# Patient Record
Sex: FEMALE | Race: WHITE | NOT HISPANIC OR LATINO | Employment: FULL TIME | URBAN - METROPOLITAN AREA
[De-identification: names, ages, dates, MRNs, and addresses within clinical notes are randomized per-mention and may not be internally consistent; named-entity substitution may affect disease eponyms.]

---

## 2017-06-21 ENCOUNTER — TELEPHONE (OUTPATIENT)
Dept: RHEUMATOLOGY | Facility: CLINIC | Age: 52
End: 2017-06-21

## 2017-06-21 NOTE — TELEPHONE ENCOUNTER
Called and informed pt that December is soonest appointment available. Informed that was placed on waiting list and should received notification via [facility] if someone cancels their appointment. Pt verbalized understanding

## 2017-06-21 NOTE — TELEPHONE ENCOUNTER
----- Message from Manjula Peres sent at 6/21/2017  2:10 PM CDT -----  Contact: Patient  Patient called and scheduled for Dec but she stated she really needs to get in sooner; she is having a lot of joint pain and swelling in her hands. Please call her at [phone].    Thanks,  Manjula

## 2017-08-02 ENCOUNTER — OFFICE VISIT (OUTPATIENT)
Dept: RHEUMATOLOGY | Facility: CLINIC | Age: 52
End: 2017-08-02
Payer: COMMERCIAL

## 2017-08-02 VITALS — WEIGHT: 171.06 LBS | BODY MASS INDEX: 29.37 KG/M2

## 2017-08-02 DIAGNOSIS — D83.9 CVID (COMMON VARIABLE IMMUNODEFICIENCY): ICD-10-CM

## 2017-08-02 DIAGNOSIS — M47.25 OSTEOARTHRITIS OF SPINE WITH RADICULOPATHY, THORACOLUMBAR REGION: ICD-10-CM

## 2017-08-02 DIAGNOSIS — D83.9 IMMUNODEFICIENCY, COMMON VARIABLE: ICD-10-CM

## 2017-08-02 DIAGNOSIS — M02.30 REACTIVE ARTHRITIS: ICD-10-CM

## 2017-08-02 DIAGNOSIS — M35.00 SJOGREN'S SYNDROME, WITH UNSPECIFIED ORGAN INVOLVEMENT: ICD-10-CM

## 2017-08-02 DIAGNOSIS — G47.26 SHIFT WORK SLEEP DISORDER: ICD-10-CM

## 2017-08-02 DIAGNOSIS — R63.5 WEIGHT GAIN: ICD-10-CM

## 2017-08-02 DIAGNOSIS — K21.9 GASTROESOPHAGEAL REFLUX DISEASE, ESOPHAGITIS PRESENCE NOT SPECIFIED: Primary | ICD-10-CM

## 2017-08-02 PROCEDURE — 96372 THER/PROPH/DIAG INJ SC/IM: CPT | Mod: S$GLB,,, | Performed by: INTERNAL MEDICINE

## 2017-08-02 PROCEDURE — 99214 OFFICE O/P EST MOD 30 MIN: CPT | Mod: 25,S$GLB,, | Performed by: INTERNAL MEDICINE

## 2017-08-02 PROCEDURE — 99999 PR PBB SHADOW E&M-EST. PATIENT-LVL II: CPT | Mod: PBBFAC,,, | Performed by: INTERNAL MEDICINE

## 2017-08-02 PROCEDURE — 3008F BODY MASS INDEX DOCD: CPT | Mod: S$GLB,,, | Performed by: INTERNAL MEDICINE

## 2017-08-02 RX ORDER — MECLIZINE HYDROCHLORIDE 25 MG/1
25 TABLET ORAL 3 TIMES DAILY PRN
Qty: 45 TABLET | Refills: 2 | Status: SHIPPED | OUTPATIENT
Start: 2017-08-02 | End: 2021-02-04

## 2017-08-02 RX ORDER — ESTRADIOL 2 MG/1
4 TABLET ORAL DAILY
Qty: 60 TABLET | Refills: 3 | Status: SHIPPED | OUTPATIENT
Start: 2017-08-02 | End: 2018-09-05 | Stop reason: SDUPTHER

## 2017-08-02 RX ORDER — ONDANSETRON 4 MG/1
4 TABLET, ORALLY DISINTEGRATING ORAL EVERY 6 HOURS PRN
Qty: 20 TABLET | Refills: 3 | Status: SHIPPED | OUTPATIENT
Start: 2017-08-02

## 2017-08-02 RX ORDER — PROMETHAZINE HYDROCHLORIDE 25 MG/1
25 TABLET ORAL EVERY 4 HOURS
Qty: 45 TABLET | Refills: 2 | Status: SHIPPED | OUTPATIENT
Start: 2017-08-02

## 2017-08-02 RX ORDER — SUCRALFATE 1 G/1
1 TABLET ORAL 4 TIMES DAILY
Qty: 120 TABLET | Refills: 5 | Status: SHIPPED | OUTPATIENT
Start: 2017-08-02 | End: 2017-12-15 | Stop reason: SDUPTHER

## 2017-08-02 RX ORDER — HYDROXYCHLOROQUINE SULFATE 200 MG/1
200 TABLET, FILM COATED ORAL 2 TIMES DAILY
Qty: 60 TABLET | Refills: 11 | Status: SHIPPED | OUTPATIENT
Start: 2017-08-02 | End: 2018-09-05 | Stop reason: SDUPTHER

## 2017-08-02 RX ORDER — KETOROLAC TROMETHAMINE 30 MG/ML
60 INJECTION, SOLUTION INTRAMUSCULAR; INTRAVENOUS
Status: COMPLETED | OUTPATIENT
Start: 2017-08-02 | End: 2017-08-02

## 2017-08-02 RX ORDER — ALMOTRIPTAN 12.5 MG/1
TABLET, FILM COATED ORAL
Qty: 12 TABLET | Refills: 3 | Status: SHIPPED | OUTPATIENT
Start: 2017-08-02 | End: 2017-12-15 | Stop reason: SDUPTHER

## 2017-08-02 RX ORDER — MODAFINIL 200 MG/1
TABLET ORAL
Qty: 30 TABLET | Refills: 4 | Status: SHIPPED | OUTPATIENT
Start: 2017-08-02 | End: 2017-12-15 | Stop reason: SDUPTHER

## 2017-08-02 RX ORDER — METHYLPREDNISOLONE 4 MG/1
8 TABLET ORAL DAILY
Qty: 60 TABLET | Refills: 3 | Status: SHIPPED | OUTPATIENT
Start: 2017-08-02 | End: 2017-09-01

## 2017-08-02 RX ORDER — LEVOCETIRIZINE DIHYDROCHLORIDE 5 MG/1
5 TABLET, FILM COATED ORAL NIGHTLY
Qty: 30 TABLET | Refills: 5 | Status: SHIPPED | OUTPATIENT
Start: 2017-08-02

## 2017-08-02 RX ORDER — SUMATRIPTAN SUCCINATE 100 MG/1
100 TABLET ORAL ONCE
Qty: 9 TABLET | Refills: 3 | Status: SHIPPED | OUTPATIENT
Start: 2017-08-02 | End: 2017-12-15

## 2017-08-02 RX ORDER — METHYLPREDNISOLONE ACETATE 80 MG/ML
160 INJECTION, SUSPENSION INTRA-ARTICULAR; INTRALESIONAL; INTRAMUSCULAR; SOFT TISSUE
Status: COMPLETED | OUTPATIENT
Start: 2017-08-02 | End: 2017-08-02

## 2017-08-02 RX ORDER — PANTOPRAZOLE SODIUM 40 MG/1
40 TABLET, DELAYED RELEASE ORAL DAILY
Qty: 30 TABLET | Refills: 11 | Status: SHIPPED | OUTPATIENT
Start: 2017-08-02 | End: 2017-12-15 | Stop reason: SDUPTHER

## 2017-08-02 RX ORDER — DICLOFENAC SODIUM 10 MG/G
2 GEL TOPICAL 3 TIMES DAILY
Qty: 100 G | Refills: 5 | Status: SHIPPED | OUTPATIENT
Start: 2017-08-02 | End: 2017-12-15 | Stop reason: SDUPTHER

## 2017-08-02 RX ADMIN — KETOROLAC TROMETHAMINE 60 MG: 30 INJECTION, SOLUTION INTRAMUSCULAR; INTRAVENOUS at 03:08

## 2017-08-02 RX ADMIN — METHYLPREDNISOLONE ACETATE 160 MG: 80 INJECTION, SUSPENSION INTRA-ARTICULAR; INTRALESIONAL; INTRAMUSCULAR; SOFT TISSUE at 03:08

## 2017-08-02 ASSESSMENT — ROUTINE ASSESSMENT OF PATIENT INDEX DATA (RAPID3)
PAIN SCORE: 7
AM STIFFNESS SCORE: 1, YES
PATIENT GLOBAL ASSESSMENT SCORE: 3
WHEN YOU AWAKENED IN THE MORNING OVER THE LAST WEEK, PLEASE INDICATE THE AMOUNT OF TIME IT TAKES UNTIL YOU ARE AS LIMBER AS YOU WILL BE FOR THE DAY: ONE HOUR AFTER WAKING
FATIGUE SCORE: 3
TOTAL RAPID3 SCORE: 5
MDHAQ FUNCTION SCORE: 1.5
PSYCHOLOGICAL DISTRESS SCORE: 3.3

## 2017-08-02 NOTE — PROGRESS NOTES
Administered 2 cc ( 80 mg/ml ) of depomedrol to the right upper outer gluteal. Informed of s/s to report verbalized understanding. No adverse reactions noted.    Lot # [id number]  Expiration 06/2018    Administered 2 cc ( 30 mg/ml ) of toradol to the left upper outer gluteal. Informed of s/s to report verbalized understanding. No adverse reactions noted.    Lot # [id number]  Expiration 1 may 2019

## 2017-08-02 NOTE — PROGRESS NOTES
Subjective:       Patient ID: Crystal Hein is a 51 y.o. female.    Chief Complaint: Follow-up    F/u: she has  ra and sjogren's  And off meds. It sinus infection multiple, dizziness vertigo,Patient complains of arthralgias and myalgias for which has been present for a few years. Pain is located in multiple joints, both shoulder(s), both elbow(s), both wrist(s), both MCP(s): 1st, 2nd, 3rd, 4th and 5th, both PIP(s): 1st, 2nd, 3rd, 4th and 5th, both DIP(s): 1st and 2nd, both hip(s), both knee(s) and both MTP(s): 1st, 2nd, 3rd, 4th and 5th, is described as aching, pulsating, shooting and throbbing, and is constant, moderate .  Associated symptoms include: crepitation, decreased range of motion, edema, effusion, tenderness and warmth.        Review of Systems   Constitutional: Positive for chills. Negative for activity change, appetite change, diaphoresis and unexpected weight change.   HENT: Negative for congestion, dental problem, ear discharge, ear pain, facial swelling, mouth sores, nosebleeds, postnasal drip, rhinorrhea, sinus pressure, sneezing, sore throat, tinnitus and voice change.    Eyes: Negative for photophobia, pain, discharge, redness and itching.   Respiratory: Negative for apnea, cough, chest tightness, shortness of breath and wheezing.    Cardiovascular: Positive for leg swelling. Negative for chest pain and palpitations.   Gastrointestinal: Positive for abdominal pain. Negative for abdominal distention, constipation, diarrhea, nausea and vomiting.   Endocrine: Negative for cold intolerance, heat intolerance, polydipsia and polyuria.   Genitourinary: Negative for decreased urine volume, difficulty urinating, flank pain, frequency, hematuria and urgency.   Musculoskeletal: Positive for arthralgias, back pain, gait problem, neck pain and neck stiffness.   Skin: Negative for pallor, rash and wound.   Allergic/Immunologic: Negative for immunocompromised state.   Neurological: Positive for weakness.  Negative for dizziness, tremors and numbness.   Hematological: Negative for adenopathy. Does not bruise/bleed easily.   Psychiatric/Behavioral: Negative for sleep disturbance. The patient is not nervous/anxious.          Objective:     Wt 77.6 kg (171 lb 1.2 oz)   BMI 29.37 kg/m²      Physical Exam   Vitals reviewed.  Constitutional: She is oriented to person, place, and time. She appears distressed.   HENT:   Head: Normocephalic and atraumatic.   Eyes: EOM are normal. Pupils are equal, round, and reactive to light. Right eye exhibits no discharge. Left eye exhibits no discharge.   Neck: Neck supple. No thyromegaly present.   Cardiovascular: Normal rate, regular rhythm and normal heart sounds.  Exam reveals no gallop and no friction rub.    No murmur heard.  Pulmonary/Chest: Breath sounds normal. She has no wheezes. She has no rales. She exhibits no tenderness.   Abdominal: There is no tenderness. There is no rebound and no guarding.       Right Side Rheumatological Exam     Examination finds the elbow normal.    The patient is tender to palpation of the shoulder, wrist, knee, 1st PIP, 1st MCP, 2nd PIP, 2nd MCP, 3rd PIP, 3rd MCP, 4th PIP, 4th MCP, 5th PIP and 5th MCP    She has swelling of the 1st PIP, 1st MCP, 2nd PIP, 2nd MCP, 3rd PIP, 3rd MCP, 4th PIP, 4th MCP, 5th PIP and 5th MCP    Shoulder Exam   Tenderness Location: no tenderness    Range of Motion   Active Abduction: abnormal   Adduction: abnormal  Sensation: normal    Knee Exam   Patellofemoral Crepitus: positive  Effusion: positive  Sensation: normal    Hip Exam   Tenderness Location: posterior  Sensation: normal    Elbow/Wrist Exam   Tenderness Location: no tenderness  Sensation: normal    Left Side Rheumatological Exam     The patient is tender to palpation of the shoulder, elbow, wrist, knee, 1st PIP, 1st MCP, 2nd PIP, 2nd MCP, 3rd PIP, 3rd MCP, 4th PIP, 4th MCP, 5th PIP and 5th MCP.    She has swelling of the 1st PIP, 1st MCP, 2nd PIP, 2nd MCP, 3rd  PIP, 3rd MCP, 4th PIP, 4th MCP, 5th PIP and 5th MCP    Shoulder Exam   Tenderness Location: no tenderness    Range of Motion   Active Abduction: abnormal   Sensation: normal    Knee Exam     Patellofemoral Crepitus: positive  Effusion: positive  Sensation: normal    Hip Exam   Tenderness Location: posterior  Sensation: normal    Elbow/Wrist Exam   Sensation: normal      Back/Neck Exam   General Inspection   Gait: normal         Lymphadenopathy:     She has no cervical adenopathy.   Neurological: She is alert and oriented to person, place, and time. Gait normal.   Skin: Skin is dry. No rash noted. No erythema. There is pallor.     Psychiatric: Mood and affect normal.   Musculoskeletal: She exhibits tenderness and deformity. She exhibits no edema.                 Assessment:          Encounter Diagnoses   Name Primary?    Osteoarthritis of spine with radiculopathy, thoracolumbar region     Reactive arthritis     Sjogren's syndrome, with unspecified organ involvement     Chest pain radiating to arm     Immunodeficiency, common variable     Cervical (neck) region somatic dysfunction     Shift work sleep disorder     CVID (common variable immunodeficiency)     Vertigo     Weight gain     Other sinusitis, unspecified chronicity          Plan:     [place] was seen today for follow-up.    Diagnoses and all orders for this visit:    Gastroesophageal reflux disease, esophagitis presence not specified  -     CBC auto differential; Future  -     Comprehensive metabolic panel; Future  -     C-reactive protein; Future  -     Sjogrens syndrome-A extractable nuclear antibody; Future  -     Sjogrens syndrome-B extractable nuclear antibody; Future  -     Sedimentation rate, manual; Future  -     Anti-Histone Antibody; Future  -     Anti-scleroderma antibody; Future  -     TSH; Future  -     T4, free; Future  -     T3, free; Future  -     sucralfate (CARAFATE) 1 gram tablet; Take 1 tablet (1 g total) by mouth 4 (four) times  daily.  -     pantoprazole (PROTONIX) 40 MG tablet; Take 1 tablet (40 mg total) by mouth once daily.  -     CBC auto differential  -     Comprehensive metabolic panel  -     C-reactive protein  -     Sjogrens syndrome-A extractable nuclear antibody  -     Sjogrens syndrome-B extractable nuclear antibody  -     Sedimentation rate, manual  -     Anti-Histone Antibody  -     Anti-scleroderma antibody  -     TSH  -     T3, free    Osteoarthritis of spine with radiculopathy, thoracolumbar region  -     diclofenac sodium (VOLTAREN) 1 % Gel; Apply 2 g topically 3 (three) times daily.  -     estradiol (ESTRACE) 2 MG tablet; Take 2 tablets (4 mg total) by mouth once daily.  -     modafinil (PROVIGIL) 200 MG Tab; 1 tab po qam  -     ketorolac injection 60 mg; Inject 2 mLs (60 mg total) into the muscle one time.  -     methylPREDNISolone acetate injection 160 mg; Inject 2 mLs (160 mg total) into the muscle one time.    Reactive arthritis  -     almotriptan (AXERT) 12.5 MG tablet; TAKE ONE TABLET ONSET OF MIGRAINE AND REPEAT IN TWO HOURS IF NEEDED MAX TWO TABS/DAY  -     diclofenac sodium (VOLTAREN) 1 % Gel; Apply 2 g topically 3 (three) times daily.  -     estradiol (ESTRACE) 2 MG tablet; Take 2 tablets (4 mg total) by mouth once daily.  -     hydroxychloroquine (PLAQUENIL) 200 mg tablet; Take 1 tablet (200 mg total) by mouth 2 (two) times daily.  -     levocetirizine (XYZAL) 5 MG tablet; Take 1 tablet (5 mg total) by mouth every evening.  -     meclizine (ANTIVERT) 25 mg tablet; Take 1 tablet (25 mg total) by mouth 3 (three) times daily as needed.  -     modafinil (PROVIGIL) 200 MG Tab; 1 tab po qam  -     ondansetron (ZOFRAN-ODT) 4 MG TbDL; Take 1 tablet (4 mg total) by mouth every 6 (six) hours as needed.  -     promethazine (PHENERGAN) 25 MG tablet; Take 1 tablet (25 mg total) by mouth every 4 (four) hours.  -     sumatriptan (IMITREX) 100 MG tablet; Take 1 tablet (100 mg total) by mouth once.  -     methylPREDNISolone  (MEDROL) 4 MG Tab; Take 2 tablets (8 mg total) by mouth once daily.  -     ketorolac injection 60 mg; Inject 2 mLs (60 mg total) into the muscle one time.  -     methylPREDNISolone acetate injection 160 mg; Inject 2 mLs (160 mg total) into the muscle one time.    Sjogren's syndrome, with unspecified organ involvement  -     almotriptan (AXERT) 12.5 MG tablet; TAKE ONE TABLET ONSET OF MIGRAINE AND REPEAT IN TWO HOURS IF NEEDED MAX TWO TABS/DAY  -     diclofenac sodium (VOLTAREN) 1 % Gel; Apply 2 g topically 3 (three) times daily.  -     estradiol (ESTRACE) 2 MG tablet; Take 2 tablets (4 mg total) by mouth once daily.  -     hydroxychloroquine (PLAQUENIL) 200 mg tablet; Take 1 tablet (200 mg total) by mouth 2 (two) times daily.  -     levocetirizine (XYZAL) 5 MG tablet; Take 1 tablet (5 mg total) by mouth every evening.  -     meclizine (ANTIVERT) 25 mg tablet; Take 1 tablet (25 mg total) by mouth 3 (three) times daily as needed.  -     modafinil (PROVIGIL) 200 MG Tab; 1 tab po qam  -     ondansetron (ZOFRAN-ODT) 4 MG TbDL; Take 1 tablet (4 mg total) by mouth every 6 (six) hours as needed.  -     promethazine (PHENERGAN) 25 MG tablet; Take 1 tablet (25 mg total) by mouth every 4 (four) hours.  -     sumatriptan (IMITREX) 100 MG tablet; Take 1 tablet (100 mg total) by mouth once.  -     methylPREDNISolone (MEDROL) 4 MG Tab; Take 2 tablets (8 mg total) by mouth once daily.  -     ketorolac injection 60 mg; Inject 2 mLs (60 mg total) into the muscle one time.  -     methylPREDNISolone acetate injection 160 mg; Inject 2 mLs (160 mg total) into the muscle one time.    Immunodeficiency, common variable  -     diclofenac sodium (VOLTAREN) 1 % Gel; Apply 2 g topically 3 (three) times daily.    Shift work sleep disorder  -     estradiol (ESTRACE) 2 MG tablet; Take 2 tablets (4 mg total) by mouth once daily.  -     modafinil (PROVIGIL) 200 MG Tab; 1 tab po qam    CVID (common variable immunodeficiency)  -     almotriptan  (AXERT) 12.5 MG tablet; TAKE ONE TABLET ONSET OF MIGRAINE AND REPEAT IN TWO HOURS IF NEEDED MAX TWO TABS/DAY  -     diclofenac sodium (VOLTAREN) 1 % Gel; Apply 2 g topically 3 (three) times daily.  -     estradiol (ESTRACE) 2 MG tablet; Take 2 tablets (4 mg total) by mouth once daily.  -     hydroxychloroquine (PLAQUENIL) 200 mg tablet; Take 1 tablet (200 mg total) by mouth 2 (two) times daily.  -     levocetirizine (XYZAL) 5 MG tablet; Take 1 tablet (5 mg total) by mouth every evening.  -     meclizine (ANTIVERT) 25 mg tablet; Take 1 tablet (25 mg total) by mouth 3 (three) times daily as needed.  -     modafinil (PROVIGIL) 200 MG Tab; 1 tab po qam  -     ondansetron (ZOFRAN-ODT) 4 MG TbDL; Take 1 tablet (4 mg total) by mouth every 6 (six) hours as needed.  -     promethazine (PHENERGAN) 25 MG tablet; Take 1 tablet (25 mg total) by mouth every 4 (four) hours.  -     sumatriptan (IMITREX) 100 MG tablet; Take 1 tablet (100 mg total) by mouth once.  -     methylPREDNISolone (MEDROL) 4 MG Tab; Take 2 tablets (8 mg total) by mouth once daily.  -     ketorolac injection 60 mg; Inject 2 mLs (60 mg total) into the muscle one time.  -     methylPREDNISolone acetate injection 160 mg; Inject 2 mLs (160 mg total) into the muscle one time.    Weight gain  -     promethazine (PHENERGAN) 25 MG tablet; Take 1 tablet (25 mg total) by mouth every 4 (four) hours.  -     sumatriptan (IMITREX) 100 MG tablet; Take 1 tablet (100 mg total) by mouth once.

## 2017-12-14 ENCOUNTER — PATIENT MESSAGE (OUTPATIENT)
Dept: RHEUMATOLOGY | Facility: CLINIC | Age: 52
End: 2017-12-14

## 2017-12-15 ENCOUNTER — OFFICE VISIT (OUTPATIENT)
Dept: RHEUMATOLOGY | Facility: CLINIC | Age: 52
End: 2017-12-15
Payer: COMMERCIAL

## 2017-12-15 VITALS
BODY MASS INDEX: 29.33 KG/M2 | WEIGHT: 170.88 LBS | DIASTOLIC BLOOD PRESSURE: 73 MMHG | SYSTOLIC BLOOD PRESSURE: 131 MMHG | HEART RATE: 80 BPM

## 2017-12-15 DIAGNOSIS — M35.00 SJOGREN'S SYNDROME, WITH UNSPECIFIED ORGAN INVOLVEMENT: ICD-10-CM

## 2017-12-15 DIAGNOSIS — M99.01 CERVICAL (NECK) REGION SOMATIC DYSFUNCTION: ICD-10-CM

## 2017-12-15 DIAGNOSIS — M02.30 REACTIVE ARTHRITIS: ICD-10-CM

## 2017-12-15 DIAGNOSIS — K21.9 GASTROESOPHAGEAL REFLUX DISEASE, ESOPHAGITIS PRESENCE NOT SPECIFIED: ICD-10-CM

## 2017-12-15 DIAGNOSIS — D83.9 IMMUNODEFICIENCY, COMMON VARIABLE: ICD-10-CM

## 2017-12-15 DIAGNOSIS — M47.25 OSTEOARTHRITIS OF SPINE WITH RADICULOPATHY, THORACOLUMBAR REGION: ICD-10-CM

## 2017-12-15 DIAGNOSIS — G47.26 SHIFT WORK SLEEP DISORDER: ICD-10-CM

## 2017-12-15 DIAGNOSIS — D83.9 CVID (COMMON VARIABLE IMMUNODEFICIENCY): ICD-10-CM

## 2017-12-15 PROCEDURE — 96372 THER/PROPH/DIAG INJ SC/IM: CPT | Mod: S$GLB,,, | Performed by: INTERNAL MEDICINE

## 2017-12-15 PROCEDURE — 99214 OFFICE O/P EST MOD 30 MIN: CPT | Mod: 25,S$GLB,, | Performed by: INTERNAL MEDICINE

## 2017-12-15 PROCEDURE — 99999 PR PBB SHADOW E&M-EST. PATIENT-LVL III: CPT | Mod: PBBFAC,,, | Performed by: INTERNAL MEDICINE

## 2017-12-15 RX ORDER — CYANOCOBALAMIN 1000 UG/ML
1000 INJECTION, SOLUTION INTRAMUSCULAR; SUBCUTANEOUS
Status: COMPLETED | OUTPATIENT
Start: 2017-12-15 | End: 2017-12-15

## 2017-12-15 RX ORDER — METHYLPREDNISOLONE ACETATE 80 MG/ML
160 INJECTION, SUSPENSION INTRA-ARTICULAR; INTRALESIONAL; INTRAMUSCULAR; SOFT TISSUE
Status: COMPLETED | OUTPATIENT
Start: 2017-12-15 | End: 2017-12-15

## 2017-12-15 RX ORDER — KETOROLAC TROMETHAMINE 30 MG/ML
60 INJECTION, SOLUTION INTRAMUSCULAR; INTRAVENOUS
Status: COMPLETED | OUTPATIENT
Start: 2017-12-15 | End: 2017-12-15

## 2017-12-15 RX ORDER — MODAFINIL 200 MG/1
TABLET ORAL
Qty: 30 TABLET | Refills: 4 | Status: SHIPPED | OUTPATIENT
Start: 2017-12-15 | End: 2018-11-13

## 2017-12-15 RX ORDER — PANTOPRAZOLE SODIUM 40 MG/1
40 TABLET, DELAYED RELEASE ORAL DAILY
Qty: 30 TABLET | Refills: 11 | Status: SHIPPED | OUTPATIENT
Start: 2017-12-15 | End: 2018-11-13 | Stop reason: SDUPTHER

## 2017-12-15 RX ORDER — IBUPROFEN AND FAMOTIDINE 26.6; 8 MG/1; MG/1
1 TABLET ORAL 3 TIMES DAILY
Qty: 90 TABLET | Refills: 6 | Status: SHIPPED | OUTPATIENT
Start: 2017-12-15 | End: 2018-11-13 | Stop reason: SDUPTHER

## 2017-12-15 RX ORDER — DICLOFENAC SODIUM 10 MG/G
2 GEL TOPICAL 3 TIMES DAILY
Qty: 100 G | Refills: 5 | Status: SHIPPED | OUTPATIENT
Start: 2017-12-15 | End: 2019-09-11

## 2017-12-15 RX ORDER — ALMOTRIPTAN 12.5 MG/1
TABLET, FILM COATED ORAL
Qty: 12 TABLET | Refills: 3 | Status: SHIPPED | OUTPATIENT
Start: 2017-12-15 | End: 2018-11-13

## 2017-12-15 RX ORDER — HYDROCODONE BITARTRATE AND ACETAMINOPHEN 10; 325 MG/1; MG/1
1 TABLET ORAL 3 TIMES DAILY PRN
Qty: 90 TABLET | Refills: 0 | Status: SHIPPED | OUTPATIENT
Start: 2017-12-15 | End: 2018-06-11

## 2017-12-15 RX ADMIN — METHYLPREDNISOLONE ACETATE 160 MG: 80 INJECTION, SUSPENSION INTRA-ARTICULAR; INTRALESIONAL; INTRAMUSCULAR; SOFT TISSUE at 12:12

## 2017-12-15 RX ADMIN — CYANOCOBALAMIN 1000 MCG: 1000 INJECTION, SOLUTION INTRAMUSCULAR; SUBCUTANEOUS at 12:12

## 2017-12-15 RX ADMIN — KETOROLAC TROMETHAMINE 60 MG: 30 INJECTION, SOLUTION INTRAMUSCULAR; INTRAVENOUS at 12:12

## 2017-12-15 ASSESSMENT — ROUTINE ASSESSMENT OF PATIENT INDEX DATA (RAPID3)
PSYCHOLOGICAL DISTRESS SCORE: 3.3
PATIENT GLOBAL ASSESSMENT SCORE: 3
MDHAQ FUNCTION SCORE: 1.5
TOTAL RAPID3 SCORE: 5.33
WHEN YOU AWAKENED IN THE MORNING OVER THE LAST WEEK, PLEASE INDICATE THE AMOUNT OF TIME IT TAKES UNTIL YOU ARE AS LIMBER AS YOU WILL BE FOR THE DAY: ONE HOUR AFTER WAKING
PAIN SCORE: 8
FATIGUE SCORE: 4
AM STIFFNESS SCORE: 1, YES

## 2017-12-15 NOTE — PROGRESS NOTES
Subjective:       Patient ID: Crystal Hein is a 52 y.o. female.    Chief Complaint: No chief complaint on file.    F/u: she has  ra and sjogren's  And off meds. It sinus infection multiple, dizziness vertigo,Patient complains of arthralgias and myalgias for which has been present for a few years. Pain is located in multiple joints, both shoulder(s), both elbow(s), both wrist(s), both MCP(s): 1st, 2nd, 3rd, 4th and 5th, both PIP(s): 1st, 2nd, 3rd, 4th and 5th, both DIP(s): 1st and 2nd, both hip(s), both knee(s) and both MTP(s): 1st, 2nd, 3rd, 4th and 5th, is described as aching, pulsating, shooting and throbbing, and is constant, moderate .  Associated symptoms include: crepitation, decreased range of motion, edema, effusion, tenderness and warmth.        Review of Systems   Constitutional: Positive for chills. Negative for activity change, appetite change, diaphoresis and unexpected weight change.   HENT: Negative for congestion, dental problem, ear discharge, ear pain, facial swelling, mouth sores, nosebleeds, postnasal drip, rhinorrhea, sinus pressure, sneezing, sore throat, tinnitus and voice change.    Eyes: Negative for photophobia, pain, discharge, redness and itching.   Respiratory: Negative for apnea, cough, chest tightness, shortness of breath and wheezing.    Cardiovascular: Positive for leg swelling. Negative for chest pain and palpitations.   Gastrointestinal: Positive for abdominal pain. Negative for abdominal distention, constipation, diarrhea, nausea and vomiting.   Endocrine: Negative for cold intolerance, heat intolerance, polydipsia and polyuria.   Genitourinary: Negative for decreased urine volume, difficulty urinating, flank pain, frequency, hematuria and urgency.   Musculoskeletal: Positive for arthralgias, back pain, gait problem, neck pain and neck stiffness.   Skin: Negative for pallor, rash and wound.   Allergic/Immunologic: Negative for immunocompromised state.   Neurological:  Positive for weakness. Negative for dizziness, tremors and numbness.   Hematological: Negative for adenopathy. Does not bruise/bleed easily.   Psychiatric/Behavioral: Negative for sleep disturbance. The patient is not nervous/anxious.          Objective:     There were no vitals taken for this visit.     Physical Exam   Vitals reviewed.  Constitutional: She is oriented to person, place, and time. She appears distressed.   HENT:   Head: Normocephalic and atraumatic.   Eyes: EOM are normal. Pupils are equal, round, and reactive to light. Right eye exhibits no discharge. Left eye exhibits no discharge.   Neck: Neck supple. No thyromegaly present.   Cardiovascular: Normal rate, regular rhythm and normal heart sounds.  Exam reveals no gallop and no friction rub.    No murmur heard.  Pulmonary/Chest: Breath sounds normal. She has no wheezes. She has no rales. She exhibits no tenderness.   Abdominal: There is no tenderness. There is no rebound and no guarding.       Right Side Rheumatological Exam     Examination finds the elbow normal.    The patient is tender to palpation of the shoulder, wrist, knee, 1st PIP, 1st MCP, 2nd PIP, 2nd MCP, 3rd PIP, 3rd MCP, 4th PIP, 4th MCP, 5th PIP and 5th MCP    She has swelling of the 1st PIP, 1st MCP, 2nd PIP, 2nd MCP, 3rd PIP, 3rd MCP, 4th PIP, 4th MCP, 5th PIP and 5th MCP    Shoulder Exam   Tenderness Location: no tenderness    Range of Motion   Active Abduction: abnormal   Adduction: abnormal  Sensation: normal    Knee Exam   Patellofemoral Crepitus: positive  Effusion: positive  Sensation: normal    Hip Exam   Tenderness Location: posterior  Sensation: normal    Elbow/Wrist Exam   Tenderness Location: no tenderness  Sensation: normal    Left Side Rheumatological Exam     The patient is tender to palpation of the shoulder, elbow, wrist, knee, 1st PIP, 1st MCP, 2nd PIP, 2nd MCP, 3rd PIP, 3rd MCP, 4th PIP, 4th MCP, 5th PIP and 5th MCP.    She has swelling of the 1st PIP, 1st MCP, 2nd  PIP, 2nd MCP, 3rd PIP, 3rd MCP, 4th PIP, 4th MCP, 5th PIP and 5th MCP    Shoulder Exam   Tenderness Location: no tenderness    Range of Motion   Active Abduction: abnormal   Sensation: normal    Knee Exam     Patellofemoral Crepitus: positive  Effusion: positive  Sensation: normal    Hip Exam   Tenderness Location: posterior  Sensation: normal    Elbow/Wrist Exam   Sensation: normal      Back/Neck Exam   General Inspection   Gait: normal         Lymphadenopathy:     She has no cervical adenopathy.   Neurological: She is alert and oriented to person, place, and time. Gait normal.   Skin: Skin is dry. No rash noted. No erythema. There is pallor.     Psychiatric: Mood and affect normal.   Musculoskeletal: She exhibits tenderness and deformity. She exhibits no edema.             Results for orders placed or performed in visit on 07/13/15   2D echo with color flow doppler   Result Value Ref Range    Right Vent (Diastole) 2.0 0.8 - 2.6    Septum (Diastole) 0.8 0.6 - 1.2    Left Ventricle (Diastole) 4.5 3.5 - 5.5    Posterior Wall (Diastole) 0.8 0.6 - 1.2    Aortic Valve (Diastole) 1.4 (A) 1.5 - 2.6    Aortic Root (Diastole) 3.0 1.3 - 3.7    Left Atrium (Diastole) 3.4 2.5 - 4    Septum (Systole)  0.5 - 1.1    Left Ventricle (Systole) 3.0 2.2 - 4    Posteriol Wall (Systole)  0.5 - 1.2    EF 62 %    Aortic Valve Area  2.5 - 3.5 cm2    Aortic Peak Gradient 5 0 - 9.9 mm Hg    Aortic Mean Gradient 2 0 - 9.9 mm Hg    Aortic Peak Velocity 1.1 0 - 1.9 m/s    Aortic Pres. Half Time  599 - 999 m/sec    Mitral Valve Area 5 4 - 6 cm2    Mitral Pres. Half Time  30 - 60 m/sec    Mitral Valve E-A Ratio 1.2 0.75 - 999    Mitral Valve E-E prime 6 0 - 7    Pulmonary Artery Pressure 18 0 - 29 mm Hg     No recent labs usually done at [facility] and is in media    Assessment:          Encounter Diagnoses   Name Primary?    Reactive arthritis     Sjogren's syndrome, with unspecified organ involvement     CVID (common variable immunodeficiency)      Osteoarthritis of spine with radiculopathy, thoracolumbar region     Immunodeficiency, common variable     Shift work sleep disorder     Gastroesophageal reflux disease, esophagitis presence not specified     Cervical (neck) region somatic dysfunction          Plan:     [place] was seen today for follow-up.    Diagnoses and all orders for this visit:    Reactive arthritis  -     almotriptan (AXERT) 12.5 MG tablet; TAKE ONE TABLET ONSET OF MIGRAINE AND REPEAT IN TWO HOURS IF NEEDED MAX TWO TABS/DAY  -     diclofenac sodium (VOLTAREN) 1 % Gel; Apply 2 g topically 3 (three) times daily.  -     modafinil (PROVIGIL) 200 MG Tab; 1 tab po qam  -     pantoprazole (PROTONIX) 40 MG tablet; Take 1 tablet (40 mg total) by mouth once daily.  -     hydrocodone-acetaminophen 10-325mg (NORCO)  mg Tab; Take 1 tablet by mouth 3 (three) times daily as needed.  -     ketorolac injection 60 mg; Inject 2 mLs (60 mg total) into the muscle one time.  -     methylPREDNISolone acetate injection 160 mg; Inject 2 mLs (160 mg total) into the muscle one time.  -     SALEEM; Future  -     Anti-DNA antibody, double-stranded; Future  -     Anti-Histone Antibody; Future  -     Anti Sm/RNP Antibody; Future  -     Anti-scleroderma antibody; Future  -     Anti-Smith antibody; Future  -     Sjogrens syndrome-A extractable nuclear antibody; Future  -     Sjogrens syndrome-B extractable nuclear antibody; Future  -     Sedimentation rate, manual; Future  -     Rheumatoid factor; Future  -     CBC auto differential; Future  -     Comprehensive metabolic panel; Future  -     C-reactive protein; Future  -     TSH; Future  -     T4, free; Future  -     T3, free; Future  -     IgG; Future  -     IgG 1, 2, 3, and 4; Future  -     IgM; Future  -     SALEEM  -     Anti-DNA antibody, double-stranded  -     Anti-Histone Antibody  -     Anti Sm/RNP Antibody  -     Anti-scleroderma antibody  -     Anti-Smith antibody  -     Sjogrens syndrome-A extractable  nuclear antibody  -     Sjogrens syndrome-B extractable nuclear antibody  -     Sedimentation rate, manual  -     Rheumatoid factor  -     CBC auto differential  -     Comprehensive metabolic panel  -     C-reactive protein  -     TSH  -     T4, free  -     T3, free  -     IgG  -     IgG 1, 2, 3, and 4  -     IgM    Sjogren's syndrome, with unspecified organ involvement  -     almotriptan (AXERT) 12.5 MG tablet; TAKE ONE TABLET ONSET OF MIGRAINE AND REPEAT IN TWO HOURS IF NEEDED MAX TWO TABS/DAY  -     diclofenac sodium (VOLTAREN) 1 % Gel; Apply 2 g topically 3 (three) times daily.  -     modafinil (PROVIGIL) 200 MG Tab; 1 tab po qam  -     pantoprazole (PROTONIX) 40 MG tablet; Take 1 tablet (40 mg total) by mouth once daily.  -     hydrocodone-acetaminophen 10-325mg (NORCO)  mg Tab; Take 1 tablet by mouth 3 (three) times daily as needed.  -     ketorolac injection 60 mg; Inject 2 mLs (60 mg total) into the muscle one time.  -     methylPREDNISolone acetate injection 160 mg; Inject 2 mLs (160 mg total) into the muscle one time.  -     SALEEM; Future  -     Anti-DNA antibody, double-stranded; Future  -     Anti-Histone Antibody; Future  -     Anti Sm/RNP Antibody; Future  -     Anti-scleroderma antibody; Future  -     Anti-Smith antibody; Future  -     Sjogrens syndrome-A extractable nuclear antibody; Future  -     Sjogrens syndrome-B extractable nuclear antibody; Future  -     Sedimentation rate, manual; Future  -     Rheumatoid factor; Future  -     CBC auto differential; Future  -     Comprehensive metabolic panel; Future  -     C-reactive protein; Future  -     TSH; Future  -     T4, free; Future  -     T3, free; Future  -     IgG; Future  -     IgG 1, 2, 3, and 4; Future  -     IgM; Future  -     SALEEM  -     Anti-DNA antibody, double-stranded  -     Anti-Histone Antibody  -     Anti Sm/RNP Antibody  -     Anti-scleroderma antibody  -     Anti-Smith antibody  -     Sjogrens syndrome-A extractable nuclear  antibody  -     Sjogrens syndrome-B extractable nuclear antibody  -     Sedimentation rate, manual  -     Rheumatoid factor  -     CBC auto differential  -     Comprehensive metabolic panel  -     C-reactive protein  -     TSH  -     T4, free  -     T3, free  -     IgG  -     IgG 1, 2, 3, and 4  -     IgM    CVID (common variable immunodeficiency)  -     almotriptan (AXERT) 12.5 MG tablet; TAKE ONE TABLET ONSET OF MIGRAINE AND REPEAT IN TWO HOURS IF NEEDED MAX TWO TABS/DAY  -     diclofenac sodium (VOLTAREN) 1 % Gel; Apply 2 g topically 3 (three) times daily.  -     modafinil (PROVIGIL) 200 MG Tab; 1 tab po qam  -     pantoprazole (PROTONIX) 40 MG tablet; Take 1 tablet (40 mg total) by mouth once daily.  -     hydrocodone-acetaminophen 10-325mg (NORCO)  mg Tab; Take 1 tablet by mouth 3 (three) times daily as needed.  -     ketorolac injection 60 mg; Inject 2 mLs (60 mg total) into the muscle one time.  -     methylPREDNISolone acetate injection 160 mg; Inject 2 mLs (160 mg total) into the muscle one time.  -     SALEEM; Future  -     Anti-DNA antibody, double-stranded; Future  -     Anti-Histone Antibody; Future  -     Anti Sm/RNP Antibody; Future  -     Anti-scleroderma antibody; Future  -     Anti-Smith antibody; Future  -     Sjogrens syndrome-A extractable nuclear antibody; Future  -     Sjogrens syndrome-B extractable nuclear antibody; Future  -     Sedimentation rate, manual; Future  -     Rheumatoid factor; Future  -     CBC auto differential; Future  -     Comprehensive metabolic panel; Future  -     C-reactive protein; Future  -     TSH; Future  -     T4, free; Future  -     T3, free; Future  -     IgG; Future  -     IgG 1, 2, 3, and 4; Future  -     IgM; Future  -     SALEEM  -     Anti-DNA antibody, double-stranded  -     Anti-Histone Antibody  -     Anti Sm/RNP Antibody  -     Anti-scleroderma antibody  -     Anti-Smith antibody  -     Sjogrens syndrome-A extractable nuclear antibody  -     Sjogrens  syndrome-B extractable nuclear antibody  -     Sedimentation rate, manual  -     Rheumatoid factor  -     CBC auto differential  -     Comprehensive metabolic panel  -     C-reactive protein  -     TSH  -     T4, free  -     T3, free  -     IgG  -     IgG 1, 2, 3, and 4  -     IgM    Osteoarthritis of spine with radiculopathy, thoracolumbar region  -     almotriptan (AXERT) 12.5 MG tablet; TAKE ONE TABLET ONSET OF MIGRAINE AND REPEAT IN TWO HOURS IF NEEDED MAX TWO TABS/DAY  -     diclofenac sodium (VOLTAREN) 1 % Gel; Apply 2 g topically 3 (three) times daily.  -     modafinil (PROVIGIL) 200 MG Tab; 1 tab po qam  -     pantoprazole (PROTONIX) 40 MG tablet; Take 1 tablet (40 mg total) by mouth once daily.  -     hydrocodone-acetaminophen 10-325mg (NORCO)  mg Tab; Take 1 tablet by mouth 3 (three) times daily as needed.  -     ibuprofen-famotidine (DUEXIS) 800-26.6 mg Tab; Take 1 tablet by mouth 3 (three) times daily.  -     ketorolac injection 60 mg; Inject 2 mLs (60 mg total) into the muscle one time.  -     methylPREDNISolone acetate injection 160 mg; Inject 2 mLs (160 mg total) into the muscle one time.  -     SALEEM; Future  -     Anti-DNA antibody, double-stranded; Future  -     Anti-Histone Antibody; Future  -     Anti Sm/RNP Antibody; Future  -     Anti-scleroderma antibody; Future  -     Anti-Smith antibody; Future  -     Sjogrens syndrome-A extractable nuclear antibody; Future  -     Sjogrens syndrome-B extractable nuclear antibody; Future  -     Sedimentation rate, manual; Future  -     Rheumatoid factor; Future  -     CBC auto differential; Future  -     Comprehensive metabolic panel; Future  -     C-reactive protein; Future  -     TSH; Future  -     T4, free; Future  -     T3, free; Future  -     IgG; Future  -     IgG 1, 2, 3, and 4; Future  -     IgM; Future  -     SALEEM  -     Anti-DNA antibody, double-stranded  -     Anti-Histone Antibody  -     Anti Sm/RNP Antibody  -     Anti-scleroderma antibody  -      Anti-Smith antibody  -     Sjogrens syndrome-A extractable nuclear antibody  -     Sjogrens syndrome-B extractable nuclear antibody  -     Sedimentation rate, manual  -     Rheumatoid factor  -     CBC auto differential  -     Comprehensive metabolic panel  -     C-reactive protein  -     TSH  -     T4, free  -     T3, free  -     IgG  -     IgG 1, 2, 3, and 4  -     IgM    Immunodeficiency, common variable  -     almotriptan (AXERT) 12.5 MG tablet; TAKE ONE TABLET ONSET OF MIGRAINE AND REPEAT IN TWO HOURS IF NEEDED MAX TWO TABS/DAY  -     diclofenac sodium (VOLTAREN) 1 % Gel; Apply 2 g topically 3 (three) times daily.  -     modafinil (PROVIGIL) 200 MG Tab; 1 tab po qam  -     pantoprazole (PROTONIX) 40 MG tablet; Take 1 tablet (40 mg total) by mouth once daily.  -     hydrocodone-acetaminophen 10-325mg (NORCO)  mg Tab; Take 1 tablet by mouth 3 (three) times daily as needed.  -     ketorolac injection 60 mg; Inject 2 mLs (60 mg total) into the muscle one time.  -     methylPREDNISolone acetate injection 160 mg; Inject 2 mLs (160 mg total) into the muscle one time.    Shift work sleep disorder  -     almotriptan (AXERT) 12.5 MG tablet; TAKE ONE TABLET ONSET OF MIGRAINE AND REPEAT IN TWO HOURS IF NEEDED MAX TWO TABS/DAY  -     diclofenac sodium (VOLTAREN) 1 % Gel; Apply 2 g topically 3 (three) times daily.  -     modafinil (PROVIGIL) 200 MG Tab; 1 tab po qam  -     pantoprazole (PROTONIX) 40 MG tablet; Take 1 tablet (40 mg total) by mouth once daily.  -     hydrocodone-acetaminophen 10-325mg (NORCO)  mg Tab; Take 1 tablet by mouth 3 (three) times daily as needed.  -     ketorolac injection 60 mg; Inject 2 mLs (60 mg total) into the muscle one time.  -     methylPREDNISolone acetate injection 160 mg; Inject 2 mLs (160 mg total) into the muscle one time.  -     SALEEM; Future  -     Anti-DNA antibody, double-stranded; Future  -     Anti-Histone Antibody; Future  -     Anti Sm/RNP Antibody; Future  -      Anti-scleroderma antibody; Future  -     Anti-Smith antibody; Future  -     Sjogrens syndrome-A extractable nuclear antibody; Future  -     Sjogrens syndrome-B extractable nuclear antibody; Future  -     Sedimentation rate, manual; Future  -     Rheumatoid factor; Future  -     CBC auto differential; Future  -     Comprehensive metabolic panel; Future  -     C-reactive protein; Future  -     TSH; Future  -     T4, free; Future  -     T3, free; Future  -     IgG; Future  -     IgG 1, 2, 3, and 4; Future  -     IgM; Future  -     SALEEM  -     Anti-DNA antibody, double-stranded  -     Anti-Histone Antibody  -     Anti Sm/RNP Antibody  -     Anti-scleroderma antibody  -     Anti-Smith antibody  -     Sjogrens syndrome-A extractable nuclear antibody  -     Sjogrens syndrome-B extractable nuclear antibody  -     Sedimentation rate, manual  -     Rheumatoid factor  -     CBC auto differential  -     Comprehensive metabolic panel  -     C-reactive protein  -     TSH  -     T4, free  -     T3, free  -     IgG  -     IgG 1, 2, 3, and 4  -     IgM    Gastroesophageal reflux disease, esophagitis presence not specified  -     almotriptan (AXERT) 12.5 MG tablet; TAKE ONE TABLET ONSET OF MIGRAINE AND REPEAT IN TWO HOURS IF NEEDED MAX TWO TABS/DAY  -     diclofenac sodium (VOLTAREN) 1 % Gel; Apply 2 g topically 3 (three) times daily.  -     modafinil (PROVIGIL) 200 MG Tab; 1 tab po qam  -     pantoprazole (PROTONIX) 40 MG tablet; Take 1 tablet (40 mg total) by mouth once daily.  -     hydrocodone-acetaminophen 10-325mg (NORCO)  mg Tab; Take 1 tablet by mouth 3 (three) times daily as needed.  -     ketorolac injection 60 mg; Inject 2 mLs (60 mg total) into the muscle one time.  -     methylPREDNISolone acetate injection 160 mg; Inject 2 mLs (160 mg total) into the muscle one time.  -     SALEEM; Future  -     Anti-DNA antibody, double-stranded; Future  -     Anti-Histone Antibody; Future  -     Anti Sm/RNP Antibody; Future  -      Anti-scleroderma antibody; Future  -     Anti-Smith antibody; Future  -     Sjogrens syndrome-A extractable nuclear antibody; Future  -     Sjogrens syndrome-B extractable nuclear antibody; Future  -     Sedimentation rate, manual; Future  -     Rheumatoid factor; Future  -     CBC auto differential; Future  -     Comprehensive metabolic panel; Future  -     C-reactive protein; Future  -     TSH; Future  -     T4, free; Future  -     T3, free; Future  -     IgG; Future  -     IgG 1, 2, 3, and 4; Future  -     IgM; Future  -     SALEEM  -     Anti-DNA antibody, double-stranded  -     Anti-Histone Antibody  -     Anti Sm/RNP Antibody  -     Anti-scleroderma antibody  -     Anti-Smith antibody  -     Sjogrens syndrome-A extractable nuclear antibody  -     Sjogrens syndrome-B extractable nuclear antibody  -     Sedimentation rate, manual  -     Rheumatoid factor  -     CBC auto differential  -     Comprehensive metabolic panel  -     C-reactive protein  -     TSH  -     T4, free  -     T3, free  -     IgG  -     IgG 1, 2, 3, and 4  -     IgM    Cervical (neck) region somatic dysfunction  -     ibuprofen-famotidine (DUEXIS) 800-26.6 mg Tab; Take 1 tablet by mouth 3 (three) times daily.  -     ketorolac injection 60 mg; Inject 2 mLs (60 mg total) into the muscle one time.  -     methylPREDNISolone acetate injection 160 mg; Inject 2 mLs (160 mg total) into the muscle one time.    Other orders  -     cyanocobalamin injection 1,000 mcg; Inject 1 mL (1,000 mcg total) into the muscle one time.

## 2017-12-15 NOTE — PROGRESS NOTES
Administered 1 cc Vitamin B12 1000mcg/cc to right ventrogluteal. Pt tolerated well. No acute reaction noted to site. Pt instructed on S/S to report. Advised patient to wait in lobby 15 minutes after receiving injection to monitor for any reactions. Pt verbalized understanding.     Lot: [id number]  Exp: Apr19  Administered 2 cc DepoMedrol 80mg/cc  to right ventrogluteal. Pt tolerated well. No acute reaction noted to site. Pt instructed on S/S to report. Advised patient to wait in lobby 15 minutes after receiving injection to monitor for any reactions..  Pt verbalized understanding.     Lot: [id number]  Exp: 11/2018  Administered 2 cc  Toradol 30mg/cc  to righ dorsogluteal. Pt tolerated well. No acute reaction noted to site. Pt instructed on S/S to report. Advised patient to wait in lobby 15 minutes after receiving injection to monitor for any reactions. Pt verbalized understanding.     Lot: [id number]  Exp: 05/19

## 2018-06-11 ENCOUNTER — OFFICE VISIT (OUTPATIENT)
Dept: RHEUMATOLOGY | Facility: CLINIC | Age: 53
End: 2018-06-11
Payer: COMMERCIAL

## 2018-06-11 VITALS
SYSTOLIC BLOOD PRESSURE: 120 MMHG | HEART RATE: 81 BPM | HEIGHT: 65 IN | BODY MASS INDEX: 29.65 KG/M2 | WEIGHT: 177.94 LBS | DIASTOLIC BLOOD PRESSURE: 74 MMHG

## 2018-06-11 DIAGNOSIS — M99.01 CERVICAL (NECK) REGION SOMATIC DYSFUNCTION: ICD-10-CM

## 2018-06-11 DIAGNOSIS — D83.9 CVID (COMMON VARIABLE IMMUNODEFICIENCY): ICD-10-CM

## 2018-06-11 DIAGNOSIS — M35.00 SJOGREN'S SYNDROME, WITH UNSPECIFIED ORGAN INVOLVEMENT: Primary | ICD-10-CM

## 2018-06-11 PROCEDURE — 3008F BODY MASS INDEX DOCD: CPT | Mod: CPTII,S$GLB,, | Performed by: INTERNAL MEDICINE

## 2018-06-11 PROCEDURE — 99999 PR PBB SHADOW E&M-EST. PATIENT-LVL III: CPT | Mod: PBBFAC,,, | Performed by: INTERNAL MEDICINE

## 2018-06-11 PROCEDURE — 99214 OFFICE O/P EST MOD 30 MIN: CPT | Mod: S$GLB,,, | Performed by: INTERNAL MEDICINE

## 2018-06-11 RX ORDER — FUROSEMIDE 40 MG/1
40 TABLET ORAL DAILY
Qty: 30 TABLET | Refills: 6 | Status: SHIPPED | OUTPATIENT
Start: 2018-06-11 | End: 2019-09-11 | Stop reason: SDUPTHER

## 2018-06-11 RX ORDER — POTASSIUM CHLORIDE 20 MEQ/1
20 TABLET, EXTENDED RELEASE ORAL DAILY
Qty: 30 TABLET | Refills: 5 | Status: SHIPPED | OUTPATIENT
Start: 2018-06-11 | End: 2018-11-13 | Stop reason: SDUPTHER

## 2018-06-11 ASSESSMENT — ROUTINE ASSESSMENT OF PATIENT INDEX DATA (RAPID3)
MDHAQ FUNCTION SCORE: .6
PSYCHOLOGICAL DISTRESS SCORE: 1.1
TOTAL RAPID3 SCORE: 3.67
PATIENT GLOBAL ASSESSMENT SCORE: 5
PAIN SCORE: 4

## 2018-06-11 NOTE — PROGRESS NOTES
Subjective:       Patient ID: Crystal Hein is a 52 y.o. female.    Chief Complaint: Follow-up    F/u: she has  ra and sjogren's  And off meds. It sinus infection multiple, dizziness vertigo,Patient complains of arthralgias and myalgias for which has been present for a few years. Pain is located in multiple joints, both shoulder(s), both elbow(s), both wrist(s), both MCP(s): 1st, 2nd, 3rd, 4th and 5th, both PIP(s): 1st, 2nd, 3rd, 4th and 5th, both DIP(s): 1st and 2nd, both hip(s), both knee(s) and both MTP(s): 1st, 2nd, 3rd, 4th and 5th, is described as aching, pulsating, shooting and throbbing, and is constant, moderate .  Associated symptoms include: crepitation, decreased range of motion, edema, effusion, tenderness and warmth.        Review of Systems   Constitutional: Positive for chills. Negative for activity change, appetite change, diaphoresis and unexpected weight change.   HENT: Negative for congestion, dental problem, ear discharge, ear pain, facial swelling, mouth sores, nosebleeds, postnasal drip, rhinorrhea, sinus pressure, sneezing, sore throat, tinnitus and voice change.    Eyes: Negative for photophobia, pain, discharge, redness and itching.   Respiratory: Negative for apnea, cough, chest tightness, shortness of breath and wheezing.    Cardiovascular: Positive for leg swelling. Negative for chest pain and palpitations.   Gastrointestinal: Positive for abdominal pain. Negative for abdominal distention, constipation, diarrhea, nausea and vomiting.   Endocrine: Negative for cold intolerance, heat intolerance, polydipsia and polyuria.   Genitourinary: Negative for decreased urine volume, difficulty urinating, flank pain, frequency, hematuria and urgency.   Musculoskeletal: Positive for arthralgias, back pain, gait problem, neck pain and neck stiffness.   Skin: Negative for pallor, rash and wound.   Allergic/Immunologic: Negative for immunocompromised state.   Neurological: Positive for weakness.  "Negative for dizziness, tremors and numbness.   Hematological: Negative for adenopathy. Does not bruise/bleed easily.   Psychiatric/Behavioral: Negative for sleep disturbance. The patient is not nervous/anxious.          Objective:     /74 (BP Location: Left arm, Patient Position: Sitting, BP Method: Medium (Automatic))   Pulse 81   Ht 5' 5" (1.651 m)   Wt 80.7 kg (177 lb 14.6 oz)   BMI 29.61 kg/m²      Physical Exam   Vitals reviewed.  Constitutional: She is oriented to person, place, and time. She appears distressed.   HENT:   Head: Normocephalic and atraumatic.   Eyes: EOM are normal. Pupils are equal, round, and reactive to light. Right eye exhibits no discharge. Left eye exhibits no discharge.   Neck: Neck supple. No thyromegaly present.   Cardiovascular: Normal rate, regular rhythm and normal heart sounds.  Exam reveals no gallop and no friction rub.    No murmur heard.  Pulmonary/Chest: Breath sounds normal. She has no wheezes. She has no rales. She exhibits no tenderness.   Abdominal: There is no tenderness. There is no rebound and no guarding.       Right Side Rheumatological Exam     Examination finds the elbow normal.    The patient is tender to palpation of the shoulder, wrist, knee, 1st PIP, 1st MCP, 2nd PIP, 2nd MCP, 3rd PIP, 3rd MCP, 4th PIP, 4th MCP, 5th PIP and 5th MCP    She has swelling of the 1st PIP, 1st MCP, 2nd PIP, 2nd MCP, 3rd PIP, 3rd MCP, 4th PIP, 4th MCP, 5th PIP and 5th MCP    Shoulder Exam   Tenderness Location: no tenderness    Range of Motion   Active Abduction: abnormal   Adduction: abnormal  Sensation: normal    Knee Exam   Patellofemoral Crepitus: positive  Effusion: positive  Sensation: normal    Hip Exam   Tenderness Location: posterior  Sensation: normal    Elbow/Wrist Exam   Tenderness Location: no tenderness  Sensation: normal    Left Side Rheumatological Exam     The patient is tender to palpation of the shoulder, elbow, wrist, knee, 1st PIP, 1st MCP, 2nd PIP, 2nd " MCP, 3rd PIP, 3rd MCP, 4th PIP, 4th MCP, 5th PIP and 5th MCP.    She has swelling of the 1st PIP, 1st MCP, 2nd PIP, 2nd MCP, 3rd PIP, 3rd MCP, 4th PIP, 4th MCP, 5th PIP and 5th MCP    Shoulder Exam   Tenderness Location: no tenderness    Range of Motion   Active Abduction: abnormal   Sensation: normal    Knee Exam     Patellofemoral Crepitus: positive  Effusion: positive  Sensation: normal    Hip Exam   Tenderness Location: posterior  Sensation: normal    Elbow/Wrist Exam   Sensation: normal      Back/Neck Exam   General Inspection   Gait: normal         Lymphadenopathy:     She has no cervical adenopathy.   Neurological: She is alert and oriented to person, place, and time. Gait normal.   Skin: Skin is dry. No rash noted. No erythema. There is pallor.     Psychiatric: Mood and affect normal.   Musculoskeletal: She exhibits tenderness and deformity. She exhibits no edema.             pt had external labs done, we reviewed  the results at this visit and the lab results will be scanned into the  Media, igg subclass 1, ssb 2.5, edil 1:    Assessment:          Encounter Diagnoses   Name Primary?    Sjogren's syndrome, with unspecified organ involvement Yes    CVID (common variable immunodeficiency)     Cervical (neck) region somatic dysfunction          Plan:     [place] was seen today for follow-up.    Diagnoses and all orders for this visit:    Sjogren's syndrome, with unspecified organ involvement  -     IgA; Future  -     IgG; Future  -     IgG 1, 2, 3, and 4; Future  -     IgM; Future  -     Comprehensive metabolic panel; Future  -     CBC auto differential; Future  -     EDIL; Future  -     Sjogrens syndrome-A extractable nuclear antibody; Future  -     Sjogrens syndrome-B extractable nuclear antibody; Future  -     C-reactive protein; Future  -     Sedimentation rate; Future  -     Lymphocyte Profile II; Future  -     ACTH; Future  -     IgA  -     IgG  -     IgG 1, 2, 3, and 4  -     IgM  -     Comprehensive  metabolic panel  -     CBC auto differential  -     SALEEM  -     Sjogrens syndrome-A extractable nuclear antibody  -     Sjogrens syndrome-B extractable nuclear antibody  -     C-reactive protein  -     Sedimentation rate  -     Lymphocyte Profile II  -     ACTH  -     furosemide (LASIX) 40 MG tablet; Take 1 tablet (40 mg total) by mouth once daily.  -     potassium chloride SA (K-DUR,KLOR-CON) 20 MEQ tablet; Take 1 tablet (20 mEq total) by mouth once daily.    CVID (common variable immunodeficiency)  -     IgA; Future  -     IgG; Future  -     IgG 1, 2, 3, and 4; Future  -     IgM; Future  -     Comprehensive metabolic panel; Future  -     CBC auto differential; Future  -     SALEEM; Future  -     Sjogrens syndrome-A extractable nuclear antibody; Future  -     Sjogrens syndrome-B extractable nuclear antibody; Future  -     C-reactive protein; Future  -     Sedimentation rate; Future  -     Lymphocyte Profile II; Future  -     ACTH; Future  -     IgA  -     IgG  -     IgG 1, 2, 3, and 4  -     IgM  -     Comprehensive metabolic panel  -     CBC auto differential  -     SALEEM  -     Sjogrens syndrome-A extractable nuclear antibody  -     Sjogrens syndrome-B extractable nuclear antibody  -     C-reactive protein  -     Sedimentation rate  -     Lymphocyte Profile II  -     ACTH  -     furosemide (LASIX) 40 MG tablet; Take 1 tablet (40 mg total) by mouth once daily.  -     potassium chloride SA (K-DUR,KLOR-CON) 20 MEQ tablet; Take 1 tablet (20 mEq total) by mouth once daily.    Cervical (neck) region somatic dysfunction  -     IgA; Future  -     IgG; Future  -     IgG 1, 2, 3, and 4; Future  -     IgM; Future  -     Comprehensive metabolic panel; Future  -     CBC auto differential; Future  -     SALEEM; Future  -     Sjogrens syndrome-A extractable nuclear antibody; Future  -     Sjogrens syndrome-B extractable nuclear antibody; Future  -     C-reactive protein; Future  -     Sedimentation rate; Future  -     Lymphocyte Profile  II; Future  -     ACTH; Future  -     IgA  -     IgG  -     IgG 1, 2, 3, and 4  -     IgM  -     Comprehensive metabolic panel  -     CBC auto differential  -     SALEEM  -     Sjogrens syndrome-A extractable nuclear antibody  -     Sjogrens syndrome-B extractable nuclear antibody  -     C-reactive protein  -     Sedimentation rate  -     Lymphocyte Profile II  -     ACTH  -     furosemide (LASIX) 40 MG tablet; Take 1 tablet (40 mg total) by mouth once daily.  -     potassium chloride SA (K-DUR,KLOR-CON) 20 MEQ tablet; Take 1 tablet (20 mEq total) by mouth once daily.

## 2018-08-13 ENCOUNTER — TELEPHONE (OUTPATIENT)
Dept: RHEUMATOLOGY | Facility: CLINIC | Age: 53
End: 2018-08-13

## 2018-08-13 RX ORDER — NALTREXONE HCL 100 %
4 POWDER (GRAM) MISCELLANEOUS NIGHTLY
Qty: 1 BOTTLE | Refills: 0 | Status: SHIPPED | OUTPATIENT
Start: 2018-08-13 | End: 2018-11-13

## 2018-08-14 ENCOUNTER — TELEPHONE (OUTPATIENT)
Dept: RHEUMATOLOGY | Facility: CLINIC | Age: 53
End: 2018-08-14

## 2018-08-14 NOTE — TELEPHONE ENCOUNTER
Spoke to Lidia at [facility], they are unclear on the Naltrexone. Advised that it comes in 50mg tablet. Need clarification on dose and if powder or tablet.  Advises she is trying to prescribe 4.5mg low dose naltrexone capsules.   Spoke to [facility], they also do not carry naltrexone or compound.   Spoke to Courtney at [facility], they are able to compund Naltrexone 4.5mg. Gave Verbal order per Dr. Truong, Naltrexone 4.5mg disp: 30 refills: 0  Notified pt medication called to pharmacy.

## 2018-08-14 NOTE — TELEPHONE ENCOUNTER
----- Message from Marguerite Campuzano sent at 8/14/2018  9:08 AM CDT -----  Contact: Lidia with [facility]  Type:  Pharmacy Calling to Clarify an RX    Name of Caller:  Lidia  [facility]  Prescription Name:  naltrexone HCl, bulk, 100 % Powd  What do they need to clarify?:  They are not sure what this prescription is  Best Call Back Number:  [phone]  Additional Information:

## 2018-08-14 NOTE — TELEPHONE ENCOUNTER
----- Message from Eri Us sent at 8/14/2018  1:19 PM CDT -----  Contact: [facility]   [facility] needs to speak to the nurse about medication naltrexone HCl, bulk, 100 % Powd for patient     Please call back [phone]

## 2018-09-05 ENCOUNTER — PATIENT MESSAGE (OUTPATIENT)
Dept: RHEUMATOLOGY | Facility: CLINIC | Age: 53
End: 2018-09-05

## 2018-09-05 DIAGNOSIS — G47.26 SHIFT WORK SLEEP DISORDER: ICD-10-CM

## 2018-09-05 DIAGNOSIS — M47.25 OSTEOARTHRITIS OF SPINE WITH RADICULOPATHY, THORACOLUMBAR REGION: ICD-10-CM

## 2018-09-05 DIAGNOSIS — D83.9 CVID (COMMON VARIABLE IMMUNODEFICIENCY): ICD-10-CM

## 2018-09-05 DIAGNOSIS — M35.00 SJOGREN'S SYNDROME, WITH UNSPECIFIED ORGAN INVOLVEMENT: ICD-10-CM

## 2018-09-05 DIAGNOSIS — M02.30 REACTIVE ARTHRITIS: ICD-10-CM

## 2018-09-05 RX ORDER — ESTRADIOL 2 MG/1
TABLET ORAL
Qty: 60 TABLET | Refills: 3 | Status: CANCELLED | OUTPATIENT
Start: 2018-09-05

## 2018-09-06 RX ORDER — ESTRADIOL 2 MG/1
4 TABLET ORAL DAILY
Qty: 60 TABLET | Refills: 3 | Status: SHIPPED | OUTPATIENT
Start: 2018-09-06 | End: 2018-11-20 | Stop reason: SDUPTHER

## 2018-09-06 RX ORDER — HYDROXYCHLOROQUINE SULFATE 200 MG/1
200 TABLET, FILM COATED ORAL 2 TIMES DAILY
Qty: 60 TABLET | Refills: 11 | Status: SHIPPED | OUTPATIENT
Start: 2018-09-06 | End: 2018-11-20 | Stop reason: SDUPTHER

## 2018-10-10 ENCOUNTER — PATIENT MESSAGE (OUTPATIENT)
Dept: RHEUMATOLOGY | Facility: CLINIC | Age: 53
End: 2018-10-10

## 2018-10-21 ENCOUNTER — PATIENT MESSAGE (OUTPATIENT)
Dept: RHEUMATOLOGY | Facility: CLINIC | Age: 53
End: 2018-10-21

## 2018-10-31 LAB
ACTH PLAS-MCNC: 12 PG/ML (ref 6–50)
ALBUMIN SERPL-MCNC: 4 G/DL (ref 3.6–5.1)
ALBUMIN/GLOB SERPL: 1.7 (CALC) (ref 1–2.5)
ALP SERPL-CCNC: 60 U/L (ref 33–130)
ALT SERPL-CCNC: 18 U/L (ref 6–29)
ANA PAT SER IF-IMP: ABNORMAL
ANA SER QL IF: POSITIVE
ANA TITR SER IF: ABNORMAL TITER
AST SERPL-CCNC: 19 U/L (ref 10–35)
BASOPHILS # BLD AUTO: 38 CELLS/UL (ref 0–200)
BASOPHILS NFR BLD AUTO: 0.7 %
BILIRUB SERPL-MCNC: 0.3 MG/DL (ref 0.2–1.2)
BUN SERPL-MCNC: 13 MG/DL (ref 7–25)
BUN/CREAT SERPL: NORMAL (CALC) (ref 6–22)
CALCIUM SERPL-MCNC: 8.9 MG/DL (ref 8.6–10.4)
CD19 CELLS # BLD: 222 CELLS/UL (ref 110–660)
CD19 CELLS NFR BLD: 10 % (ref 6–29)
CD3 CELLS # BLD: 1760 CELLS/UL (ref 840–3060)
CD3 CELLS NFR BLD: 83 % (ref 57–85)
CD3+CD4+ CELLS # BLD: 863 CELLS/UL (ref 490–1740)
CD3+CD4+ CELLS NFR BLD: 42 % (ref 30–61)
CD3+CD4+ CELLS/CD3+CD8+ CLL BLD: 1.05 % (ref 0.86–5)
CD3+CD8+ CELLS # BLD: 824 CELLS/UL (ref 180–1170)
CD3+CD8+ CELLS NFR BLD: 40 % (ref 12–42)
CHLORIDE SERPL-SCNC: 104 MMOL/L (ref 98–110)
CO2 SERPL-SCNC: 28 MMOL/L (ref 20–32)
CREAT SERPL-MCNC: 0.76 MG/DL (ref 0.5–1.05)
CRP SERPL-MCNC: 3.3 MG/L
ENA SS-A AB SER IA-ACNC: ABNORMAL AI
ENA SS-B AB SER IA-ACNC: ABNORMAL AI
EOSINOPHIL # BLD AUTO: 167 CELLS/UL (ref 15–500)
EOSINOPHIL NFR BLD AUTO: 3.1 %
ERYTHROCYTE [DISTWIDTH] IN BLOOD BY AUTOMATED COUNT: 12.1 % (ref 11–15)
ERYTHROCYTE [SEDIMENTATION RATE] IN BLOOD BY WESTERGREN METHOD: 2 MM/H
GFR SERPL CREATININE-BSD FRML MDRD: 90 ML/MIN/1.73M2
GLOBULIN SER CALC-MCNC: 2.3 G/DL (CALC) (ref 1.9–3.7)
GLUCOSE SERPL-MCNC: 91 MG/DL (ref 65–99)
HCT VFR BLD AUTO: 34.8 % (ref 35–45)
HGB BLD-MCNC: 12.1 G/DL (ref 11.7–15.5)
IGA SERPL-MCNC: 192 MG/DL (ref 81–463)
IGG SERPL-MCNC: 695 MG/DL (ref 694–1618)
IGG1 SER-MCNC: 334 MG/DL (ref 382–929)
IGG2 SER-MCNC: 251 MG/DL (ref 241–700)
IGG3 SER-MCNC: 80 MG/DL (ref 22–178)
IGG4 SER-MCNC: 13.8 MG/DL (ref 4–86)
IGM SERPL-MCNC: 52 MG/DL (ref 48–271)
LYMPHOCYTES # BLD AUTO: 2014 CELLS/UL (ref 850–3900)
LYMPHOCYTES # BLD AUTO: 2118 CELLS/UL (ref 850–3900)
LYMPHOCYTES NFR BLD AUTO: 37.3 %
MCH RBC QN AUTO: 30.8 PG (ref 27–33)
MCHC RBC AUTO-ENTMCNC: 34.8 G/DL (ref 32–36)
MCV RBC AUTO: 88.5 FL (ref 80–100)
MONOCYTES # BLD AUTO: 416 CELLS/UL (ref 200–950)
MONOCYTES NFR BLD AUTO: 7.7 %
NEUTROPHILS # BLD AUTO: 2765 CELLS/UL (ref 1500–7800)
NEUTROPHILS NFR BLD AUTO: 51.2 %
PLATELET # BLD AUTO: 268 THOUSAND/UL (ref 140–400)
PMV BLD REES-ECKER: 10.9 FL (ref 7.5–12.5)
POTASSIUM SERPL-SCNC: 4.2 MMOL/L (ref 3.5–5.3)
PROT SERPL-MCNC: 6.3 G/DL (ref 6.1–8.1)
RBC # BLD AUTO: 3.93 MILLION/UL (ref 3.8–5.1)
SODIUM SERPL-SCNC: 139 MMOL/L (ref 135–146)
WBC # BLD AUTO: 5.4 THOUSAND/UL (ref 3.8–10.8)

## 2018-11-13 ENCOUNTER — OFFICE VISIT (OUTPATIENT)
Dept: RHEUMATOLOGY | Facility: CLINIC | Age: 53
End: 2018-11-13
Payer: COMMERCIAL

## 2018-11-13 VITALS
HEIGHT: 65 IN | BODY MASS INDEX: 29.05 KG/M2 | DIASTOLIC BLOOD PRESSURE: 75 MMHG | HEART RATE: 87 BPM | WEIGHT: 174.38 LBS | SYSTOLIC BLOOD PRESSURE: 135 MMHG

## 2018-11-13 DIAGNOSIS — J32.9 OTHER SINUSITIS, UNSPECIFIED CHRONICITY: ICD-10-CM

## 2018-11-13 DIAGNOSIS — R10.11 RUQ ABDOMINAL PAIN: Primary | ICD-10-CM

## 2018-11-13 DIAGNOSIS — M99.01 CERVICAL (NECK) REGION SOMATIC DYSFUNCTION: ICD-10-CM

## 2018-11-13 DIAGNOSIS — D83.9 IMMUNODEFICIENCY, COMMON VARIABLE: ICD-10-CM

## 2018-11-13 DIAGNOSIS — R63.5 WEIGHT GAIN: ICD-10-CM

## 2018-11-13 DIAGNOSIS — N64.4 BREAST PAIN, RIGHT: ICD-10-CM

## 2018-11-13 DIAGNOSIS — M35.00 SJOGREN'S SYNDROME, WITH UNSPECIFIED ORGAN INVOLVEMENT: ICD-10-CM

## 2018-11-13 DIAGNOSIS — M02.30 REACTIVE ARTHRITIS: ICD-10-CM

## 2018-11-13 DIAGNOSIS — K21.9 GASTROESOPHAGEAL REFLUX DISEASE, ESOPHAGITIS PRESENCE NOT SPECIFIED: ICD-10-CM

## 2018-11-13 DIAGNOSIS — D83.9 CVID (COMMON VARIABLE IMMUNODEFICIENCY): ICD-10-CM

## 2018-11-13 PROCEDURE — 3008F BODY MASS INDEX DOCD: CPT | Mod: CPTII,S$GLB,, | Performed by: INTERNAL MEDICINE

## 2018-11-13 PROCEDURE — 99999 PR PBB SHADOW E&M-EST. PATIENT-LVL IV: CPT | Mod: PBBFAC,,, | Performed by: INTERNAL MEDICINE

## 2018-11-13 PROCEDURE — 99214 OFFICE O/P EST MOD 30 MIN: CPT | Mod: 25,S$GLB,, | Performed by: INTERNAL MEDICINE

## 2018-11-13 PROCEDURE — 96372 THER/PROPH/DIAG INJ SC/IM: CPT | Mod: S$GLB,,, | Performed by: INTERNAL MEDICINE

## 2018-11-13 RX ORDER — IBUPROFEN AND FAMOTIDINE 26.6; 8 MG/1; MG/1
1 TABLET ORAL 3 TIMES DAILY
Qty: 90 TABLET | Refills: 6 | Status: SHIPPED | OUTPATIENT
Start: 2018-11-13 | End: 2019-09-11 | Stop reason: SDUPTHER

## 2018-11-13 RX ORDER — SUCRALFATE 1 G/1
1 TABLET ORAL 4 TIMES DAILY
Qty: 60 TABLET | Refills: 5 | Status: SHIPPED | OUTPATIENT
Start: 2018-11-13 | End: 2020-05-06 | Stop reason: SDUPTHER

## 2018-11-13 RX ORDER — AZITHROMYCIN 250 MG/1
TABLET, FILM COATED ORAL
Qty: 10 TABLET | Refills: 0 | Status: SHIPPED | OUTPATIENT
Start: 2018-11-13 | End: 2019-04-30 | Stop reason: ALTCHOICE

## 2018-11-13 RX ORDER — CEFTRIAXONE 1 G/1
1 INJECTION, POWDER, FOR SOLUTION INTRAMUSCULAR; INTRAVENOUS
Status: COMPLETED | OUTPATIENT
Start: 2018-11-13 | End: 2018-11-13

## 2018-11-13 RX ORDER — KETOROLAC TROMETHAMINE 30 MG/ML
60 INJECTION, SOLUTION INTRAMUSCULAR; INTRAVENOUS
Status: COMPLETED | OUTPATIENT
Start: 2018-11-13 | End: 2018-11-13

## 2018-11-13 RX ORDER — POTASSIUM CHLORIDE 20 MEQ/1
20 TABLET, EXTENDED RELEASE ORAL DAILY
Qty: 30 TABLET | Refills: 5 | Status: SHIPPED | OUTPATIENT
Start: 2018-11-13 | End: 2024-03-01

## 2018-11-13 RX ORDER — PANTOPRAZOLE SODIUM 40 MG/1
40 TABLET, DELAYED RELEASE ORAL DAILY
Qty: 30 TABLET | Refills: 11 | Status: SHIPPED | OUTPATIENT
Start: 2018-11-13 | End: 2022-01-25 | Stop reason: SDUPTHER

## 2018-11-13 RX ADMIN — KETOROLAC TROMETHAMINE 60 MG: 30 INJECTION, SOLUTION INTRAMUSCULAR; INTRAVENOUS at 06:11

## 2018-11-13 RX ADMIN — CEFTRIAXONE 1 G: 1 INJECTION, POWDER, FOR SOLUTION INTRAMUSCULAR; INTRAVENOUS at 06:11

## 2018-11-13 NOTE — PROGRESS NOTES
Subjective:       Patient ID: Crystal Hein is a 53 y.o. female.    Chief Complaint: Follow-up    F/u: she has  ra and sjogren's off all tx, edil positive igg subclass1 low, sinusitis infections multiple and overall felt bad on plaquenil.now has RUQ pain with epigastric severePatient complains of arthralgias and myalgias for which has been present for a few years. Pain is located in multiple joints, both shoulder(s), both elbow(s), both wrist(s), both MCP(s): 1st, 2nd, 3rd, 4th and 5th, both PIP(s): 1st, 2nd, 3rd, 4th and 5th, both DIP(s): 1st and 2nd, both hip(s), both knee(s) and both MTP(s): 1st, 2nd, 3rd, 4th and 5th, is described as aching, pulsating, shooting and throbbing, and is constant, moderate .  Associated symptoms include: crepitation, decreased range of motion, edema, effusion, tenderness and warmth.        Review of Systems   Constitutional: Positive for chills. Negative for activity change, appetite change, diaphoresis and unexpected weight change.   HENT: Negative for congestion, dental problem, ear discharge, ear pain, facial swelling, mouth sores, nosebleeds, postnasal drip, rhinorrhea, sinus pressure, sneezing, sore throat, tinnitus and voice change.    Eyes: Negative for photophobia, pain, discharge, redness and itching.   Respiratory: Negative for apnea, cough, chest tightness, shortness of breath and wheezing.    Cardiovascular: Positive for leg swelling. Negative for chest pain and palpitations.   Gastrointestinal: Positive for abdominal pain. Negative for abdominal distention, constipation, diarrhea, nausea and vomiting.   Endocrine: Negative for cold intolerance, heat intolerance, polydipsia and polyuria.   Genitourinary: Negative for decreased urine volume, difficulty urinating, flank pain, frequency, hematuria and urgency.   Musculoskeletal: Positive for arthralgias, back pain, gait problem, neck pain and neck stiffness.   Skin: Negative for pallor, rash and wound.  "  Allergic/Immunologic: Negative for immunocompromised state.   Neurological: Positive for weakness. Negative for dizziness, tremors and numbness.   Hematological: Negative for adenopathy. Does not bruise/bleed easily.   Psychiatric/Behavioral: Negative for sleep disturbance. The patient is not nervous/anxious.          Objective:     /75 (BP Location: Left arm, Patient Position: Sitting, BP Method: Medium (Automatic))   Pulse 87   Ht 5' 5" (1.651 m)   Wt 79.1 kg (174 lb 6.1 oz)   BMI 29.02 kg/m²      Physical Exam   Vitals reviewed.  Constitutional: She is oriented to person, place, and time. She appears distressed.   HENT:   Head: Normocephalic and atraumatic.   Eyes: EOM are normal. Pupils are equal, round, and reactive to light. Right eye exhibits no discharge. Left eye exhibits no discharge.   Neck: Neck supple. No thyromegaly present.   Cardiovascular: Normal rate, regular rhythm and normal heart sounds.  Exam reveals no gallop and no friction rub.    No murmur heard.  Pulmonary/Chest: Breath sounds normal. She has no wheezes. She has no rales. She exhibits no tenderness.   Abdominal: There is no tenderness. There is no rebound and no guarding.       Right Side Rheumatological Exam     Examination finds the elbow normal.    The patient is tender to palpation of the shoulder, wrist, knee, 1st PIP, 1st MCP, 2nd PIP, 2nd MCP, 3rd PIP, 3rd MCP, 4th PIP, 4th MCP, 5th PIP and 5th MCP    She has swelling of the 1st PIP, 1st MCP, 2nd PIP, 2nd MCP, 3rd PIP, 3rd MCP, 4th PIP, 4th MCP, 5th PIP and 5th MCP    Shoulder Exam   Tenderness Location: no tenderness    Range of Motion   Active abduction: abnormal   Adduction: abnormal  Sensation: normal    Knee Exam   Patellofemoral Crepitus: positive  Effusion: positive  Sensation: normal    Hip Exam   Tenderness Location: posterior  Sensation: normal    Elbow/Wrist Exam   Tenderness Location: no tenderness  Sensation: normal    Left Side Rheumatological Exam     The " patient is tender to palpation of the shoulder, elbow, wrist, knee, 1st PIP, 1st MCP, 2nd PIP, 2nd MCP, 3rd PIP, 3rd MCP, 4th PIP, 4th MCP, 5th PIP and 5th MCP.    She has swelling of the 1st PIP, 1st MCP, 2nd PIP, 2nd MCP, 3rd PIP, 3rd MCP, 4th PIP, 4th MCP, 5th PIP and 5th MCP    Shoulder Exam   Tenderness Location: no tenderness    Range of Motion   Active abduction: abnormal   Sensation: normal    Knee Exam     Patellofemoral Crepitus: positive  Effusion: positive  Sensation: normal    Hip Exam   Tenderness Location: posterior  Sensation: normal    Elbow/Wrist Exam   Sensation: normal      Back/Neck Exam   General Inspection   Gait: normal         Lymphadenopathy:     She has no cervical adenopathy.   Neurological: She is alert and oriented to person, place, and time. Gait normal.   Skin: Skin is dry. No rash noted. No erythema. There is pallor.     Psychiatric: Mood and affect normal.   Musculoskeletal: She exhibits tenderness and deformity. She exhibits no edema.             pt had external labs done, we reviewed  the results at this visit and the lab results will be scanned into the  Media, igg subclass 1, ssb 2.5, edil 1:    Assessment:          Encounter Diagnoses   Name Primary?    RUQ abdominal pain Yes    Cervical (neck) region somatic dysfunction     Sjogren's syndrome, with unspecified organ involvement     CVID (common variable immunodeficiency)     Sjogren's syndrome     Weight gain     Other sinusitis, unspecified chronicity     Gastroesophageal reflux disease, esophagitis presence not specified     Reactive arthritis     Immunodeficiency, common variable     Shift work sleep disorder     Breast pain, right          Plan:     [place] was seen today for follow-up.    Diagnoses and all orders for this visit:    RUQ abdominal pain  -     azithromycin (Z-ADENIKE) 250 MG tablet; Take 2 tablets by mouth on daily x 5 days  -     X-Ray Chest PA And Lateral; Future    Cervical (neck) region somatic  dysfunction  -     ibuprofen-famotidine (DUEXIS) 800-26.6 mg Tab; Take 1 tablet by mouth 3 (three) times daily.  -     potassium chloride SA (K-DUR,KLOR-CON) 20 MEQ tablet; Take 1 tablet (20 mEq total) by mouth once daily.  -     sucralfate (CARAFATE) 1 gram tablet; Take 1 tablet (1 g total) by mouth 4 (four) times daily.  -     US Abdomen Complete; Future  -     pantoprazole (PROTONIX) 40 MG tablet; Take 1 tablet (40 mg total) by mouth once daily.  -     ketorolac injection 60 mg  -     cefTRIAXone injection 1 g    Sjogren's syndrome, with unspecified organ involvement  -     ibuprofen-famotidine (DUEXIS) 800-26.6 mg Tab; Take 1 tablet by mouth 3 (three) times daily.  -     potassium chloride SA (K-DUR,KLOR-CON) 20 MEQ tablet; Take 1 tablet (20 mEq total) by mouth once daily.  -     sucralfate (CARAFATE) 1 gram tablet; Take 1 tablet (1 g total) by mouth 4 (four) times daily.  -     US Abdomen Complete; Future  -     pantoprazole (PROTONIX) 40 MG tablet; Take 1 tablet (40 mg total) by mouth once daily.  -     ketorolac injection 60 mg  -     cefTRIAXone injection 1 g  -     azithromycin (Z-ADENIKE) 250 MG tablet; Take 2 tablets by mouth on daily x 5 days  -     X-Ray Chest PA And Lateral; Future    CVID (common variable immunodeficiency)  -     ibuprofen-famotidine (DUEXIS) 800-26.6 mg Tab; Take 1 tablet by mouth 3 (three) times daily.  -     potassium chloride SA (K-DUR,KLOR-CON) 20 MEQ tablet; Take 1 tablet (20 mEq total) by mouth once daily.  -     sucralfate (CARAFATE) 1 gram tablet; Take 1 tablet (1 g total) by mouth 4 (four) times daily.  -     US Abdomen Complete; Future  -     pantoprazole (PROTONIX) 40 MG tablet; Take 1 tablet (40 mg total) by mouth once daily.  -     ketorolac injection 60 mg  -     cefTRIAXone injection 1 g    Sjogren's syndrome  -     ibuprofen-famotidine (DUEXIS) 800-26.6 mg Tab; Take 1 tablet by mouth 3 (three) times daily.  -     potassium chloride SA (K-DUR,KLOR-CON) 20 MEQ tablet; Take  1 tablet (20 mEq total) by mouth once daily.  -     sucralfate (CARAFATE) 1 gram tablet; Take 1 tablet (1 g total) by mouth 4 (four) times daily.  -     US Abdomen Complete; Future  -     pantoprazole (PROTONIX) 40 MG tablet; Take 1 tablet (40 mg total) by mouth once daily.  -     ketorolac injection 60 mg  -     cefTRIAXone injection 1 g    Weight gain  -     ibuprofen-famotidine (DUEXIS) 800-26.6 mg Tab; Take 1 tablet by mouth 3 (three) times daily.  -     potassium chloride SA (K-DUR,KLOR-CON) 20 MEQ tablet; Take 1 tablet (20 mEq total) by mouth once daily.  -     sucralfate (CARAFATE) 1 gram tablet; Take 1 tablet (1 g total) by mouth 4 (four) times daily.  -     US Abdomen Complete; Future  -     pantoprazole (PROTONIX) 40 MG tablet; Take 1 tablet (40 mg total) by mouth once daily.  -     azithromycin (Z-ADENIKE) 250 MG tablet; Take 2 tablets by mouth on daily x 5 days  -     X-Ray Chest PA And Lateral; Future    Other sinusitis, unspecified chronicity  -     sucralfate (CARAFATE) 1 gram tablet; Take 1 tablet (1 g total) by mouth 4 (four) times daily.  -     azithromycin (Z-ADENIKE) 250 MG tablet; Take 2 tablets by mouth on daily x 5 days  -     X-Ray Chest PA And Lateral; Future    Gastroesophageal reflux disease, esophagitis presence not specified  -     ibuprofen-famotidine (DUEXIS) 800-26.6 mg Tab; Take 1 tablet by mouth 3 (three) times daily.  -     potassium chloride SA (K-DUR,KLOR-CON) 20 MEQ tablet; Take 1 tablet (20 mEq total) by mouth once daily.  -     sucralfate (CARAFATE) 1 gram tablet; Take 1 tablet (1 g total) by mouth 4 (four) times daily.  -     US Abdomen Complete; Future  -     pantoprazole (PROTONIX) 40 MG tablet; Take 1 tablet (40 mg total) by mouth once daily.  -     ketorolac injection 60 mg  -     cefTRIAXone injection 1 g    Reactive arthritis  -     pantoprazole (PROTONIX) 40 MG tablet; Take 1 tablet (40 mg total) by mouth once daily.  -     azithromycin (Z-ADENIKE) 250 MG tablet; Take 2 tablets by  mouth on daily x 5 days  -     X-Ray Chest PA And Lateral; Future    Immunodeficiency, common variable  -     pantoprazole (PROTONIX) 40 MG tablet; Take 1 tablet (40 mg total) by mouth once daily.  -     ketorolac injection 60 mg  -     cefTRIAXone injection 1 g  -     azithromycin (Z-ADENIKE) 250 MG tablet; Take 2 tablets by mouth on daily x 5 days    Shift work sleep disorder  -     pantoprazole (PROTONIX) 40 MG tablet; Take 1 tablet (40 mg total) by mouth once daily.    Breast pain, right  -     US Breast Right Complete; Future

## 2018-11-14 ENCOUNTER — PATIENT MESSAGE (OUTPATIENT)
Dept: RHEUMATOLOGY | Facility: CLINIC | Age: 53
End: 2018-11-14

## 2018-11-14 NOTE — PROGRESS NOTES
Administered 2 cc ( 30 mg/ml ) of toradol to the right upper outer gluteal. Informed of s/s to report verbalized understanding. No adverse reactions noted.    Lot # [id number]  Expiration 1 dec 2019    Administered 1 gram of rocephin mixed with 2.1 ml of lidocaine. Given in the left upper outer gluteal. Informed of s/s to report verbalized understanding. No adverse reactions noted.    Lot # [id number]  Expiration 03/2021

## 2018-11-15 ENCOUNTER — TELEPHONE (OUTPATIENT)
Dept: RHEUMATOLOGY | Facility: CLINIC | Age: 53
End: 2018-11-15

## 2018-11-16 ENCOUNTER — PATIENT MESSAGE (OUTPATIENT)
Dept: RHEUMATOLOGY | Facility: CLINIC | Age: 53
End: 2018-11-16

## 2018-11-16 ENCOUNTER — TELEPHONE (OUTPATIENT)
Dept: RADIOLOGY | Facility: HOSPITAL | Age: 53
End: 2018-11-16

## 2018-11-19 ENCOUNTER — HOSPITAL ENCOUNTER (OUTPATIENT)
Dept: RADIOLOGY | Facility: HOSPITAL | Age: 53
Discharge: HOME | End: 2018-11-19
Attending: INTERNAL MEDICINE
Payer: COMMERCIAL

## 2018-11-19 ENCOUNTER — PATIENT MESSAGE (OUTPATIENT)
Dept: RHEUMATOLOGY | Facility: CLINIC | Age: 53
End: 2018-11-19

## 2018-11-19 VITALS — WEIGHT: 174.38 LBS | BODY MASS INDEX: 29.05 KG/M2 | HEIGHT: 65 IN

## 2018-11-19 DIAGNOSIS — R10.11 RUQ ABDOMINAL PAIN: ICD-10-CM

## 2018-11-19 DIAGNOSIS — R07.89 PAIN, CHEST WALL: ICD-10-CM

## 2018-11-19 DIAGNOSIS — Z12.31 SCREENING MAMMOGRAM, ENCOUNTER FOR: ICD-10-CM

## 2018-11-19 DIAGNOSIS — M35.00 SJOGREN'S SYNDROME, WITH UNSPECIFIED ORGAN INVOLVEMENT: ICD-10-CM

## 2018-11-19 DIAGNOSIS — D83.9 CVID (COMMON VARIABLE IMMUNODEFICIENCY): ICD-10-CM

## 2018-11-19 DIAGNOSIS — M02.30 REACTIVE ARTHRITIS: ICD-10-CM

## 2018-11-19 DIAGNOSIS — M99.01 CERVICAL (NECK) REGION SOMATIC DYSFUNCTION: ICD-10-CM

## 2018-11-19 DIAGNOSIS — R63.5 WEIGHT GAIN: ICD-10-CM

## 2018-11-19 DIAGNOSIS — N64.4 BREAST PAIN: ICD-10-CM

## 2018-11-19 DIAGNOSIS — J32.9 OTHER SINUSITIS, UNSPECIFIED CHRONICITY: ICD-10-CM

## 2018-11-19 DIAGNOSIS — N64.4 BREAST PAIN, RIGHT: ICD-10-CM

## 2018-11-19 DIAGNOSIS — K21.9 GASTROESOPHAGEAL REFLUX DISEASE, ESOPHAGITIS PRESENCE NOT SPECIFIED: ICD-10-CM

## 2018-11-19 PROCEDURE — 76700 US EXAM ABDOM COMPLETE: CPT | Mod: 26,,, | Performed by: RADIOLOGY

## 2018-11-19 PROCEDURE — 76700 US EXAM ABDOM COMPLETE: CPT | Mod: TC,PO

## 2018-11-19 PROCEDURE — 77067 SCR MAMMO BI INCL CAD: CPT | Mod: 26,,, | Performed by: RADIOLOGY

## 2018-11-19 PROCEDURE — 71046 X-RAY EXAM CHEST 2 VIEWS: CPT | Mod: 26,,, | Performed by: RADIOLOGY

## 2018-11-19 PROCEDURE — 77067 SCR MAMMO BI INCL CAD: CPT | Mod: TC,PO

## 2018-11-19 PROCEDURE — 76604 US EXAM CHEST: CPT | Mod: 26,52,, | Performed by: RADIOLOGY

## 2018-11-19 PROCEDURE — 76999 ECHO EXAMINATION PROCEDURE: CPT | Mod: TC,PO

## 2018-11-19 PROCEDURE — 77063 BREAST TOMOSYNTHESIS BI: CPT | Mod: TC,PO

## 2018-11-19 PROCEDURE — 71046 X-RAY EXAM CHEST 2 VIEWS: CPT | Mod: TC,PO

## 2018-11-19 PROCEDURE — 77063 BREAST TOMOSYNTHESIS BI: CPT | Mod: 26,,, | Performed by: RADIOLOGY

## 2018-11-20 ENCOUNTER — PATIENT MESSAGE (OUTPATIENT)
Dept: RHEUMATOLOGY | Facility: CLINIC | Age: 53
End: 2018-11-20

## 2018-11-20 DIAGNOSIS — M02.30 REACTIVE ARTHRITIS: ICD-10-CM

## 2018-11-20 DIAGNOSIS — M35.00 SJOGREN'S SYNDROME, WITH UNSPECIFIED ORGAN INVOLVEMENT: ICD-10-CM

## 2018-11-20 DIAGNOSIS — M47.25 OSTEOARTHRITIS OF SPINE WITH RADICULOPATHY, THORACOLUMBAR REGION: ICD-10-CM

## 2018-11-20 DIAGNOSIS — G47.26 SHIFT WORK SLEEP DISORDER: ICD-10-CM

## 2018-11-20 DIAGNOSIS — D83.9 CVID (COMMON VARIABLE IMMUNODEFICIENCY): ICD-10-CM

## 2018-11-20 RX ORDER — HYDROXYCHLOROQUINE SULFATE 200 MG/1
200 TABLET, FILM COATED ORAL 2 TIMES DAILY
Qty: 60 TABLET | Refills: 5 | Status: SHIPPED | OUTPATIENT
Start: 2018-11-20 | End: 2019-04-30 | Stop reason: SDUPTHER

## 2018-11-20 RX ORDER — ESTRADIOL 2 MG/1
4 TABLET ORAL DAILY
Qty: 60 TABLET | Refills: 3 | Status: SHIPPED | OUTPATIENT
Start: 2018-11-20 | End: 2019-04-30 | Stop reason: SDUPTHER

## 2018-11-21 ENCOUNTER — PATIENT MESSAGE (OUTPATIENT)
Dept: RHEUMATOLOGY | Facility: CLINIC | Age: 53
End: 2018-11-21

## 2018-11-27 ENCOUNTER — PATIENT MESSAGE (OUTPATIENT)
Dept: RHEUMATOLOGY | Facility: CLINIC | Age: 53
End: 2018-11-27

## 2018-11-27 NOTE — TELEPHONE ENCOUNTER
Please advise in regards to fatty liver diagnosis and what pt should do in regards to this diagnosis. Advise if pt should discuss further with PCP.

## 2019-03-08 ENCOUNTER — DOCUMENTATION ONLY (OUTPATIENT)
Dept: RHEUMATOLOGY | Facility: CLINIC | Age: 54
End: 2019-03-08

## 2019-03-08 NOTE — PROGRESS NOTES
Received progress notes from Dr Mendoza. Patient was seen for consult 3-8-19 for diarrhea. Cipro prescribed for 5 days, stool studies ordered, return to clinic in 4 weeks for re evaluation. If symptoms still persistent recommend colonoscopy. Progress notes sent to scan

## 2019-04-16 ENCOUNTER — DOCUMENTATION ONLY (OUTPATIENT)
Dept: RHEUMATOLOGY | Facility: CLINIC | Age: 54
End: 2019-04-16

## 2019-04-16 NOTE — PROGRESS NOTES
Received progress notes from Dr Mendoza. Will schedule colon with MAC,s/p 14 day course of oral vanc first episode. No need to retest for clearance monitor for recurrence of diarrhea symptoms. Progress note sent to scan.

## 2019-04-30 ENCOUNTER — OFFICE VISIT (OUTPATIENT)
Dept: RHEUMATOLOGY | Facility: CLINIC | Age: 54
End: 2019-04-30
Payer: COMMERCIAL

## 2019-04-30 VITALS
WEIGHT: 174 LBS | DIASTOLIC BLOOD PRESSURE: 71 MMHG | RESPIRATION RATE: 12 BRPM | SYSTOLIC BLOOD PRESSURE: 119 MMHG | BODY MASS INDEX: 28.96 KG/M2 | HEART RATE: 98 BPM

## 2019-04-30 DIAGNOSIS — M02.30 REACTIVE ARTHRITIS: ICD-10-CM

## 2019-04-30 DIAGNOSIS — M47.25 OSTEOARTHRITIS OF SPINE WITH RADICULOPATHY, THORACOLUMBAR REGION: ICD-10-CM

## 2019-04-30 DIAGNOSIS — G47.26 SHIFT WORK SLEEP DISORDER: ICD-10-CM

## 2019-04-30 DIAGNOSIS — R09.81 SINUS CONGESTION: ICD-10-CM

## 2019-04-30 DIAGNOSIS — D83.9 CVID (COMMON VARIABLE IMMUNODEFICIENCY): Primary | ICD-10-CM

## 2019-04-30 DIAGNOSIS — D83.9 IMMUNODEFICIENCY, COMMON VARIABLE: ICD-10-CM

## 2019-04-30 DIAGNOSIS — M35.00 SJOGREN'S SYNDROME, WITH UNSPECIFIED ORGAN INVOLVEMENT: ICD-10-CM

## 2019-04-30 PROCEDURE — 3008F BODY MASS INDEX DOCD: CPT | Mod: CPTII,S$GLB,, | Performed by: INTERNAL MEDICINE

## 2019-04-30 PROCEDURE — 99999 PR PBB SHADOW E&M-EST. PATIENT-LVL III: CPT | Mod: PBBFAC,,, | Performed by: INTERNAL MEDICINE

## 2019-04-30 PROCEDURE — 99999 PR PBB SHADOW E&M-EST. PATIENT-LVL III: ICD-10-PCS | Mod: PBBFAC,,, | Performed by: INTERNAL MEDICINE

## 2019-04-30 PROCEDURE — 96372 PR INJECTION,THERAP/PROPH/DIAG2ST, IM OR SUBCUT: ICD-10-PCS | Mod: S$GLB,,, | Performed by: INTERNAL MEDICINE

## 2019-04-30 PROCEDURE — 3008F PR BODY MASS INDEX (BMI) DOCUMENTED: ICD-10-PCS | Mod: CPTII,S$GLB,, | Performed by: INTERNAL MEDICINE

## 2019-04-30 PROCEDURE — 96372 THER/PROPH/DIAG INJ SC/IM: CPT | Mod: S$GLB,,, | Performed by: INTERNAL MEDICINE

## 2019-04-30 PROCEDURE — 99215 PR OFFICE/OUTPT VISIT, EST, LEVL V, 40-54 MIN: ICD-10-PCS | Mod: 25,S$GLB,, | Performed by: INTERNAL MEDICINE

## 2019-04-30 PROCEDURE — 99215 OFFICE O/P EST HI 40 MIN: CPT | Mod: 25,S$GLB,, | Performed by: INTERNAL MEDICINE

## 2019-04-30 RX ORDER — KETOROLAC TROMETHAMINE 10 MG/1
10 TABLET, FILM COATED ORAL 4 TIMES DAILY
Qty: 20 TABLET | Refills: 5 | Status: SHIPPED | OUTPATIENT
Start: 2019-04-30 | End: 2019-05-05

## 2019-04-30 RX ORDER — FLUCONAZOLE 100 MG/1
TABLET ORAL
Refills: 0 | COMMUNITY
Start: 2019-03-08 | End: 2020-05-06 | Stop reason: SDUPTHER

## 2019-04-30 RX ORDER — VANCOMYCIN HYDROCHLORIDE 125 MG/1
CAPSULE ORAL
Refills: 0 | COMMUNITY
Start: 2019-03-22 | End: 2019-04-30 | Stop reason: ALTCHOICE

## 2019-04-30 RX ORDER — ALBUTEROL SULFATE 90 UG/1
AEROSOL, METERED RESPIRATORY (INHALATION)
Refills: 0 | COMMUNITY
Start: 2019-02-02 | End: 2019-04-30 | Stop reason: ALTCHOICE

## 2019-04-30 RX ORDER — CEPHALEXIN 500 MG/1
1000 CAPSULE ORAL EVERY 12 HOURS
Qty: 40 CAPSULE | Refills: 0 | Status: SHIPPED | OUTPATIENT
Start: 2019-04-30 | End: 2019-05-10

## 2019-04-30 RX ORDER — TRAMADOL HYDROCHLORIDE 50 MG/1
50 TABLET ORAL EVERY 8 HOURS PRN
Qty: 90 TABLET | Refills: 1 | Status: SHIPPED | OUTPATIENT
Start: 2019-04-30 | End: 2019-05-30

## 2019-04-30 RX ORDER — METRONIDAZOLE 500 MG/1
TABLET ORAL
Refills: 0 | COMMUNITY
Start: 2019-03-12 | End: 2019-04-30

## 2019-04-30 RX ORDER — MUPIROCIN 20 MG/G
OINTMENT TOPICAL
Refills: 0 | COMMUNITY
Start: 2019-01-30 | End: 2019-04-30

## 2019-04-30 RX ORDER — FLUTICASONE PROPIONATE 0.5 MG/G
1 CREAM TOPICAL 2 TIMES DAILY
Refills: 1 | COMMUNITY
Start: 2019-03-22 | End: 2019-09-11 | Stop reason: SDUPTHER

## 2019-04-30 RX ORDER — ESTRADIOL 2 MG/1
4 TABLET ORAL DAILY
Qty: 60 TABLET | Refills: 5 | Status: SHIPPED | OUTPATIENT
Start: 2019-04-30 | End: 2019-09-11 | Stop reason: SDUPTHER

## 2019-04-30 RX ORDER — HYDROXYCHLOROQUINE SULFATE 200 MG/1
200 TABLET, FILM COATED ORAL 2 TIMES DAILY
Qty: 60 TABLET | Refills: 5 | Status: SHIPPED | OUTPATIENT
Start: 2019-04-30 | End: 2019-09-11 | Stop reason: SDUPTHER

## 2019-04-30 RX ORDER — CYCLOBENZAPRINE HCL 10 MG
TABLET ORAL
Refills: 0 | COMMUNITY
Start: 2019-02-12 | End: 2019-09-11

## 2019-04-30 RX ORDER — HYDROXYZINE PAMOATE 25 MG/1
25 CAPSULE ORAL 4 TIMES DAILY PRN
Refills: 0 | COMMUNITY
Start: 2019-03-22 | End: 2022-01-25

## 2019-04-30 RX ORDER — KETOROLAC TROMETHAMINE 30 MG/ML
60 INJECTION, SOLUTION INTRAMUSCULAR; INTRAVENOUS
Status: COMPLETED | OUTPATIENT
Start: 2019-04-30 | End: 2019-04-30

## 2019-04-30 RX ORDER — PREDNISONE 5 MG/1
TABLET ORAL
Refills: 0 | COMMUNITY
Start: 2019-02-12 | End: 2020-01-07

## 2019-04-30 RX ORDER — CYANOCOBALAMIN 1000 UG/ML
1000 INJECTION, SOLUTION INTRAMUSCULAR; SUBCUTANEOUS
Status: COMPLETED | OUTPATIENT
Start: 2019-04-30 | End: 2019-04-30

## 2019-04-30 RX ORDER — CIPROFLOXACIN 500 MG/1
500 TABLET ORAL EVERY 12 HOURS
Refills: 0 | COMMUNITY
Start: 2019-03-08 | End: 2019-04-30

## 2019-04-30 RX ORDER — CLOTRIMAZOLE AND BETAMETHASONE DIPROPIONATE 10; .64 MG/G; MG/G
CREAM TOPICAL 2 TIMES DAILY
Refills: 1 | COMMUNITY
Start: 2019-03-22 | End: 2019-09-11

## 2019-04-30 RX ADMIN — KETOROLAC TROMETHAMINE 60 MG: 30 INJECTION, SOLUTION INTRAMUSCULAR; INTRAVENOUS at 10:04

## 2019-04-30 RX ADMIN — CYANOCOBALAMIN 1000 MCG: 1000 INJECTION, SOLUTION INTRAMUSCULAR; SUBCUTANEOUS at 10:04

## 2019-04-30 NOTE — PROGRESS NOTES
Subjective:       Patient ID: Crystal Hein is a 53 y.o. female.    Chief Complaint: Arthritis (reactive arthritis ); Pain; and Disease Management (sjogren's syndrome)    F/u: she has  ra and sjogren's, she had Clostridium difficile  From diet and taking prednisone.Patient complains of arthralgias and myalgias for which has been present for a few years. Pain is located in multiple joints, both shoulder(s), both elbow(s), both wrist(s), both MCP(s): 1st, 2nd, 3rd, 4th and 5th, both PIP(s): 1st, 2nd, 3rd, 4th and 5th, both DIP(s): 1st and 2nd, both hip(s), both knee(s) and both MTP(s): 1st, 2nd, 3rd, 4th and 5th, is described as aching, pulsating, shooting and throbbing, and is constant, moderate . Pt tore her L bicep muscle    Review of Systems   Constitutional: Negative for activity change, appetite change and unexpected weight change.   HENT: Negative for dental problem, ear discharge, ear pain, facial swelling, mouth sores, nosebleeds, postnasal drip, rhinorrhea, sinus pressure, sneezing, tinnitus and voice change.    Eyes: Negative for photophobia, pain, discharge, redness and itching.   Respiratory: Negative for apnea, chest tightness, shortness of breath and wheezing.    Cardiovascular: Positive for leg swelling. Negative for palpitations.   Gastrointestinal: Negative for abdominal distention, constipation and diarrhea.   Endocrine: Negative for cold intolerance, heat intolerance, polydipsia and polyuria.   Genitourinary: Negative for decreased urine volume, difficulty urinating, flank pain, frequency, hematuria and urgency.   Musculoskeletal: Positive for back pain, gait problem, joint swelling, myalgias, neck pain and neck stiffness.   Skin: Negative for pallor and wound.   Allergic/Immunologic: Negative for immunocompromised state.   Neurological: Negative for dizziness and tremors.   Hematological: Negative for adenopathy. Does not bruise/bleed easily.   Psychiatric/Behavioral: Negative for sleep  disturbance. The patient is not nervous/anxious.          Objective:     /71 (BP Location: Right arm, Patient Position: Sitting)   Pulse 98   Resp 12   Wt 78.9 kg (174 lb)   BMI 28.96 kg/m²      Physical Exam   Vitals reviewed.  Constitutional: She is oriented to person, place, and time. No distress.   HENT:   Head: Normocephalic and atraumatic.   Eyes: EOM are normal. Pupils are equal, round, and reactive to light. Right eye exhibits no discharge. Left eye exhibits no discharge.   Neck: Neck supple. No thyromegaly present.   Cardiovascular: Normal rate, regular rhythm and normal heart sounds.  Exam reveals no gallop and no friction rub.    No murmur heard.  Pulmonary/Chest: Breath sounds normal. She has no wheezes. She has no rales. She exhibits no tenderness.   Abdominal: There is no tenderness. There is no rebound and no guarding.       Right Side Rheumatological Exam     Examination finds the elbow normal.    The patient is tender to palpation of the shoulder, wrist, knee, 1st PIP, 1st MCP, 2nd PIP, 2nd MCP, 3rd PIP, 3rd MCP, 4th PIP, 4th MCP, 5th PIP and 5th MCP    She has swelling of the 1st PIP, 1st MCP, 2nd PIP, 2nd MCP, 3rd PIP, 3rd MCP, 4th PIP, 4th MCP, 5th PIP and 5th MCP    Shoulder Exam   Tenderness Location: no tenderness    Range of Motion   Active abduction: abnormal   Adduction: abnormal  Sensation: normal    Knee Exam   Patellofemoral Crepitus: positive  Effusion: positive  Sensation: normal    Hip Exam   Tenderness Location: posterior  Sensation: normal    Elbow/Wrist Exam   Tenderness Location: no tenderness  Sensation: normal    Left Side Rheumatological Exam     The patient is tender to palpation of the shoulder, elbow, wrist, knee, 1st PIP, 1st MCP, 2nd PIP, 2nd MCP, 3rd PIP, 3rd MCP, 4th PIP, 4th MCP, 5th PIP and 5th MCP.    She has swelling of the 1st PIP, 1st MCP, 2nd PIP, 2nd MCP, 3rd PIP, 3rd MCP, 4th PIP, 4th MCP, 5th PIP and 5th MCP    Shoulder Exam   Tenderness Location: no  tenderness    Range of Motion   Active abduction: abnormal   Sensation: normal    Knee Exam     Patellofemoral Crepitus: positive  Effusion: positive  Sensation: normal    Hip Exam   Tenderness Location: posterior  Sensation: normal    Elbow/Wrist Exam   Sensation: normal      Back/Neck Exam   General Inspection   Gait: normal         Lymphadenopathy:     She has no cervical adenopathy.   Neurological: She is alert and oriented to person, place, and time. Gait normal.   Skin: Skin is dry. No rash noted. No erythema. There is pallor.     Psychiatric: Mood and affect normal.   Musculoskeletal: She exhibits tenderness and deformity. She exhibits no edema.             Results for orders placed or performed in visit on 10/17/18   Comprehensive metabolic panel   Result Value Ref Range    Glucose 91 65 - 99 mg/dL    BUN, Bld 13 7 - 25 mg/dL    Creatinine 0.76 0.50 - 1.05 mg/dL    eGFR if non  90 > OR = 60 mL/min/1.73m2    eGFR if African American 104 > OR = 60 mL/min/1.73m2    BUN/Creatinine Ratio NOT APPLICABLE 6 - 22 (calc)    Sodium 139 135 - 146 mmol/L    Potassium 4.2 3.5 - 5.3 mmol/L    Chloride 104 98 - 110 mmol/L    CO2 28 20 - 32 mmol/L    Calcium 8.9 8.6 - 10.4 mg/dL    Total Protein 6.3 6.1 - 8.1 g/dL    Albumin 4.0 3.6 - 5.1 g/dL    Globulin, Total 2.3 1.9 - 3.7 g/dL (calc)    Albumin/Globulin Ratio 1.7 1.0 - 2.5 (calc)    Total Bilirubin 0.3 0.2 - 1.2 mg/dL    Alkaline Phosphatase 60 33 - 130 U/L    AST 19 10 - 35 U/L    ALT 18 6 - 29 U/L   Lymphocyte Profile II   Result Value Ref Range    % CD3 (Mature Cells) 83 57 - 85 %    Absolute CD3 + Cells 1,760 840 - 3,060 cells/uL    % CD4 (Glendo Cells) 42 30 - 61 %    Absolute CD4 + Cells 863 490 - 1,740 cells/uL    % CD8 (Suppressor T Cells) 40 12 - 42 %    Absolute CD8+Cells 824 180 - 1,170 cells/uL    Glendo/Suppresor Ratio 1.05 0.86 - 5.00    Cells.CD19/100 cells 10 6 - 29 %    Cells.CD19 222 110 - 660 cells/uL    Absolute Lymphocytes 2,118 850 -  3,900 cells/uL   ACTH   Result Value Ref Range    ACTH 12 6 - 50 pg/mL   Sedimentation rate, automated   Result Value Ref Range    Sed Rate 2 < OR = 30 mm/h   CBC auto differential   Result Value Ref Range    WBC 5.4 3.8 - 10.8 Thousand/uL    RBC 3.93 3.80 - 5.10 Million/uL    Hemoglobin 12.1 11.7 - 15.5 g/dL    Hematocrit 34.8 (L) 35.0 - 45.0 %    Mean Corpuscular Volume 88.5 80.0 - 100.0 fL    Mean Corpuscular Hemoglobin 30.8 27.0 - 33.0 pg    Mean Corpuscular Hemoglobin Conc 34.8 32.0 - 36.0 g/dL    RDW 12.1 11.0 - 15.0 %    Platelets 268 140 - 400 Thousand/uL    MPV 10.9 7.5 - 12.5 fL    Neutrophils Absolute 2,765 1,500 - 7,800 cells/uL    Lymph # 2,014 850 - 3,900 cells/uL    Mono # 416 200 - 950 cells/uL    Eos # 167 15 - 500 cells/uL    Baso # 38 0 - 200 cells/uL    Neutrophils Relative 51.2 %    Lymph% 37.3 %    Mono% 7.7 %    Eosinophil% 3.1 %    Basophil% 0.7 %   SALEEM IFA screen with reflex to titer and pattern   Result Value Ref Range    SALE[facility] POSITIVE (A) NEGATIVE   Antinuclear Antibodies Titer and Pattern   Result Value Ref Range    SALEEM Pattern HOMOGENEOUS (A)     SALEEM Titer 1:80 (H) titer   SJorgen's AB, Anti-SS-A/SS-B   Result Value Ref Range    Anti-SSA Antibody <1.0 NEG <1.0 NEG AI    Anti-SSB Antibody 2.3 POS (A) <1.0 NEG AI   IgA   Result Value Ref Range    IgA 192 81 - 463 mg/dL   IgM   Result Value Ref Range    IgM 52 48 - 271 mg/dL   C-reactive protein   Result Value Ref Range    CRP 3.3 <8.0 mg/L   IgG 1, 2, 3, and 4   Result Value Ref Range    Immunoglobulin G Subclass 1 334 (L) 382 - 929 mg/dL    Immunoglobulin G Subclass 2 251 241 - 700 mg/dL    Immunoglobulin G Subclass 3 80 22 - 178 mg/dL    Immunoglobulin G Sublcass 4 13.8 4 - 86 mg/dL    Immunoglobulin G, Serum 695 694 - 1,618 mg/dL       Assessment:          Encounter Diagnoses   Name Primary?    CVID (common variable immunodeficiency) Yes    Sjogren's syndrome, with unspecified organ involvement     Osteoarthritis of spine with  radiculopathy, thoracolumbar region     Immunodeficiency, common variable     Sinus congestion     Shift work sleep disorder     Reactive arthritis          Plan:     [place] was seen today for arthritis, pain and disease management.    Diagnoses and all orders for this visit:    CVID (common variable immunodeficiency)  -     traMADol (ULTRAM) 50 mg tablet; Take 1 tablet (50 mg total) by mouth every 8 (eight) hours as needed for Pain.  -     ketorolac (TORADOL) 10 mg tablet; Take 1 tablet (10 mg total) by mouth 4 (four) times daily. for 5 days  -     CBC auto differential; Future  -     Comprehensive metabolic panel; Future  -     SALEEM Screen w/Reflex; Future  -     Sjogrens syndrome-A extractable nuclear antibody; Future  -     Sjogrens syndrome-B extractable nuclear antibody; Future  -     Sedimentation rate, automated; Future  -     Rheumatoid factor; Future  -     C-reactive protein; Future  -     Sedimentation rate, automated  -     CBC auto differential  -     Comprehensive metabolic panel  -     SALEEM Screen w/Reflex  -     Sjogrens syndrome-A extractable nuclear antibody  -     Sjogrens syndrome-B extractable nuclear antibody  -     Rheumatoid factor  -     C-reactive protein  -     estradiol (ESTRACE) 2 MG tablet; Take 2 tablets (4 mg total) by mouth once daily.  -     hydroxychloroquine (PLAQUENIL) 200 mg tablet; Take 1 tablet (200 mg total) by mouth 2 (two) times daily.    Sjogren's syndrome, with unspecified organ involvement  -     traMADol (ULTRAM) 50 mg tablet; Take 1 tablet (50 mg total) by mouth every 8 (eight) hours as needed for Pain.  -     ketorolac (TORADOL) 10 mg tablet; Take 1 tablet (10 mg total) by mouth 4 (four) times daily. for 5 days  -     CBC auto differential; Future  -     Comprehensive metabolic panel; Future  -     SALEEM Screen w/Reflex; Future  -     Sjogrens syndrome-A extractable nuclear antibody; Future  -     Sjogrens syndrome-B extractable nuclear antibody; Future  -      Sedimentation rate, automated; Future  -     Rheumatoid factor; Future  -     C-reactive protein; Future  -     Sedimentation rate, automated  -     CBC auto differential  -     Comprehensive metabolic panel  -     SALEEM Screen w/Reflex  -     Sjogrens syndrome-A extractable nuclear antibody  -     Sjogrens syndrome-B extractable nuclear antibody  -     Rheumatoid factor  -     C-reactive protein  -     estradiol (ESTRACE) 2 MG tablet; Take 2 tablets (4 mg total) by mouth once daily.  -     hydroxychloroquine (PLAQUENIL) 200 mg tablet; Take 1 tablet (200 mg total) by mouth 2 (two) times daily.    Osteoarthritis of spine with radiculopathy, thoracolumbar region  -     traMADol (ULTRAM) 50 mg tablet; Take 1 tablet (50 mg total) by mouth every 8 (eight) hours as needed for Pain.  -     ketorolac (TORADOL) 10 mg tablet; Take 1 tablet (10 mg total) by mouth 4 (four) times daily. for 5 days  -     CBC auto differential; Future  -     Comprehensive metabolic panel; Future  -     SALEEM Screen w/Reflex; Future  -     Sjogrens syndrome-A extractable nuclear antibody; Future  -     Sjogrens syndrome-B extractable nuclear antibody; Future  -     Sedimentation rate, automated; Future  -     Rheumatoid factor; Future  -     C-reactive protein; Future  -     Sedimentation rate, automated  -     CBC auto differential  -     Comprehensive metabolic panel  -     SALEEM Screen w/Reflex  -     Sjogrens syndrome-A extractable nuclear antibody  -     Sjogrens syndrome-B extractable nuclear antibody  -     Rheumatoid factor  -     C-reactive protein  -     estradiol (ESTRACE) 2 MG tablet; Take 2 tablets (4 mg total) by mouth once daily.    Immunodeficiency, common variable  -     traMADol (ULTRAM) 50 mg tablet; Take 1 tablet (50 mg total) by mouth every 8 (eight) hours as needed for Pain.  -     ketorolac (TORADOL) 10 mg tablet; Take 1 tablet (10 mg total) by mouth 4 (four) times daily. for 5 days  -     CBC auto differential; Future  -      Comprehensive metabolic panel; Future  -     SALEEM Screen w/Reflex; Future  -     Sjogrens syndrome-A extractable nuclear antibody; Future  -     Sjogrens syndrome-B extractable nuclear antibody; Future  -     Sedimentation rate, automated; Future  -     Rheumatoid factor; Future  -     C-reactive protein; Future  -     Sedimentation rate, automated  -     CBC auto differential  -     Comprehensive metabolic panel  -     SALEEM Screen w/Reflex  -     Sjogrens syndrome-A extractable nuclear antibody  -     Sjogrens syndrome-B extractable nuclear antibody  -     Rheumatoid factor  -     C-reactive protein    Sinus congestion  -     cephALEXin (KEFLEX) 500 MG capsule; Take 2 capsules (1,000 mg total) by mouth every 12 (twelve) hours. for 10 days  -     CBC auto differential; Future  -     Comprehensive metabolic panel; Future  -     SALEEM Screen w/Reflex; Future  -     Sjogrens syndrome-A extractable nuclear antibody; Future  -     Sjogrens syndrome-B extractable nuclear antibody; Future  -     Sedimentation rate, automated; Future  -     Rheumatoid factor; Future  -     C-reactive protein; Future  -     Sedimentation rate, automated  -     CBC auto differential  -     Comprehensive metabolic panel  -     SALEEM Screen w/Reflex  -     Sjogrens syndrome-A extractable nuclear antibody  -     Sjogrens syndrome-B extractable nuclear antibody  -     Rheumatoid factor  -     C-reactive protein    Shift work sleep disorder  -     estradiol (ESTRACE) 2 MG tablet; Take 2 tablets (4 mg total) by mouth once daily.    Reactive arthritis  -     estradiol (ESTRACE) 2 MG tablet; Take 2 tablets (4 mg total) by mouth once daily.  -     hydroxychloroquine (PLAQUENIL) 200 mg tablet; Take 1 tablet (200 mg total) by mouth 2 (two) times daily.    Other orders  -     ketorolac injection 60 mg  -     cyanocobalamin injection 1,000 mcg

## 2019-07-26 ENCOUNTER — TELEPHONE (OUTPATIENT)
Dept: RHEUMATOLOGY | Facility: CLINIC | Age: 54
End: 2019-07-26

## 2019-07-26 RX ORDER — VALACYCLOVIR HYDROCHLORIDE 1 G/1
1000 TABLET, FILM COATED ORAL 2 TIMES DAILY
Qty: 60 TABLET | Refills: 11 | Status: SHIPPED | OUTPATIENT
Start: 2019-07-26 | End: 2022-01-25 | Stop reason: SDUPTHER

## 2019-09-11 ENCOUNTER — OFFICE VISIT (OUTPATIENT)
Dept: RHEUMATOLOGY | Facility: CLINIC | Age: 54
End: 2019-09-11
Payer: COMMERCIAL

## 2019-09-11 VITALS
BODY MASS INDEX: 26.66 KG/M2 | HEART RATE: 84 BPM | DIASTOLIC BLOOD PRESSURE: 78 MMHG | HEIGHT: 65 IN | WEIGHT: 160 LBS | SYSTOLIC BLOOD PRESSURE: 119 MMHG

## 2019-09-11 DIAGNOSIS — N39.46 MIXED STRESS AND URGE URINARY INCONTINENCE: Primary | ICD-10-CM

## 2019-09-11 DIAGNOSIS — M99.01 CERVICAL (NECK) REGION SOMATIC DYSFUNCTION: ICD-10-CM

## 2019-09-11 DIAGNOSIS — M47.25 OSTEOARTHRITIS OF SPINE WITH RADICULOPATHY, THORACOLUMBAR REGION: ICD-10-CM

## 2019-09-11 DIAGNOSIS — K21.9 GASTROESOPHAGEAL REFLUX DISEASE, ESOPHAGITIS PRESENCE NOT SPECIFIED: ICD-10-CM

## 2019-09-11 DIAGNOSIS — G47.26 SHIFT WORK SLEEP DISORDER: ICD-10-CM

## 2019-09-11 DIAGNOSIS — M35.00 SJOGREN'S SYNDROME, WITH UNSPECIFIED ORGAN INVOLVEMENT: ICD-10-CM

## 2019-09-11 DIAGNOSIS — D83.9 CVID (COMMON VARIABLE IMMUNODEFICIENCY): ICD-10-CM

## 2019-09-11 DIAGNOSIS — M02.30 REACTIVE ARTHRITIS: ICD-10-CM

## 2019-09-11 DIAGNOSIS — R63.5 WEIGHT GAIN: ICD-10-CM

## 2019-09-11 PROCEDURE — 3008F BODY MASS INDEX DOCD: CPT | Mod: CPTII,S$GLB,, | Performed by: INTERNAL MEDICINE

## 2019-09-11 PROCEDURE — 3008F PR BODY MASS INDEX (BMI) DOCUMENTED: ICD-10-PCS | Mod: CPTII,S$GLB,, | Performed by: INTERNAL MEDICINE

## 2019-09-11 PROCEDURE — 99999 PR PBB SHADOW E&M-EST. PATIENT-LVL III: CPT | Mod: PBBFAC,,, | Performed by: INTERNAL MEDICINE

## 2019-09-11 PROCEDURE — 99214 PR OFFICE/OUTPT VISIT, EST, LEVL IV, 30-39 MIN: ICD-10-PCS | Mod: S$GLB,,, | Performed by: INTERNAL MEDICINE

## 2019-09-11 PROCEDURE — 99999 PR PBB SHADOW E&M-EST. PATIENT-LVL III: ICD-10-PCS | Mod: PBBFAC,,, | Performed by: INTERNAL MEDICINE

## 2019-09-11 PROCEDURE — 99214 OFFICE O/P EST MOD 30 MIN: CPT | Mod: S$GLB,,, | Performed by: INTERNAL MEDICINE

## 2019-09-11 RX ORDER — FLUTICASONE PROPIONATE 0.5 MG/G
1 CREAM TOPICAL 2 TIMES DAILY
Qty: 60 G | Refills: 1 | Status: SHIPPED | OUTPATIENT
Start: 2019-09-11 | End: 2023-07-17

## 2019-09-11 RX ORDER — ALPRAZOLAM 0.25 MG/1
TABLET ORAL
Refills: 0 | COMMUNITY
Start: 2019-08-19 | End: 2019-09-11

## 2019-09-11 RX ORDER — HYDROXYCHLOROQUINE SULFATE 200 MG/1
200 TABLET, FILM COATED ORAL 2 TIMES DAILY
Qty: 60 TABLET | Refills: 5 | Status: SHIPPED | OUTPATIENT
Start: 2019-09-11 | End: 2020-05-06 | Stop reason: SDUPTHER

## 2019-09-11 RX ORDER — IBUPROFEN AND FAMOTIDINE 26.6; 8 MG/1; MG/1
1 TABLET ORAL 3 TIMES DAILY
Qty: 90 TABLET | Refills: 6 | Status: SHIPPED | OUTPATIENT
Start: 2019-09-11 | End: 2020-01-07 | Stop reason: SDUPTHER

## 2019-09-11 RX ORDER — FUROSEMIDE 40 MG/1
40 TABLET ORAL DAILY
Qty: 30 TABLET | Refills: 6 | Status: SHIPPED | OUTPATIENT
Start: 2019-09-11 | End: 2020-09-10

## 2019-09-11 RX ORDER — ESTRADIOL 2 MG/1
4 TABLET ORAL DAILY
Qty: 60 TABLET | Refills: 5 | Status: SHIPPED | OUTPATIENT
Start: 2019-09-11 | End: 2020-05-06 | Stop reason: SDUPTHER

## 2019-09-11 RX ORDER — DICLOFENAC SODIUM 10 MG/G
2 GEL TOPICAL 2 TIMES DAILY
COMMUNITY
End: 2019-09-11 | Stop reason: SDUPTHER

## 2019-09-11 RX ORDER — DICLOFENAC SODIUM 10 MG/G
2 GEL TOPICAL 2 TIMES DAILY
Qty: 100 G | Refills: 5 | Status: SHIPPED | OUTPATIENT
Start: 2019-09-11 | End: 2022-01-25

## 2019-09-11 ASSESSMENT — ROUTINE ASSESSMENT OF PATIENT INDEX DATA (RAPID3)
TOTAL RAPID3 SCORE: 3.44
MDHAQ FUNCTION SCORE: .7
PSYCHOLOGICAL DISTRESS SCORE: 0
PATIENT GLOBAL ASSESSMENT SCORE: 3
PAIN SCORE: 5

## 2019-09-11 NOTE — PROGRESS NOTES
Subjective:       Patient ID: Crystal Hein is a 53 y.o. female.    Chief Complaint: Disease Management (CVID); Osteoarthritis (of back); and Arthritis (reactive arthritis )    F/u: she has  ra and sjogren's, on optiva and lost  20 lbs.  Pain is located in multiple joints, both shoulder(s), both elbow(s), both wrist(s), both MCP(s): 1st, 2nd, 3rd, 4th and 5th, both PIP(s): 1st, 2nd, 3rd, 4th and 5th, both DIP(s): 1st and 2nd, both hip(s), both knee(s) and both MTP(s): 1st, 2nd, 3rd, 4th and 5th, is described as aching, pulsating, shooting and throbbing, and is constant, moderate .            She complains of joint swelling. Pertinent negatives include no myalgias.     Her past medical history is significant for osteoarthritis.   Osteoarthritis   Associated symptoms include joint swelling and neck pain. Pertinent negatives include no myalgias.   Arthritis   Associated symptoms include joint swelling and neck pain. Pertinent negatives include no myalgias.     Review of Systems   Constitutional: Negative for activity change, appetite change and unexpected weight change.   HENT: Negative for dental problem, ear discharge, ear pain, facial swelling, mouth sores, nosebleeds, postnasal drip, rhinorrhea, sinus pressure, sneezing, tinnitus and voice change.    Eyes: Negative for photophobia, pain, discharge, redness and itching.   Respiratory: Negative for apnea, chest tightness, shortness of breath and wheezing.    Cardiovascular: Positive for leg swelling. Negative for palpitations.   Gastrointestinal: Negative for abdominal distention, constipation and diarrhea.   Endocrine: Negative for cold intolerance, heat intolerance, polydipsia and polyuria.   Genitourinary: Negative for decreased urine volume, difficulty urinating, flank pain, frequency, hematuria and urgency.   Musculoskeletal: Positive for arthritis, back pain, joint swelling, neck pain and neck stiffness. Negative for gait problem and myalgias.   Skin:  "Negative for pallor and wound.   Allergic/Immunologic: Negative for immunocompromised state.   Neurological: Negative for dizziness and tremors.   Hematological: Negative for adenopathy. Does not bruise/bleed easily.   Psychiatric/Behavioral: Negative for sleep disturbance. The patient is not nervous/anxious.          Objective:     /78   Pulse 84   Ht 5' 5" (1.651 m)   Wt 72.6 kg (160 lb)   BMI 26.63 kg/m²      Physical Exam   Vitals reviewed.  Constitutional: She is oriented to person, place, and time. No distress.   HENT:   Head: Normocephalic and atraumatic.   Eyes: EOM are normal. Pupils are equal, round, and reactive to light. Right eye exhibits no discharge. Left eye exhibits no discharge.   Neck: Neck supple. No thyromegaly present.   Cardiovascular: Normal rate, regular rhythm and normal heart sounds.  Exam reveals no gallop and no friction rub.    No murmur heard.  Pulmonary/Chest: Breath sounds normal. She has no wheezes. She has no rales. She exhibits no tenderness.   Abdominal: There is no tenderness. There is no rebound and no guarding.       Right Side Rheumatological Exam     Examination finds the elbow normal.    The patient is tender to palpation of the shoulder, wrist, knee, 1st PIP, 1st MCP, 2nd PIP, 2nd MCP, 3rd PIP, 3rd MCP, 4th PIP, 4th MCP, 5th PIP and 5th MCP    She has swelling of the 1st PIP, 1st MCP, 2nd PIP, 2nd MCP, 3rd PIP, 3rd MCP, 4th PIP, 4th MCP, 5th PIP and 5th MCP    Shoulder Exam   Tenderness Location: no tenderness    Range of Motion   Active abduction: abnormal   Adduction: abnormal  Sensation: normal    Knee Exam   Patellofemoral Crepitus: positive  Effusion: positive  Sensation: normal    Hip Exam   Tenderness Location: posterior  Sensation: normal    Elbow/Wrist Exam   Tenderness Location: no tenderness  Sensation: normal    Left Side Rheumatological Exam     The patient is tender to palpation of the shoulder, elbow, wrist, knee, 1st PIP, 1st MCP, 2nd PIP, 2nd " MCP, 3rd PIP, 3rd MCP, 4th PIP, 4th MCP, 5th PIP and 5th MCP.    She has swelling of the 1st PIP, 1st MCP, 2nd PIP, 2nd MCP, 3rd PIP, 3rd MCP, 4th PIP, 4th MCP, 5th PIP and 5th MCP    Shoulder Exam   Tenderness Location: no tenderness    Range of Motion   Active abduction: abnormal   Sensation: normal    Knee Exam     Patellofemoral Crepitus: positive  Effusion: positive  Sensation: normal    Hip Exam   Tenderness Location: posterior  Sensation: normal    Elbow/Wrist Exam   Sensation: normal      Back/Neck Exam   General Inspection   Gait: normal         Lymphadenopathy:     She has no cervical adenopathy.   Neurological: She is alert and oriented to person, place, and time. Gait normal.   Skin: Skin is dry. No rash noted. No erythema. There is pallor.     Psychiatric: Mood and affect normal.   Musculoskeletal: She exhibits tenderness and deformity. She exhibits no edema.             Results for orders placed or performed in visit on 10/17/18   Comprehensive metabolic panel   Result Value Ref Range    Glucose 91 65 - 99 mg/dL    BUN, Bld 13 7 - 25 mg/dL    Creatinine 0.76 0.50 - 1.05 mg/dL    eGFR if non  90 > OR = 60 mL/min/1.73m2    eGFR if African American 104 > OR = 60 mL/min/1.73m2    BUN/Creatinine Ratio NOT APPLICABLE 6 - 22 (calc)    Sodium 139 135 - 146 mmol/L    Potassium 4.2 3.5 - 5.3 mmol/L    Chloride 104 98 - 110 mmol/L    CO2 28 20 - 32 mmol/L    Calcium 8.9 8.6 - 10.4 mg/dL    Total Protein 6.3 6.1 - 8.1 g/dL    Albumin 4.0 3.6 - 5.1 g/dL    Globulin, Total 2.3 1.9 - 3.7 g/dL (calc)    Albumin/Globulin Ratio 1.7 1.0 - 2.5 (calc)    Total Bilirubin 0.3 0.2 - 1.2 mg/dL    Alkaline Phosphatase 60 33 - 130 U/L    AST 19 10 - 35 U/L    ALT 18 6 - 29 U/L   Lymphocyte Profile II   Result Value Ref Range    % CD3 (Mature Cells) 83 57 - 85 %    Absolute CD3 + Cells 1,760 840 - 3,060 cells/uL    % CD4 ([place] Cells) 42 30 - 61 %    Absolute CD4 + Cells 863 490 - 1,740 cells/uL    % CD8  (Suppressor T Cells) 40 12 - 42 %    Absolute CD8+Cells 824 180 - 1,170 cells/uL    [place]inn/Suppresor Ratio 1.05 0.86 - 5.00    Cells.CD19/100 cells 10 6 - 29 %    Cells.CD19 222 110 - 660 cells/uL    Absolute Lymphocytes 2,118 850 - 3,900 cells/uL   ACTH   Result Value Ref Range    ACTH 12 6 - 50 pg/mL   Sedimentation rate, automated   Result Value Ref Range    Sed Rate 2 < OR = 30 mm/h   CBC auto differential   Result Value Ref Range    WBC 5.4 3.8 - 10.8 Thousand/uL    RBC 3.93 3.80 - 5.10 Million/uL    Hemoglobin 12.1 11.7 - 15.5 g/dL    Hematocrit 34.8 (L) 35.0 - 45.0 %    Mean Corpuscular Volume 88.5 80.0 - 100.0 fL    Mean Corpuscular Hemoglobin 30.8 27.0 - 33.0 pg    Mean Corpuscular Hemoglobin Conc 34.8 32.0 - 36.0 g/dL    RDW 12.1 11.0 - 15.0 %    Platelets 268 140 - 400 Thousand/uL    MPV 10.9 7.5 - 12.5 fL    Neutrophils Absolute 2,765 1,500 - 7,800 cells/uL    Lymph # 2,014 850 - 3,900 cells/uL    Mono # 416 200 - 950 cells/uL    Eos # 167 15 - 500 cells/uL    Baso # 38 0 - 200 cells/uL    Neutrophils Relative 51.2 %    Lymph% 37.3 %    Mono% 7.7 %    Eosinophil% 3.1 %    Basophil% 0.7 %   SALEEM IFA screen with reflex to titer and pattern   Result Value Ref Range    S[facility] POSITIVE (A) NEGATIVE   Antinuclear Antibodies Titer and Pattern   Result Value Ref Range    SALE[facility] Pattern HOMOGENEOUS (A)     SALEEM Titer 1:80 (H) titer   SJorgen's AB, Anti-SS-A/SS-B   Result Value Ref Range    Anti-SSA Antibody <1.0 NEG <1.0 NEG AI    Anti-SSB Antibody 2.3 POS (A) <1.0 NEG AI   IgA   Result Value Ref Range    IgA 192 81 - 463 mg/dL   IgM   Result Value Ref Range    IgM 52 48 - 271 mg/dL   C-reactive protein   Result Value Ref Range    CRP 3.3 <8.0 mg/L   IgG 1, 2, 3, and 4   Result Value Ref Range    Immunoglobulin G Subclass 1 334 (L) 382 - 929 mg/dL    Immunoglobulin G Subclass 2 251 241 - 700 mg/dL    Immunoglobulin G Subclass 3 80 22 - 178 mg/dL    Immunoglobulin G Sublcass 4 13.8 4 - 86 mg/dL    Immunoglobulin G, Serum  695 694 - 1,618 mg/dL       Assessment:          Encounter Diagnoses   Name Primary?    Mixed stress and urge urinary incontinence Yes    Sjogren's syndrome, with unspecified organ involvement     Osteoarthritis of spine with radiculopathy, thoracolumbar region     Shift work sleep disorder     Reactive arthritis     CVID (common variable immunodeficiency)     Cervical (neck) region somatic dysfunction     Weight gain     Gastroesophageal reflux disease, esophagitis presence not specified          Plan:     [place] was seen today for disease management, osteoarthritis and arthritis.    Diagnoses and all orders for this visit:    Mixed stress and urge urinary incontinence  -     mirabegron (MYRBETRIQ) 50 mg Tb24; Take 1 tablet (50 mg total) by mouth once daily.    Sjogren's syndrome, with unspecified organ involvement  -     diclofenac sodium (VOLTAREN) 1 % Gel; Apply 2 g topically 2 (two) times daily.  -     estradiol (ESTRACE) 2 MG tablet; Take 2 tablets (4 mg total) by mouth once daily.  -     fluticasone propionate (CUTIVATE) 0.05 % cream; Apply 1 application topically 2 (two) times daily.  -     hydroxychloroquine (PLAQUENIL) 200 mg tablet; Take 1 tablet (200 mg total) by mouth 2 (two) times daily.  -     SALEEM Screen w/Reflex; Future  -     Sjogrens syndrome-A extractable nuclear antibody; Future  -     Sjogrens syndrome-B extractable nuclear antibody; Future  -     Sedimentation rate, automated; Future  -     C-reactive protein; Future  -     IgG 1, 2, 3, and 4; Future  -     IgG; Future  -     IgM; Future  -     IgA; Future  -     CBC auto differential; Future  -     Comprehensive metabolic panel; Future  -     SALEEM Screen w/Reflex  -     Sjogrens syndrome-A extractable nuclear antibody  -     Sjogrens syndrome-B extractable nuclear antibody  -     Sedimentation rate, automated  -     C-reactive protein  -     IgG 1, 2, 3, and 4  -     IgG  -     IgM  -     IgA  -     CBC auto differential  -      Comprehensive metabolic panel  -     ibuprofen-famotidine (DUEXIS) 800-26.6 mg Tab; Take 1 tablet by mouth 3 (three) times daily.  -     furosemide (LASIX) 40 MG tablet; Take 1 tablet (40 mg total) by mouth once daily.    Osteoarthritis of spine with radiculopathy, thoracolumbar region  -     diclofenac sodium (VOLTAREN) 1 % Gel; Apply 2 g topically 2 (two) times daily.  -     estradiol (ESTRACE) 2 MG tablet; Take 2 tablets (4 mg total) by mouth once daily.  -     fluticasone propionate (CUTIVATE) 0.05 % cream; Apply 1 application topically 2 (two) times daily.  -     hydroxychloroquine (PLAQUENIL) 200 mg tablet; Take 1 tablet (200 mg total) by mouth 2 (two) times daily.  -     SALEEM Screen w/Reflex; Future  -     Sjogrens syndrome-A extractable nuclear antibody; Future  -     Sjogrens syndrome-B extractable nuclear antibody; Future  -     Sedimentation rate, automated; Future  -     C-reactive protein; Future  -     IgG 1, 2, 3, and 4; Future  -     IgG; Future  -     IgM; Future  -     IgA; Future  -     CBC auto differential; Future  -     Comprehensive metabolic panel; Future  -     SALEEM Screen w/Reflex  -     Sjogrens syndrome-A extractable nuclear antibody  -     Sjogrens syndrome-B extractable nuclear antibody  -     Sedimentation rate, automated  -     C-reactive protein  -     IgG 1, 2, 3, and 4  -     IgG  -     IgM  -     IgA  -     CBC auto differential  -     Comprehensive metabolic panel    Shift work sleep disorder  -     diclofenac sodium (VOLTAREN) 1 % Gel; Apply 2 g topically 2 (two) times daily.  -     estradiol (ESTRACE) 2 MG tablet; Take 2 tablets (4 mg total) by mouth once daily.  -     fluticasone propionate (CUTIVATE) 0.05 % cream; Apply 1 application topically 2 (two) times daily.  -     hydroxychloroquine (PLAQUENIL) 200 mg tablet; Take 1 tablet (200 mg total) by mouth 2 (two) times daily.  -     SALE[facility] Screen w/Reflex; Future  -     Sjogrens syndrome-A extractable nuclear antibody; Future  -      Sjogrens syndrome-B extractable nuclear antibody; Future  -     Sedimentation rate, automated; Future  -     C-reactive protein; Future  -     IgG 1, 2, 3, and 4; Future  -     IgG; Future  -     IgM; Future  -     IgA; Future  -     CBC auto differential; Future  -     Comprehensive metabolic panel; Future  -     SALEEM Screen w/Reflex  -     Sjogrens syndrome-A extractable nuclear antibody  -     Sjogrens syndrome-B extractable nuclear antibody  -     Sedimentation rate, automated  -     C-reactive protein  -     IgG 1, 2, 3, and 4  -     IgG  -     IgM  -     IgA  -     CBC auto differential  -     Comprehensive metabolic panel    Reactive arthritis  -     diclofenac sodium (VOLTAREN) 1 % Gel; Apply 2 g topically 2 (two) times daily.  -     estradiol (ESTRACE) 2 MG tablet; Take 2 tablets (4 mg total) by mouth once daily.  -     fluticasone propionate (CUTIVATE) 0.05 % cream; Apply 1 application topically 2 (two) times daily.  -     hydroxychloroquine (PLAQUENIL) 200 mg tablet; Take 1 tablet (200 mg total) by mouth 2 (two) times daily.  -     SALE[facility] Screen w/Reflex; Future  -     Sjogrens syndrome-A extractable nuclear antibody; Future  -     Sjogrens syndrome-B extractable nuclear antibody; Future  -     Sedimentation rate, automated; Future  -     C-reactive protein; Future  -     IgG 1, 2, 3, and 4; Future  -     IgG; Future  -     IgM; Future  -     IgA; Future  -     CBC auto differential; Future  -     Comprehensive metabolic panel; Future  -     SALEEM Screen w/Reflex  -     Sjogrens syndrome-A extractable nuclear antibody  -     Sjogrens syndrome-B extractable nuclear antibody  -     Sedimentation rate, automated  -     C-reactive protein  -     IgG 1, 2, 3, and 4  -     IgG  -     IgM  -     IgA  -     CBC auto differential  -     Comprehensive metabolic panel    CVID (common variable immunodeficiency)  -     diclofenac sodium (VOLTAREN) 1 % Gel; Apply 2 g topically 2 (two) times daily.  -     estradiol (ESTRACE)  2 MG tablet; Take 2 tablets (4 mg total) by mouth once daily.  -     fluticasone propionate (CUTIVATE) 0.05 % cream; Apply 1 application topically 2 (two) times daily.  -     hydroxychloroquine (PLAQUENIL) 200 mg tablet; Take 1 tablet (200 mg total) by mouth 2 (two) times daily.  -     SALE[facility] Screen w/Reflex; Future  -     Sjogrens syndrome-A extractable nuclear antibody; Future  -     Sjogrens syndrome-B extractable nuclear antibody; Future  -     Sedimentation rate, automated; Future  -     C-reactive protein; Future  -     IgG 1, 2, 3, and 4; Future  -     IgG; Future  -     IgM; Future  -     IgA; Future  -     CBC auto differential; Future  -     Comprehensive metabolic panel; Future  -     SALEEM Screen w/Reflex  -     Sjogrens syndrome-A extractable nuclear antibody  -     Sjogrens syndrome-B extractable nuclear antibody  -     Sedimentation rate, automated  -     C-reactive protein  -     IgG 1, 2, 3, and 4  -     IgG  -     IgM  -     IgA  -     CBC auto differential  -     Comprehensive metabolic panel  -     ibuprofen-famotidine (DUEXIS) 800-26.6 mg Tab; Take 1 tablet by mouth 3 (three) times daily.  -     furosemide (LASIX) 40 MG tablet; Take 1 tablet (40 mg total) by mouth once daily.    Cervical (neck) region somatic dysfunction  -     ibuprofen-famotidine (DUEXIS) 800-26.6 mg Tab; Take 1 tablet by mouth 3 (three) times daily.  -     furosemide (LASIX) 40 MG tablet; Take 1 tablet (40 mg total) by mouth once daily.    Weight gain  -     ibuprofen-famotidine (DUEXIS) 800-26.6 mg Tab; Take 1 tablet by mouth 3 (three) times daily.    Gastroesophageal reflux disease, esophagitis presence not specified  -     ibuprofen-famotidine (DUEXIS) 800-26.6 mg Tab; Take 1 tablet by mouth 3 (three) times daily.     she may have stem cell  She will hold  Plaquenil.

## 2019-10-07 ENCOUNTER — TELEPHONE (OUTPATIENT)
Dept: RHEUMATOLOGY | Facility: CLINIC | Age: 54
End: 2019-10-07

## 2019-10-07 RX ORDER — PHENOBARBITAL, HYOSCYAMINE SULFATE, ATROPINE SULFATE AND SCOPOLAMINE HYDROBROMIDE .0194; .1037; 16.2; .0065 MG/1; MG/1; MG/1; MG/1
1 TABLET ORAL 3 TIMES DAILY PRN
Qty: 90 EACH | Refills: 0 | Status: SHIPPED | OUTPATIENT
Start: 2019-10-07 | End: 2022-10-03

## 2019-10-10 ENCOUNTER — PATIENT MESSAGE (OUTPATIENT)
Dept: RHEUMATOLOGY | Facility: CLINIC | Age: 54
End: 2019-10-10

## 2019-10-10 ENCOUNTER — DOCUMENTATION ONLY (OUTPATIENT)
Dept: RHEUMATOLOGY | Facility: CLINIC | Age: 54
End: 2019-10-10

## 2020-01-07 ENCOUNTER — OFFICE VISIT (OUTPATIENT)
Dept: RHEUMATOLOGY | Facility: CLINIC | Age: 55
End: 2020-01-07
Payer: COMMERCIAL

## 2020-01-07 VITALS
BODY MASS INDEX: 27.82 KG/M2 | HEIGHT: 65 IN | HEART RATE: 88 BPM | WEIGHT: 167 LBS | DIASTOLIC BLOOD PRESSURE: 78 MMHG | SYSTOLIC BLOOD PRESSURE: 134 MMHG

## 2020-01-07 DIAGNOSIS — M47.25 OSTEOARTHRITIS OF SPINE WITH RADICULOPATHY, THORACOLUMBAR REGION: ICD-10-CM

## 2020-01-07 DIAGNOSIS — D83.9 CVID (COMMON VARIABLE IMMUNODEFICIENCY): ICD-10-CM

## 2020-01-07 DIAGNOSIS — M35.00 SJOGREN'S SYNDROME, WITH UNSPECIFIED ORGAN INVOLVEMENT: Primary | ICD-10-CM

## 2020-01-07 DIAGNOSIS — R63.5 WEIGHT GAIN: ICD-10-CM

## 2020-01-07 DIAGNOSIS — K21.9 GASTROESOPHAGEAL REFLUX DISEASE, ESOPHAGITIS PRESENCE NOT SPECIFIED: ICD-10-CM

## 2020-01-07 DIAGNOSIS — K58.9 IRRITABLE BOWEL SYNDROME, UNSPECIFIED TYPE: ICD-10-CM

## 2020-01-07 DIAGNOSIS — M99.01 CERVICAL (NECK) REGION SOMATIC DYSFUNCTION: ICD-10-CM

## 2020-01-07 PROCEDURE — 99999 PR PBB SHADOW E&M-EST. PATIENT-LVL III: CPT | Mod: PBBFAC,,, | Performed by: INTERNAL MEDICINE

## 2020-01-07 PROCEDURE — 96372 THER/PROPH/DIAG INJ SC/IM: CPT | Mod: S$GLB,,, | Performed by: INTERNAL MEDICINE

## 2020-01-07 PROCEDURE — 3008F BODY MASS INDEX DOCD: CPT | Mod: CPTII,S$GLB,, | Performed by: INTERNAL MEDICINE

## 2020-01-07 PROCEDURE — 96372 PR INJECTION,THERAP/PROPH/DIAG2ST, IM OR SUBCUT: ICD-10-PCS | Mod: S$GLB,,, | Performed by: INTERNAL MEDICINE

## 2020-01-07 PROCEDURE — 99999 PR PBB SHADOW E&M-EST. PATIENT-LVL III: ICD-10-PCS | Mod: PBBFAC,,, | Performed by: INTERNAL MEDICINE

## 2020-01-07 PROCEDURE — 99214 OFFICE O/P EST MOD 30 MIN: CPT | Mod: 25,S$GLB,, | Performed by: INTERNAL MEDICINE

## 2020-01-07 PROCEDURE — 3008F PR BODY MASS INDEX (BMI) DOCUMENTED: ICD-10-PCS | Mod: CPTII,S$GLB,, | Performed by: INTERNAL MEDICINE

## 2020-01-07 PROCEDURE — 99214 PR OFFICE/OUTPT VISIT, EST, LEVL IV, 30-39 MIN: ICD-10-PCS | Mod: 25,S$GLB,, | Performed by: INTERNAL MEDICINE

## 2020-01-07 RX ORDER — IBUPROFEN AND FAMOTIDINE 26.6; 8 MG/1; MG/1
1 TABLET ORAL 3 TIMES DAILY
Qty: 90 TABLET | Refills: 3 | Status: SHIPPED | OUTPATIENT
Start: 2020-01-07 | End: 2020-02-06

## 2020-01-07 RX ORDER — CYANOCOBALAMIN 1000 UG/ML
1000 INJECTION, SOLUTION INTRAMUSCULAR; SUBCUTANEOUS
Status: COMPLETED | OUTPATIENT
Start: 2020-01-07 | End: 2020-01-07

## 2020-01-07 RX ORDER — OFLOXACIN 3 MG/ML
SOLUTION AURICULAR (OTIC)
COMMUNITY
End: 2023-07-17

## 2020-01-07 RX ORDER — CEFTRIAXONE 1 G/1
1 INJECTION, POWDER, FOR SOLUTION INTRAMUSCULAR; INTRAVENOUS
Status: COMPLETED | OUTPATIENT
Start: 2020-01-07 | End: 2020-01-07

## 2020-01-07 RX ADMIN — CEFTRIAXONE 1 G: 1 INJECTION, POWDER, FOR SOLUTION INTRAMUSCULAR; INTRAVENOUS at 12:01

## 2020-01-07 RX ADMIN — CYANOCOBALAMIN 1000 MCG: 1000 INJECTION, SOLUTION INTRAMUSCULAR; SUBCUTANEOUS at 12:01

## 2020-01-07 ASSESSMENT — ROUTINE ASSESSMENT OF PATIENT INDEX DATA (RAPID3)
MDHAQ FUNCTION SCORE: .5
PATIENT GLOBAL ASSESSMENT SCORE: 4.5
FATIGUE SCORE: 1.1
PAIN SCORE: 4
TOTAL RAPID3 SCORE: 3.39
PSYCHOLOGICAL DISTRESS SCORE: 0

## 2020-01-07 NOTE — PROGRESS NOTES
Administered 1 gram of rocephin mixed with 2.1 ml of lidocaine. Given in the left upper outer gluteal. Informed of s/s to report verbalized understanding. No adverse reactions noted.    Lot # [id number]  Expiration mar 2022    Administered 1 cc ( 1000 mcg/ml ) of b12 to the right upper outer gluteal. Informed of s/s to report verbalized understanding. No adverse reactions noted.    Lot # [id number]  Expiration apr 21

## 2020-01-07 NOTE — PROGRESS NOTES
"Subjective:       Patient ID: Crystal Hein is a 54 y.o. female.    Chief Complaint: Disease Management    F/u: she has  ra and sjogren's, on optiva off  Diet, scheduled to get stem cell injections.  Pain is located in multiple joints, both shoulder(s), both elbow(s), both wrist(s), both MCP(s): 1st, 2nd, 3rd, 4th and 5th, both PIP(s): 1st, 2nd, 3rd, 4th and 5th, both DIP(s): 1st and 2nd, both hip(s), both knee(s) and both MTP(s): 1st, 2nd, 3rd, 4th and 5th, is described as aching, pulsating, shooting and throbbing, and is constant, moderate .    Review of Systems   Constitutional: Negative for activity change, appetite change and unexpected weight change.   HENT: Negative for dental problem, ear discharge, ear pain, facial swelling, mouth sores, nosebleeds, postnasal drip, rhinorrhea, sinus pressure, sneezing, tinnitus and voice change.    Eyes: Negative for photophobia, pain, discharge, redness and itching.   Respiratory: Negative for apnea, chest tightness, shortness of breath and wheezing.    Cardiovascular: Positive for leg swelling. Negative for palpitations.   Gastrointestinal: Negative for abdominal distention, constipation and diarrhea.   Endocrine: Negative for cold intolerance, heat intolerance, polydipsia and polyuria.   Genitourinary: Negative for decreased urine volume, difficulty urinating, flank pain, frequency, hematuria and urgency.   Musculoskeletal: Positive for back pain, joint swelling and neck stiffness. Negative for gait problem.   Skin: Negative for pallor and wound.   Allergic/Immunologic: Negative for immunocompromised state.   Neurological: Negative for dizziness and tremors.   Hematological: Negative for adenopathy. Does not bruise/bleed easily.   Psychiatric/Behavioral: Negative for sleep disturbance. The patient is not nervous/anxious.          Objective:     /78 (BP Location: Left arm, Patient Position: Sitting, BP Method: Medium (Automatic))   Pulse 88   Ht 5' 5" (1.651 " m)   Wt 75.8 kg (167 lb)   BMI 27.79 kg/m²      Physical Exam   Vitals reviewed.  Constitutional: She is oriented to person, place, and time. No distress.   HENT:   Head: Normocephalic and atraumatic.   Eyes: EOM are normal. Pupils are equal, round, and reactive to light. Right eye exhibits no discharge. Left eye exhibits no discharge.   Neck: Neck supple. No thyromegaly present.   Cardiovascular: Normal rate, regular rhythm and normal heart sounds.  Exam reveals no gallop and no friction rub.    No murmur heard.  Pulmonary/Chest: Breath sounds normal. She has no wheezes. She has no rales. She exhibits no tenderness.   Abdominal: There is no tenderness. There is no rebound and no guarding.       Right Side Rheumatological Exam     Examination finds the elbow normal.    The patient is tender to palpation of the shoulder, wrist, knee, 1st PIP, 1st MCP, 2nd PIP, 2nd MCP, 3rd PIP, 3rd MCP, 4th PIP, 4th MCP, 5th PIP and 5th MCP    She has swelling of the 1st PIP, 1st MCP, 2nd PIP, 2nd MCP, 3rd PIP, 3rd MCP, 4th PIP, 4th MCP, 5th PIP and 5th MCP    Shoulder Exam   Tenderness Location: no tenderness    Range of Motion   Active abduction: abnormal   Adduction: abnormal  Sensation: normal    Knee Exam   Patellofemoral Crepitus: positive  Effusion: positive  Sensation: normal    Hip Exam   Tenderness Location: posterior  Sensation: normal    Elbow/Wrist Exam   Tenderness Location: no tenderness  Sensation: normal    Left Side Rheumatological Exam     The patient is tender to palpation of the shoulder, elbow, wrist, knee, 1st PIP, 1st MCP, 2nd PIP, 2nd MCP, 3rd PIP, 3rd MCP, 4th PIP, 4th MCP, 5th PIP and 5th MCP.    She has swelling of the 1st PIP, 1st MCP, 2nd PIP, 2nd MCP, 3rd PIP, 3rd MCP, 4th PIP, 4th MCP, 5th PIP and 5th MCP    Shoulder Exam   Tenderness Location: no tenderness    Range of Motion   Active abduction: abnormal   Sensation: normal    Knee Exam     Patellofemoral Crepitus: positive  Effusion:  positive  Sensation: normal    Hip Exam   Tenderness Location: posterior  Sensation: normal    Elbow/Wrist Exam   Sensation: normal      Back/Neck Exam   General Inspection   Gait: normal         Lymphadenopathy:     She has no cervical adenopathy.   Neurological: She is alert and oriented to person, place, and time. Gait normal.   Skin: Skin is dry. No rash noted. No erythema. There is pallor.     Psychiatric: Mood and affect normal.   Musculoskeletal: She exhibits tenderness and deformity. She exhibits no edema.             Results for orders placed or performed in visit on 10/17/18   Comprehensive metabolic panel   Result Value Ref Range    Glucose 91 65 - 99 mg/dL    BUN, Bld 13 7 - 25 mg/dL    Creatinine 0.76 0.50 - 1.05 mg/dL    eGFR if non  90 > OR = 60 mL/min/1.73m2    eGFR if African American 104 > OR = 60 mL/min/1.73m2    BUN/Creatinine Ratio NOT APPLICABLE 6 - 22 (calc)    Sodium 139 135 - 146 mmol/L    Potassium 4.2 3.5 - 5.3 mmol/L    Chloride 104 98 - 110 mmol/L    CO2 28 20 - 32 mmol/L    Calcium 8.9 8.6 - 10.4 mg/dL    Total Protein 6.3 6.1 - 8.1 g/dL    Albumin 4.0 3.6 - 5.1 g/dL    Globulin, Total 2.3 1.9 - 3.7 g/dL (calc)    Albumin/Globulin Ratio 1.7 1.0 - 2.5 (calc)    Total Bilirubin 0.3 0.2 - 1.2 mg/dL    Alkaline Phosphatase 60 33 - 130 U/L    AST 19 10 - 35 U/L    ALT 18 6 - 29 U/L   Lymphocyte Profile II   Result Value Ref Range    % CD3 (Mature Cells) 83 57 - 85 %    Absolute CD3 + Cells 1,760 840 - 3,060 cells/uL    % CD4 ([place] Cells) 42 30 - 61 %    Absolute CD4 + Cells 863 490 - 1,740 cells/uL    % CD8 (Suppressor T Cells) 40 12 - 42 %    Absolute CD8+Cells 824 180 - 1,170 cells/uL    [place]/Suppresor Ratio 1.05 0.86 - 5.00    Cells.CD19/100 cells 10 6 - 29 %    Cells.CD19 222 110 - 660 cells/uL    Absolute Lymphocytes 2,118 850 - 3,900 cells/uL   ACTH   Result Value Ref Range    ACTH 12 6 - 50 pg/mL   Sedimentation rate, automated   Result Value Ref Range    Sed Rate  2 < OR = 30 mm/h   CBC auto differential   Result Value Ref Range    WBC 5.4 3.8 - 10.8 Thousand/uL    RBC 3.93 3.80 - 5.10 Million/uL    Hemoglobin 12.1 11.7 - 15.5 g/dL    Hematocrit 34.8 (L) 35.0 - 45.0 %    Mean Corpuscular Volume 88.5 80.0 - 100.0 fL    Mean Corpuscular Hemoglobin 30.8 27.0 - 33.0 pg    Mean Corpuscular Hemoglobin Conc 34.8 32.0 - 36.0 g/dL    RDW 12.1 11.0 - 15.0 %    Platelets 268 140 - 400 Thousand/uL    MPV 10.9 7.5 - 12.5 fL    Neutrophils Absolute 2,765 1,500 - 7,800 cells/uL    Lymph # 2,014 850 - 3,900 cells/uL    Mono # 416 200 - 950 cells/uL    Eos # 167 15 - 500 cells/uL    Baso # 38 0 - 200 cells/uL    Neutrophils Relative 51.2 %    Lymph% 37.3 %    Mono% 7.7 %    Eosinophil% 3.1 %    Basophil% 0.7 %   SALEEM IFA screen with reflex to titer and pattern   Result Value Ref Range    [place] POSITIVE (A) NEGATIVE   Antinuclear Antibodies Titer and Pattern   Result Value Ref Range    SALE[facility] Pattern HOMOGENEOUS (A)     S[facility] Titer 1:80 (H) titer   SJorgen's AB, Anti-SS-A/SS-B   Result Value Ref Range    Anti-SSA Antibody <1.0 NEG <1.0 NEG AI    Anti-SSB Antibody 2.3 POS (A) <1.0 NEG AI   IgA   Result Value Ref Range    IgA 192 81 - 463 mg/dL   IgM   Result Value Ref Range    IgM 52 48 - 271 mg/dL   C-reactive protein   Result Value Ref Range    CRP 3.3 <8.0 mg/L   IgG 1, 2, 3, and 4   Result Value Ref Range    Immunoglobulin G Subclass 1 334 (L) 382 - 929 mg/dL    Immunoglobulin G Subclass 2 251 241 - 700 mg/dL    Immunoglobulin G Subclass 3 80 22 - 178 mg/dL    Immunoglobulin G [place]ass 4 13.8 4 - 86 mg/dL    Immunoglobulin G, Serum 695 694 - 1,618 mg/dL     Last lab 6/19 quest scanned.    Assessment:          Encounter Diagnoses   Name Primary?    Sjogren's syndrome, with unspecified organ involvement Yes    Osteoarthritis of spine with radiculopathy, thoracolumbar region     CVID (common variable immunodeficiency)     Cervical (neck) region somatic dysfunction     Irritable bowel syndrome,  unspecified type     Weight gain     Gastroesophageal reflux disease, esophagitis presence not specified          Plan:     [place] was seen today for disease management.    Diagnoses and all orders for this visit:    Sjogren's syndrome, with unspecified organ involvement  -     cefTRIAXone injection 1 g  -     cyanocobalamin injection 1,000 mcg  -     Discontinue: rifAXIMin (XIFAXAN) 550 mg Tab; Take 1 tablet (550 mg total) by mouth 3 (three) times daily. for 14 days  -     rifAXIMin (XIFAXAN) 550 mg Tab; Take 1 tablet (550 mg total) by mouth 3 (three) times daily. for 14 days  -     CBC auto differential; Future  -     Comprehensive metabolic panel; Future  -     SALEEM Screen w/Reflex; Future  -     Sjogrens syndrome-A extractable nuclear antibody; Future  -     Sjogrens syndrome-B extractable nuclear antibody; Future  -     Sedimentation rate, automated; Future  -     C-reactive protein; Future  -     IgA; Future  -     IgG; Future  -     IgG 1, 2, 3, and 4; Future  -     IgM; Future  -     Celiac Disease HLA Genotyping; Future  -     Celiac Disease HLA Genotyping  -     CBC auto differential  -     Comprehensive metabolic panel  -     SALEEM Screen w/Reflex  -     Sjogrens syndrome-A extractable nuclear antibody  -     Sjogrens syndrome-B extractable nuclear antibody  -     Sedimentation rate, automated  -     C-reactive protein  -     IgA  -     IgG  -     IgG 1, 2, 3, and 4  -     IgM  -     ibuprofen-famotidine (DUEXIS) 800-26.6 mg Tab; Take 1 tablet by mouth 3 (three) times daily.    Osteoarthritis of spine with radiculopathy, thoracolumbar region  -     cefTRIAXone injection 1 g  -     cyanocobalamin injection 1,000 mcg  -     Discontinue: rifAXIMin (XIFAXAN) 550 mg Tab; Take 1 tablet (550 mg total) by mouth 3 (three) times daily. for 14 days  -     rifAXIMin (XIFAXAN) 550 mg Tab; Take 1 tablet (550 mg total) by mouth 3 (three) times daily. for 14 days  -     CBC auto differential; Future  -     Comprehensive  metabolic panel; Future  -     SALEEM Screen w/Reflex; Future  -     Sjogrens syndrome-A extractable nuclear antibody; Future  -     Sjogrens syndrome-B extractable nuclear antibody; Future  -     Sedimentation rate, automated; Future  -     C-reactive protein; Future  -     IgA; Future  -     IgG; Future  -     IgG 1, 2, 3, and 4; Future  -     IgM; Future  -     Celiac Disease HLA Genotyping; Future  -     Celiac Disease HLA Genotyping  -     CBC auto differential  -     Comprehensive metabolic panel  -     SALEEM Screen w/Reflex  -     Sjogrens syndrome-A extractable nuclear antibody  -     Sjogrens syndrome-B extractable nuclear antibody  -     Sedimentation rate, automated  -     C-reactive protein  -     IgA  -     IgG  -     IgG 1, 2, 3, and 4  -     IgM    CVID (common variable immunodeficiency)  -     cefTRIAXone injection 1 g  -     cyanocobalamin injection 1,000 mcg  -     Discontinue: rifAXIMin (XIFAXAN) 550 mg Tab; Take 1 tablet (550 mg total) by mouth 3 (three) times daily. for 14 days  -     rifAXIMin (XIFAXAN) 550 mg Tab; Take 1 tablet (550 mg total) by mouth 3 (three) times daily. for 14 days  -     CBC auto differential; Future  -     Comprehensive metabolic panel; Future  -     SALEEM Screen w/Reflex; Future  -     Sjogrens syndrome-A extractable nuclear antibody; Future  -     Sjogrens syndrome-B extractable nuclear antibody; Future  -     Sedimentation rate, automated; Future  -     C-reactive protein; Future  -     IgA; Future  -     IgG; Future  -     IgG 1, 2, 3, and 4; Future  -     IgM; Future  -     Celiac Disease HLA Genotyping; Future  -     Celiac Disease HLA Genotyping  -     CBC auto differential  -     Comprehensive metabolic panel  -     SALEEM Screen w/Reflex  -     Sjogrens syndrome-A extractable nuclear antibody  -     Sjogrens syndrome-B extractable nuclear antibody  -     Sedimentation rate, automated  -     C-reactive protein  -     IgA  -     IgG  -     IgG 1, 2, 3, and 4  -     IgM  -      ibuprofen-famotidine (DUEXIS) 800-26.6 mg Tab; Take 1 tablet by mouth 3 (three) times daily.    Cervical (neck) region somatic dysfunction  -     cefTRIAXone injection 1 g  -     cyanocobalamin injection 1,000 mcg  -     Discontinue: rifAXIMin (XIFAXAN) 550 mg Tab; Take 1 tablet (550 mg total) by mouth 3 (three) times daily. for 14 days  -     rifAXIMin (XIFAXAN) 550 mg Tab; Take 1 tablet (550 mg total) by mouth 3 (three) times daily. for 14 days  -     CBC auto differential; Future  -     Comprehensive metabolic panel; Future  -     SALEEM Screen w/Reflex; Future  -     Sjogrens syndrome-A extractable nuclear antibody; Future  -     Sjogrens syndrome-B extractable nuclear antibody; Future  -     Sedimentation rate, automated; Future  -     C-reactive protein; Future  -     IgA; Future  -     IgG; Future  -     IgG 1, 2, 3, and 4; Future  -     IgM; Future  -     Celiac Disease HLA Genotyping; Future  -     Celiac Disease HLA Genotyping  -     CBC auto differential  -     Comprehensive metabolic panel  -     SALEEM Screen w/Reflex  -     Sjogrens syndrome-A extractable nuclear antibody  -     Sjogrens syndrome-B extractable nuclear antibody  -     Sedimentation rate, automated  -     C-reactive protein  -     IgA  -     IgG  -     IgG 1, 2, 3, and 4  -     IgM  -     ibuprofen-famotidine (DUEXIS) 800-26.6 mg Tab; Take 1 tablet by mouth 3 (three) times daily.    Irritable bowel syndrome, unspecified type  -     cefTRIAXone injection 1 g  -     cyanocobalamin injection 1,000 mcg  -     Discontinue: rifAXIMin (XIFAXAN) 550 mg Tab; Take 1 tablet (550 mg total) by mouth 3 (three) times daily. for 14 days  -     rifAXIMin (XIFAXAN) 550 mg Tab; Take 1 tablet (550 mg total) by mouth 3 (three) times daily. for 14 days  -     CBC auto differential; Future  -     Comprehensive metabolic panel; Future  -     SALEEM Screen w/Reflex; Future  -     Sjogrens syndrome-A extractable nuclear antibody; Future  -     Sjogrens syndrome-B  extractable nuclear antibody; Future  -     Sedimentation rate, automated; Future  -     C-reactive protein; Future  -     IgA; Future  -     IgG; Future  -     IgG 1, 2, 3, and 4; Future  -     IgM; Future  -     Celiac Disease HLA Genotyping; Future  -     Celiac Disease HLA Genotyping  -     CBC auto differential  -     Comprehensive metabolic panel  -     SALEEM Screen w/Reflex  -     Sjogrens syndrome-A extractable nuclear antibody  -     Sjogrens syndrome-B extractable nuclear antibody  -     Sedimentation rate, automated  -     C-reactive protein  -     IgA  -     IgG  -     IgG 1, 2, 3, and 4  -     IgM    Weight gain  -     ibuprofen-famotidine (DUEXIS) 800-26.6 mg Tab; Take 1 tablet by mouth 3 (three) times daily.    Gastroesophageal reflux disease, esophagitis presence not specified  -     ibuprofen-famotidine (DUEXIS) 800-26.6 mg Tab; Take 1 tablet by mouth 3 (three) times daily.     she may have stem cell  She will hold  Plaquenil.

## 2020-05-06 ENCOUNTER — OFFICE VISIT (OUTPATIENT)
Dept: RHEUMATOLOGY | Facility: CLINIC | Age: 55
End: 2020-05-06
Payer: COMMERCIAL

## 2020-05-06 VITALS — HEIGHT: 65 IN | WEIGHT: 167 LBS | BODY MASS INDEX: 27.82 KG/M2

## 2020-05-06 DIAGNOSIS — J32.9 OTHER SINUSITIS, UNSPECIFIED CHRONICITY: ICD-10-CM

## 2020-05-06 DIAGNOSIS — M47.25 OSTEOARTHRITIS OF SPINE WITH RADICULOPATHY, THORACOLUMBAR REGION: ICD-10-CM

## 2020-05-06 DIAGNOSIS — G47.26 SHIFT WORK SLEEP DISORDER: ICD-10-CM

## 2020-05-06 DIAGNOSIS — K21.9 GASTROESOPHAGEAL REFLUX DISEASE, ESOPHAGITIS PRESENCE NOT SPECIFIED: ICD-10-CM

## 2020-05-06 DIAGNOSIS — M35.00 SJOGREN'S SYNDROME, WITH UNSPECIFIED ORGAN INVOLVEMENT: ICD-10-CM

## 2020-05-06 DIAGNOSIS — D83.9 CVID (COMMON VARIABLE IMMUNODEFICIENCY): ICD-10-CM

## 2020-05-06 DIAGNOSIS — M02.30 REACTIVE ARTHRITIS: ICD-10-CM

## 2020-05-06 DIAGNOSIS — M99.01 CERVICAL (NECK) REGION SOMATIC DYSFUNCTION: ICD-10-CM

## 2020-05-06 DIAGNOSIS — R63.5 WEIGHT GAIN: ICD-10-CM

## 2020-05-06 DIAGNOSIS — M06.9 RHEUMATOID ARTHRITIS, INVOLVING UNSPECIFIED SITE, UNSPECIFIED RHEUMATOID FACTOR PRESENCE: Primary | ICD-10-CM

## 2020-05-06 DIAGNOSIS — D83.9 IMMUNODEFICIENCY, COMMON VARIABLE: ICD-10-CM

## 2020-05-06 PROCEDURE — 3008F BODY MASS INDEX DOCD: CPT | Mod: CPTII,,, | Performed by: INTERNAL MEDICINE

## 2020-05-06 PROCEDURE — 99215 OFFICE O/P EST HI 40 MIN: CPT | Mod: 95,,, | Performed by: INTERNAL MEDICINE

## 2020-05-06 PROCEDURE — 3008F PR BODY MASS INDEX (BMI) DOCUMENTED: ICD-10-PCS | Mod: CPTII,,, | Performed by: INTERNAL MEDICINE

## 2020-05-06 PROCEDURE — 99215 PR OFFICE/OUTPT VISIT, EST, LEVL V, 40-54 MIN: ICD-10-PCS | Mod: 95,,, | Performed by: INTERNAL MEDICINE

## 2020-05-06 RX ORDER — KETOROLAC TROMETHAMINE 10 MG/1
10 TABLET, FILM COATED ORAL EVERY 6 HOURS PRN
Qty: 20 TABLET | Refills: 4 | Status: SHIPPED | OUTPATIENT
Start: 2020-05-06 | End: 2020-05-11

## 2020-05-06 RX ORDER — LANSOPRAZOLE 30 MG/1
30 CAPSULE, DELAYED RELEASE ORAL DAILY
Qty: 90 CAPSULE | Refills: 3 | Status: SHIPPED | OUTPATIENT
Start: 2020-05-06 | End: 2022-01-25

## 2020-05-06 RX ORDER — MONTELUKAST SODIUM 10 MG/1
10 TABLET ORAL NIGHTLY
Qty: 30 TABLET | Refills: 6 | Status: SHIPPED | OUTPATIENT
Start: 2020-05-06 | End: 2020-06-05

## 2020-05-06 RX ORDER — MONTELUKAST SODIUM 10 MG/1
TABLET ORAL
COMMUNITY
Start: 2020-03-10 | End: 2020-05-06 | Stop reason: SDUPTHER

## 2020-05-06 RX ORDER — KETOROLAC TROMETHAMINE 10 MG/1
TABLET, FILM COATED ORAL
COMMUNITY
Start: 2020-03-18 | End: 2020-05-06 | Stop reason: SDUPTHER

## 2020-05-06 RX ORDER — ESTRADIOL 2 MG/1
4 TABLET ORAL DAILY
Qty: 60 TABLET | Refills: 5 | Status: SHIPPED | OUTPATIENT
Start: 2020-05-06 | End: 2021-05-18

## 2020-05-06 RX ORDER — FLUCONAZOLE 100 MG/1
TABLET ORAL
Qty: 7 TABLET | Refills: 3 | Status: SHIPPED | OUTPATIENT
Start: 2020-05-06 | End: 2021-02-04 | Stop reason: SDUPTHER

## 2020-05-06 RX ORDER — SUCRALFATE 1 G/1
1 TABLET ORAL 4 TIMES DAILY
Qty: 60 TABLET | Refills: 5 | Status: SHIPPED | OUTPATIENT
Start: 2020-05-06

## 2020-05-06 RX ORDER — HYDROCODONE BITARTRATE AND ACETAMINOPHEN 10; 325 MG/1; MG/1
TABLET ORAL
COMMUNITY
Start: 2020-04-15 | End: 2023-06-01

## 2020-05-06 RX ORDER — AZELASTINE 1 MG/ML
SPRAY, METERED NASAL
COMMUNITY
Start: 2020-03-10

## 2020-05-06 RX ORDER — HYDROXYCHLOROQUINE SULFATE 200 MG/1
200 TABLET, FILM COATED ORAL 2 TIMES DAILY
Qty: 60 TABLET | Refills: 5 | Status: SHIPPED | OUTPATIENT
Start: 2020-05-06 | End: 2021-02-04

## 2020-05-06 NOTE — PROGRESS NOTES
Subjective:       Patient ID: Crystal Hein is a 54 y.o. female.    Chief Complaint: Disease Management    F/u: she has  ra and sjogren's on plaquenil off optiva  And gained weight..  Pain is located in multiple joints, both shoulder(s), both elbow(s), both wrist(s), both MCP(s): 1st, 2nd, 3rd, 4th and 5th, both PIP(s): 1st, 2nd, 3rd, 4th and 5th, both DIP(s): 1st and 2nd, both hip(s), both knee(s) and both MTP(s): 1st, 2nd, 3rd, 4th and 5th, is described as aching, pulsating, shooting and throbbing, and is constant, moderate .            She complains of joint swelling. Associated symptoms include fatigue and myalgias. Pertinent negatives include no dysuria, fever, trouble swallowing or headaches.         Review of Systems   Constitutional: Positive for chills and fatigue. Negative for activity change, appetite change, diaphoresis, fever and unexpected weight change.   HENT: Negative for congestion, dental problem, ear discharge, ear pain, facial swelling, mouth sores, nosebleeds, postnasal drip, rhinorrhea, sinus pressure, sneezing, sore throat, tinnitus, trouble swallowing and voice change.    Eyes: Negative for photophobia, pain, discharge, redness and itching.   Respiratory: Negative for apnea, cough, chest tightness, shortness of breath and wheezing.    Cardiovascular: Positive for leg swelling. Negative for chest pain and palpitations.   Gastrointestinal: Positive for abdominal pain. Negative for abdominal distention, constipation, diarrhea, nausea and vomiting.   Endocrine: Negative for cold intolerance, heat intolerance, polydipsia and polyuria.   Genitourinary: Negative for decreased urine volume, difficulty urinating, dysuria, flank pain, frequency, hematuria and urgency.   Musculoskeletal: Positive for arthralgias, back pain, joint swelling, myalgias, neck pain and neck stiffness. Negative for gait problem.   Skin: Negative for pallor, rash and wound.   Allergic/Immunologic: Negative for  "immunocompromised state.   Neurological: Positive for weakness. Negative for dizziness, tremors, numbness and headaches.   Hematological: Negative for adenopathy. Does not bruise/bleed easily.   Psychiatric/Behavioral: Negative for sleep disturbance. The patient is not nervous/anxious.          Objective:     Ht 5' 5" (1.651 m)   Wt 75.8 kg (167 lb)   BMI 27.79 kg/m²      Physical Exam   Constitutional: She is oriented to person, place, and time and well-developed, well-nourished, and in no distress.   Neurological: She is alert and oriented to person, place, and time.   Psychiatric: Mood, affect and judgment normal.           Results for orders placed or performed in visit on 10/17/18   Comprehensive metabolic panel   Result Value Ref Range    Glucose 91 65 - 99 mg/dL    BUN, Bld 13 7 - 25 mg/dL    Creatinine 0.76 0.50 - 1.05 mg/dL    eGFR if non  90 > OR = 60 mL/min/1.73m2    eGFR if African American 104 > OR = 60 mL/min/1.73m2    BUN/Creatinine Ratio NOT APPLICABLE 6 - 22 (calc)    Sodium 139 135 - 146 mmol/L    Potassium 4.2 3.5 - 5.3 mmol/L    Chloride 104 98 - 110 mmol/L    CO2 28 20 - 32 mmol/L    Calcium 8.9 8.6 - 10.4 mg/dL    Total Protein 6.3 6.1 - 8.1 g/dL    Albumin 4.0 3.6 - 5.1 g/dL    Globulin, Total 2.3 1.9 - 3.7 g/dL (calc)    Albumin/Globulin Ratio 1.7 1.0 - 2.5 (calc)    Total Bilirubin 0.3 0.2 - 1.2 mg/dL    Alkaline Phosphatase 60 33 - 130 U/L    AST 19 10 - 35 U/L    ALT 18 6 - 29 U/L   Lymphocyte Profile II   Result Value Ref Range    % CD3 (Mature Cells) 83 57 - 85 %    Absolute CD3 + Cells 1,760 840 - 3,060 cells/uL    % CD4 ([place] Cells) 42 30 - 61 %    Absolute CD4 + Cells 863 490 - 1,740 cells/uL    % CD8 (Suppressor T Cells) 40 12 - 42 %    Absolute CD8+Cells 824 180 - 1,170 cells/uL    [place]/Suppresor Ratio 1.05 0.86 - 5.00    Cells.CD19/100 cells 10 6 - 29 %    Cells.CD19 222 110 - 660 cells/uL    Absolute Lymphocytes 2,118 850 - 3,900 cells/uL   ACTH   Result " Value Ref Range    ACTH 12 6 - 50 pg/mL   Sedimentation rate, automated   Result Value Ref Range    Sed Rate 2 < OR = 30 mm/h   CBC auto differential   Result Value Ref Range    WBC 5.4 3.8 - 10.8 Thousand/uL    RBC 3.93 3.80 - 5.10 Million/uL    Hemoglobin 12.1 11.7 - 15.5 g/dL    Hematocrit 34.8 (L) 35.0 - 45.0 %    Mean Corpuscular Volume 88.5 80.0 - 100.0 fL    Mean Corpuscular Hemoglobin 30.8 27.0 - 33.0 pg    Mean Corpuscular Hemoglobin Conc 34.8 32.0 - 36.0 g/dL    RDW 12.1 11.0 - 15.0 %    Platelets 268 140 - 400 Thousand/uL    MPV 10.9 7.5 - 12.5 fL    Neutrophils Absolute 2,765 1,500 - 7,800 cells/uL    Lymph # 2,014 850 - 3,900 cells/uL    Mono # 416 200 - 950 cells/uL    Eos # 167 15 - 500 cells/uL    Baso # 38 0 - 200 cells/uL    Neutrophils Relative 51.2 %    Lymph% 37.3 %    Mono% 7.7 %    Eosinophil% 3.1 %    Basophil% 0.7 %   SALEEM IFA screen with reflex to titer and pattern   Result Value Ref Range    SALE[place] POSITIVE (A) NEGATIVE   Antinuclear Antibodies Titer and Pattern   Result Value Ref Range    SALEEM Pattern HOMOGENEOUS (A)     SALEEM Titer 1:80 (H) titer   SJorgen's AB, Anti-SS-A/SS-B   Result Value Ref Range    Anti-SSA Antibody <1.0 NEG <1.0 NEG AI    Anti-SSB Antibody 2.3 POS (A) <1.0 NEG AI   IgA   Result Value Ref Range    IgA 192 81 - 463 mg/dL   IgM   Result Value Ref Range    IgM 52 48 - 271 mg/dL   C-reactive protein   Result Value Ref Range    CRP 3.3 <8.0 mg/L   IgG 1, 2, 3, and 4   Result Value Ref Range    Immunoglobulin G Subclass 1 334 (L) 382 - 929 mg/dL    Immunoglobulin G Subclass 2 251 241 - 700 mg/dL    Immunoglobulin G Subclass 3 80 22 - 178 mg/dL    Immunoglobulin G Sublcass 4 13.8 4 - 86 mg/dL    Immunoglobulin G, Serum 695 694 - 1,618 mg/dL     Last lab 6/19 quest scanned.    Assessment:          Encounter Diagnoses   Name Primary?    Rheumatoid arthritis, involving unspecified site, unspecified rheumatoid factor presence Yes    Weight gain     Other sinusitis, unspecified  chronicity     Cervical (neck) region somatic dysfunction     Sjogren's syndrome, with unspecified organ involvement     CVID (common variable immunodeficiency)     Gastroesophageal reflux disease, esophagitis presence not specified     Reactive arthritis     Osteoarthritis of spine with radiculopathy, thoracolumbar region     Shift work sleep disorder     Immunodeficiency, common variable          Plan:     [place] was seen today for disease management.    Diagnoses and all orders for this visit:    Rheumatoid arthritis, involving unspecified site, unspecified rheumatoid factor presence  -     fluconazole (DIFLUCAN) 100 MG tablet; TAKE ONE TABLET BY MOUTH EVERY DAY FOR 7 DAYS  -     ketorolac (TORADOL) 10 mg tablet; Take 1 tablet (10 mg total) by mouth every 6 (six) hours as needed for Pain.  -     montelukast (SINGULAIR) 10 mg tablet; Take 1 tablet (10 mg total) by mouth every evening.  -     sucralfate (CARAFATE) 1 gram tablet; Take 1 tablet (1 g total) by mouth 4 (four) times daily.  -     hydroxychloroquine (PLAQUENIL) 200 mg tablet; Take 1 tablet (200 mg total) by mouth 2 (two) times daily.  -     estradioL (ESTRACE) 2 MG tablet; Take 2 tablets (4 mg total) by mouth once daily.  -     SALEEM Screen w/Reflex; Future  -     Sjogrens syndrome-A extractable nuclear antibody; Future  -     Sjogrens syndrome-B extractable nuclear antibody; Future  -     Comprehensive metabolic panel; Future  -     CBC auto differential; Future  -     Misc Sendout Test, Blood SARS-COVID 19 29557; Future  -     C-Reactive Protein; Future  -     Sedimentation rate; Future  -     Rheumatoid factor; Future  -     Cyclic Citrullinated Peptide Antibody, IgG; Future  -     IgA; Future  -     IgE; Future  -     IgG; Future  -     IgG 1, 2, 3, and 4; Future  -     IgM; Future  -     TSH; Future  -     T4, free; Future  -     T3, free; Future  -     SALEEM Screen w/Reflex  -     Sjogrens syndrome-A extractable nuclear antibody  -      Sjogrens syndrome-B extractable nuclear antibody  -     Comprehensive metabolic panel  -     CBC auto differential  -     Misc Sendout Test, Blood SARS-COVID 19 [id number]  -     C-Reactive Protein  -     Sedimentation rate  -     Rheumatoid factor  -     Cyclic Citrullinated Peptide Antibody, IgG  -     IgA  -     IgE  -     IgG  -     IgG 1, 2, 3, and 4  -     IgM  -     TSH  -     T4, free  -     T3, free    Weight gain  -     fluconazole (DIFLUCAN) 100 MG tablet; TAKE ONE TABLET BY MOUTH EVERY DAY FOR 7 DAYS  -     ketorolac (TORADOL) 10 mg tablet; Take 1 tablet (10 mg total) by mouth every 6 (six) hours as needed for Pain.  -     montelukast (SINGULAIR) 10 mg tablet; Take 1 tablet (10 mg total) by mouth every evening.  -     sucralfate (CARAFATE) 1 gram tablet; Take 1 tablet (1 g total) by mouth 4 (four) times daily.  -     hydroxychloroquine (PLAQUENIL) 200 mg tablet; Take 1 tablet (200 mg total) by mouth 2 (two) times daily.  -     estradioL (ESTRACE) 2 MG tablet; Take 2 tablets (4 mg total) by mouth once daily.  -     SALEEM Screen w/Reflex; Future  -     Sjogrens syndrome-A extractable nuclear antibody; Future  -     Sjogrens syndrome-B extractable nuclear antibody; Future  -     Comprehensive metabolic panel; Future  -     CBC auto differential; Future  -     Misc Sendout Test, Blood SARS-COVID 19 [id number]; Future  -     C-Reactive Protein; Future  -     Sedimentation rate; Future  -     Rheumatoid factor; Future  -     Cyclic Citrullinated Peptide Antibody, IgG; Future  -     IgA; Future  -     IgE; Future  -     IgG; Future  -     IgG 1, 2, 3, and 4; Future  -     IgM; Future  -     TSH; Future  -     T4, free; Future  -     T3, free; Future  -     SALEEM Screen w/Reflex  -     Sjogrens syndrome-A extractable nuclear antibody  -     Sjogrens syndrome-B extractable nuclear antibody  -     Comprehensive metabolic panel  -     CBC auto differential  -     Misc Sendout Test, Blood SARS-COVID 19 [id number]  -     C-Reactive  Protein  -     Sedimentation rate  -     Rheumatoid factor  -     Cyclic Citrullinated Peptide Antibody, IgG  -     IgA  -     IgE  -     IgG  -     IgG 1, 2, 3, and 4  -     IgM  -     TSH  -     T4, free  -     T3, free    Other sinusitis, unspecified chronicity  -     fluconazole (DIFLUCAN) 100 MG tablet; TAKE ONE TABLET BY MOUTH EVERY DAY FOR 7 DAYS  -     ketorolac (TORADOL) 10 mg tablet; Take 1 tablet (10 mg total) by mouth every 6 (six) hours as needed for Pain.  -     montelukast (SINGULAIR) 10 mg tablet; Take 1 tablet (10 mg total) by mouth every evening.  -     sucralfate (CARAFATE) 1 gram tablet; Take 1 tablet (1 g total) by mouth 4 (four) times daily.  -     hydroxychloroquine (PLAQUENIL) 200 mg tablet; Take 1 tablet (200 mg total) by mouth 2 (two) times daily.  -     estradioL (ESTRACE) 2 MG tablet; Take 2 tablets (4 mg total) by mouth once daily.  -     SALEEM Screen w/Reflex; Future  -     Sjogrens syndrome-A extractable nuclear antibody; Future  -     Sjogrens syndrome-B extractable nuclear antibody; Future  -     Comprehensive metabolic panel; Future  -     CBC auto differential; Future  -     Misc Sendout Test, Blood SARS-COVID 19 [id number]; Future  -     C-Reactive Protein; Future  -     Sedimentation rate; Future  -     Rheumatoid factor; Future  -     Cyclic Citrullinated Peptide Antibody, IgG; Future  -     IgA; Future  -     IgE; Future  -     IgG; Future  -     IgG 1, 2, 3, and 4; Future  -     IgM; Future  -     TSH; Future  -     T4, free; Future  -     T3, free; Future  -     SALEEM Screen w/Reflex  -     Sjogrens syndrome-A extractable nuclear antibody  -     Sjogrens syndrome-B extractable nuclear antibody  -     Comprehensive metabolic panel  -     CBC auto differential  -     Misc Sendout Test, Blood SARS-COVID 19 [id number]  -     C-Reactive Protein  -     Sedimentation rate  -     Rheumatoid factor  -     Cyclic Citrullinated Peptide Antibody, IgG  -     IgA  -     IgE  -     IgG  -     IgG 1, 2,  3, and 4  -     IgM  -     TSH  -     T4, free  -     T3, free    Cervical (neck) region somatic dysfunction  -     fluconazole (DIFLUCAN) 100 MG tablet; TAKE ONE TABLET BY MOUTH EVERY DAY FOR 7 DAYS  -     ketorolac (TORADOL) 10 mg tablet; Take 1 tablet (10 mg total) by mouth every 6 (six) hours as needed for Pain.  -     montelukast (SINGULAIR) 10 mg tablet; Take 1 tablet (10 mg total) by mouth every evening.  -     sucralfate (CARAFATE) 1 gram tablet; Take 1 tablet (1 g total) by mouth 4 (four) times daily.  -     hydroxychloroquine (PLAQUENIL) 200 mg tablet; Take 1 tablet (200 mg total) by mouth 2 (two) times daily.  -     estradioL (ESTRACE) 2 MG tablet; Take 2 tablets (4 mg total) by mouth once daily.  -     SALEEM Screen w/Reflex; Future  -     Sjogrens syndrome-A extractable nuclear antibody; Future  -     Sjogrens syndrome-B extractable nuclear antibody; Future  -     Comprehensive metabolic panel; Future  -     CBC auto differential; Future  -     Misc Sendout Test, Blood SARS-COVID 19 39504; Future  -     C-Reactive Protein; Future  -     Sedimentation rate; Future  -     Rheumatoid factor; Future  -     Cyclic Citrullinated Peptide Antibody, IgG; Future  -     IgA; Future  -     IgE; Future  -     IgG; Future  -     IgG 1, 2, 3, and 4; Future  -     IgM; Future  -     TSH; Future  -     T4, free; Future  -     T3, free; Future  -     SALEEM Screen w/Reflex  -     Sjogrens syndrome-A extractable nuclear antibody  -     Sjogrens syndrome-B extractable nuclear antibody  -     Comprehensive metabolic panel  -     CBC auto differential  -     Misc Sendout Test, Blood SARS-COVID 19 39504  -     C-Reactive Protein  -     Sedimentation rate  -     Rheumatoid factor  -     Cyclic Citrullinated Peptide Antibody, IgG  -     IgA  -     IgE  -     IgG  -     IgG 1, 2, 3, and 4  -     IgM  -     TSH  -     T4, free  -     T3, free    Sjogren's syndrome, with unspecified organ involvement  -     fluconazole (DIFLUCAN) 100 MG  tablet; TAKE ONE TABLET BY MOUTH EVERY DAY FOR 7 DAYS  -     ketorolac (TORADOL) 10 mg tablet; Take 1 tablet (10 mg total) by mouth every 6 (six) hours as needed for Pain.  -     montelukast (SINGULAIR) 10 mg tablet; Take 1 tablet (10 mg total) by mouth every evening.  -     sucralfate (CARAFATE) 1 gram tablet; Take 1 tablet (1 g total) by mouth 4 (four) times daily.  -     hydroxychloroquine (PLAQUENIL) 200 mg tablet; Take 1 tablet (200 mg total) by mouth 2 (two) times daily.  -     estradioL (ESTRACE) 2 MG tablet; Take 2 tablets (4 mg total) by mouth once daily.  -     SALEEM Screen w/Reflex; Future  -     Sjogrens syndrome-A extractable nuclear antibody; Future  -     Sjogrens syndrome-B extractable nuclear antibody; Future  -     Comprehensive metabolic panel; Future  -     CBC auto differential; Future  -     Misc Sendout Test, Blood SARS-COVID 19 [id number]; Future  -     C-Reactive Protein; Future  -     Sedimentation rate; Future  -     Rheumatoid factor; Future  -     Cyclic Citrullinated Peptide Antibody, IgG; Future  -     IgA; Future  -     IgE; Future  -     IgG; Future  -     IgG 1, 2, 3, and 4; Future  -     IgM; Future  -     TSH; Future  -     T4, free; Future  -     T3, free; Future  -     SALEEM Screen w/Reflex  -     Sjogrens syndrome-A extractable nuclear antibody  -     Sjogrens syndrome-B extractable nuclear antibody  -     Comprehensive metabolic panel  -     CBC auto differential  -     Misc Sendout Test, Blood SARS-COVID 19 [id number]  -     C-Reactive Protein  -     Sedimentation rate  -     Rheumatoid factor  -     Cyclic Citrullinated Peptide Antibody, IgG  -     IgA  -     IgE  -     IgG  -     IgG 1, 2, 3, and 4  -     IgM  -     TSH  -     T4, free  -     T3, free    CVID (common variable immunodeficiency)  -     fluconazole (DIFLUCAN) 100 MG tablet; TAKE ONE TABLET BY MOUTH EVERY DAY FOR 7 DAYS  -     ketorolac (TORADOL) 10 mg tablet; Take 1 tablet (10 mg total) by mouth every 6 (six) hours as  needed for Pain.  -     montelukast (SINGULAIR) 10 mg tablet; Take 1 tablet (10 mg total) by mouth every evening.  -     sucralfate (CARAFATE) 1 gram tablet; Take 1 tablet (1 g total) by mouth 4 (four) times daily.  -     hydroxychloroquine (PLAQUENIL) 200 mg tablet; Take 1 tablet (200 mg total) by mouth 2 (two) times daily.  -     estradioL (ESTRACE) 2 MG tablet; Take 2 tablets (4 mg total) by mouth once daily.  -     SALEEM Screen w/Reflex; Future  -     Sjogrens syndrome-A extractable nuclear antibody; Future  -     Sjogrens syndrome-B extractable nuclear antibody; Future  -     Comprehensive metabolic panel; Future  -     CBC auto differential; Future  -     Misc Sendout Test, Blood SARS-COVID 19 39504; Future  -     C-Reactive Protein; Future  -     Sedimentation rate; Future  -     Rheumatoid factor; Future  -     Cyclic Citrullinated Peptide Antibody, IgG; Future  -     IgA; Future  -     IgE; Future  -     IgG; Future  -     IgG 1, 2, 3, and 4; Future  -     IgM; Future  -     TSH; Future  -     T4, free; Future  -     T3, free; Future  -     SALEEM Screen w/Reflex  -     Sjogrens syndrome-A extractable nuclear antibody  -     Sjogrens syndrome-B extractable nuclear antibody  -     Comprehensive metabolic panel  -     CBC auto differential  -     Misc Sendout Test, Blood SARS-COVID 19 39504  -     C-Reactive Protein  -     Sedimentation rate  -     Rheumatoid factor  -     Cyclic Citrullinated Peptide Antibody, IgG  -     IgA  -     IgE  -     IgG  -     IgG 1, 2, 3, and 4  -     IgM  -     TSH  -     T4, free  -     T3, free    Gastroesophageal reflux disease, esophagitis presence not specified  -     fluconazole (DIFLUCAN) 100 MG tablet; TAKE ONE TABLET BY MOUTH EVERY DAY FOR 7 DAYS  -     ketorolac (TORADOL) 10 mg tablet; Take 1 tablet (10 mg total) by mouth every 6 (six) hours as needed for Pain.  -     montelukast (SINGULAIR) 10 mg tablet; Take 1 tablet (10 mg total) by mouth every evening.  -     sucralfate  (CARAFATE) 1 gram tablet; Take 1 tablet (1 g total) by mouth 4 (four) times daily.  -     hydroxychloroquine (PLAQUENIL) 200 mg tablet; Take 1 tablet (200 mg total) by mouth 2 (two) times daily.  -     estradioL (ESTRACE) 2 MG tablet; Take 2 tablets (4 mg total) by mouth once daily.  -     lansoprazole (PREVACID) 30 MG capsule; Take 1 capsule (30 mg total) by mouth once daily.  -     SALEEM Screen w/Reflex; Future  -     Sjogrens syndrome-A extractable nuclear antibody; Future  -     Sjogrens syndrome-B extractable nuclear antibody; Future  -     Comprehensive metabolic panel; Future  -     CBC auto differential; Future  -     Misc Sendout Test, Blood SARS-COVID 19 [id number]; Future  -     C-Reactive Protein; Future  -     Sedimentation rate; Future  -     Rheumatoid factor; Future  -     Cyclic Citrullinated Peptide Antibody, IgG; Future  -     IgA; Future  -     IgE; Future  -     IgG; Future  -     IgG 1, 2, 3, and 4; Future  -     IgM; Future  -     TSH; Future  -     T4, free; Future  -     T3, free; Future  -     SALEEM Screen w/Reflex  -     Sjogrens syndrome-A extractable nuclear antibody  -     Sjogrens syndrome-B extractable nuclear antibody  -     Comprehensive metabolic panel  -     CBC auto differential  -     Misc Sendout Test, Blood SARS-COVID 19 [id number]  -     C-Reactive Protein  -     Sedimentation rate  -     Rheumatoid factor  -     Cyclic Citrullinated Peptide Antibody, IgG  -     IgA  -     IgE  -     IgG  -     IgG 1, 2, 3, and 4  -     IgM  -     TSH  -     T4, free  -     T3, free    Reactive arthritis  -     fluconazole (DIFLUCAN) 100 MG tablet; TAKE ONE TABLET BY MOUTH EVERY DAY FOR 7 DAYS  -     ketorolac (TORADOL) 10 mg tablet; Take 1 tablet (10 mg total) by mouth every 6 (six) hours as needed for Pain.  -     montelukast (SINGULAIR) 10 mg tablet; Take 1 tablet (10 mg total) by mouth every evening.  -     sucralfate (CARAFATE) 1 gram tablet; Take 1 tablet (1 g total) by mouth 4 (four) times daily.  -      hydroxychloroquine (PLAQUENIL) 200 mg tablet; Take 1 tablet (200 mg total) by mouth 2 (two) times daily.  -     estradioL (ESTRACE) 2 MG tablet; Take 2 tablets (4 mg total) by mouth once daily.  -     SALEEM Screen w/Reflex; Future  -     Sjogrens syndrome-A extractable nuclear antibody; Future  -     Sjogrens syndrome-B extractable nuclear antibody; Future  -     Comprehensive metabolic panel; Future  -     CBC auto differential; Future  -     Misc Sendout Test, Blood SARS-COVID 19 39504; Future  -     C-Reactive Protein; Future  -     Sedimentation rate; Future  -     Rheumatoid factor; Future  -     Cyclic Citrullinated Peptide Antibody, IgG; Future  -     IgA; Future  -     IgE; Future  -     IgG; Future  -     IgG 1, 2, 3, and 4; Future  -     IgM; Future  -     TSH; Future  -     T4, free; Future  -     T3, free; Future  -     SALEEM Screen w/Reflex  -     Sjogrens syndrome-A extractable nuclear antibody  -     Sjogrens syndrome-B extractable nuclear antibody  -     Comprehensive metabolic panel  -     CBC auto differential  -     Misc Sendout Test, Blood SARS-COVID 19 39504  -     C-Reactive Protein  -     Sedimentation rate  -     Rheumatoid factor  -     Cyclic Citrullinated Peptide Antibody, IgG  -     IgA  -     IgE  -     IgG  -     IgG 1, 2, 3, and 4  -     IgM  -     TSH  -     T4, free  -     T3, free    Osteoarthritis of spine with radiculopathy, thoracolumbar region  -     fluconazole (DIFLUCAN) 100 MG tablet; TAKE ONE TABLET BY MOUTH EVERY DAY FOR 7 DAYS  -     ketorolac (TORADOL) 10 mg tablet; Take 1 tablet (10 mg total) by mouth every 6 (six) hours as needed for Pain.  -     montelukast (SINGULAIR) 10 mg tablet; Take 1 tablet (10 mg total) by mouth every evening.  -     sucralfate (CARAFATE) 1 gram tablet; Take 1 tablet (1 g total) by mouth 4 (four) times daily.  -     hydroxychloroquine (PLAQUENIL) 200 mg tablet; Take 1 tablet (200 mg total) by mouth 2 (two) times daily.  -     estradioL (ESTRACE)  2 MG tablet; Take 2 tablets (4 mg total) by mouth once daily.    Shift work sleep disorder  -     fluconazole (DIFLUCAN) 100 MG tablet; TAKE ONE TABLET BY MOUTH EVERY DAY FOR 7 DAYS  -     ketorolac (TORADOL) 10 mg tablet; Take 1 tablet (10 mg total) by mouth every 6 (six) hours as needed for Pain.  -     montelukast (SINGULAIR) 10 mg tablet; Take 1 tablet (10 mg total) by mouth every evening.  -     sucralfate (CARAFATE) 1 gram tablet; Take 1 tablet (1 g total) by mouth 4 (four) times daily.  -     hydroxychloroquine (PLAQUENIL) 200 mg tablet; Take 1 tablet (200 mg total) by mouth 2 (two) times daily.  -     estradioL (ESTRACE) 2 MG tablet; Take 2 tablets (4 mg total) by mouth once daily.    Immunodeficiency, common variable     The patient location is: home  The chief complaint leading to consultation is: RA,   Visit type: audiovisual  Total time spent with patient: 41 min  Each patient to whom he or she provides medical services by telemedicine is:  (1) informed of the relationship between the physician and patient and the respective role of any other health care provider with respect to management of the patient; and (2) notified that he or she may decline to receive medical services by telemedicine and may withdraw from such care at any time.

## 2020-05-14 ENCOUNTER — DOCUMENTATION ONLY (OUTPATIENT)
Dept: RHEUMATOLOGY | Facility: CLINIC | Age: 55
End: 2020-05-14

## 2020-09-10 ENCOUNTER — OFFICE VISIT (OUTPATIENT)
Dept: RHEUMATOLOGY | Facility: CLINIC | Age: 55
End: 2020-09-10
Payer: COMMERCIAL

## 2020-09-10 DIAGNOSIS — K21.00 GASTROESOPHAGEAL REFLUX DISEASE WITH ESOPHAGITIS: Primary | ICD-10-CM

## 2020-09-10 DIAGNOSIS — D83.9 CVID (COMMON VARIABLE IMMUNODEFICIENCY): ICD-10-CM

## 2020-09-10 DIAGNOSIS — M06.9 RHEUMATOID ARTHRITIS, INVOLVING UNSPECIFIED SITE, UNSPECIFIED RHEUMATOID FACTOR PRESENCE: ICD-10-CM

## 2020-09-10 DIAGNOSIS — M35.00 SJOGREN'S SYNDROME, WITH UNSPECIFIED ORGAN INVOLVEMENT: ICD-10-CM

## 2020-09-10 PROCEDURE — 99215 OFFICE O/P EST HI 40 MIN: CPT | Mod: 95,,, | Performed by: INTERNAL MEDICINE

## 2020-09-10 PROCEDURE — 99215 PR OFFICE/OUTPT VISIT, EST, LEVL V, 40-54 MIN: ICD-10-PCS | Mod: 95,,, | Performed by: INTERNAL MEDICINE

## 2020-09-10 RX ORDER — KETOROLAC TROMETHAMINE 10 MG/1
10 TABLET, FILM COATED ORAL EVERY 6 HOURS
Qty: 20 TABLET | Refills: 3 | Status: SHIPPED | OUTPATIENT
Start: 2020-09-10 | End: 2021-05-18

## 2020-09-10 RX ORDER — PREDNISONE 5 MG/1
5 TABLET ORAL DAILY PRN
Qty: 30 TABLET | Refills: 4 | Status: SHIPPED | OUTPATIENT
Start: 2020-09-10 | End: 2020-10-10

## 2020-09-10 NOTE — PROGRESS NOTES
Subjective:       Patient ID: Crystal Hein is a 54 y.o. female.    Chief Complaint: No chief complaint on file.    F/u: she has  ra and sjogren's on plaquenil, restarted diet. She had a GI issue.  Patient states she had a small by to see she which got stuck in caused chest pain as well as nausea and vomiting she felt as if the food would not go down and it was painful she would like to follow up with GI.  Pain is located in multiple joints, both shoulder(s), both elbow(s), both wrist(s), both MCP(s): 1st, 2nd, 3rd, 4th and 5th, both PIP(s): 1st, 2nd, 3rd, 4th and 5th, both DIP(s): 1st and 2nd, both hip(s), both knee(s) and both MTP(s): 1st, 2nd, 3rd, 4th and 5th, is described as aching, pulsating, shooting and throbbing, and is constant, moderate .            She complains of joint swelling. Associated symptoms include fatigue and myalgias. Pertinent negatives include no dysuria, fever, trouble swallowing or headaches.         Review of Systems   Constitutional: Positive for chills and fatigue. Negative for activity change, appetite change, diaphoresis, fever and unexpected weight change.   HENT: Negative for congestion, dental problem, ear discharge, ear pain, facial swelling, mouth sores, nosebleeds, postnasal drip, rhinorrhea, sinus pressure, sneezing, sore throat, tinnitus, trouble swallowing and voice change.    Eyes: Negative for photophobia, pain, discharge, redness and itching.   Respiratory: Negative for apnea, cough, chest tightness, shortness of breath and wheezing.    Cardiovascular: Positive for leg swelling. Negative for chest pain and palpitations.   Gastrointestinal: Positive for abdominal pain. Negative for abdominal distention, constipation, diarrhea, nausea and vomiting.   Endocrine: Negative for cold intolerance, heat intolerance, polydipsia and polyuria.   Genitourinary: Negative for decreased urine volume, difficulty urinating, dysuria, flank pain, frequency, hematuria and urgency.    Musculoskeletal: Positive for arthralgias, back pain, joint swelling, myalgias, neck pain and neck stiffness. Negative for gait problem.   Skin: Negative for pallor, rash and wound.   Allergic/Immunologic: Negative for immunocompromised state.   Neurological: Positive for weakness. Negative for dizziness, tremors, numbness and headaches.   Hematological: Negative for adenopathy. Does not bruise/bleed easily.   Psychiatric/Behavioral: Negative for sleep disturbance. The patient is not nervous/anxious.          Objective:     There were no vitals taken for this visit.     Physical Exam   Constitutional: She is oriented to person, place, and time and well-developed, well-nourished, and in no distress.   Neurological: She is alert and oriented to person, place, and time.   Psychiatric: Mood, affect and judgment normal.           Results for orders placed or performed in visit on 10/17/18   Comprehensive metabolic panel   Result Value Ref Range    Glucose 91 65 - 99 mg/dL    BUN, Bld 13 7 - 25 mg/dL    Creatinine 0.76 0.50 - 1.05 mg/dL    eGFR if non  90 > OR = 60 mL/min/1.73m2    eGFR if African American 104 > OR = 60 mL/min/1.73m2    BUN/Creatinine Ratio NOT APPLICABLE 6 - 22 (calc)    Sodium 139 135 - 146 mmol/L    Potassium 4.2 3.5 - 5.3 mmol/L    Chloride 104 98 - 110 mmol/L    CO2 28 20 - 32 mmol/L    Calcium 8.9 8.6 - 10.4 mg/dL    Total Protein 6.3 6.1 - 8.1 g/dL    Albumin 4.0 3.6 - 5.1 g/dL    Globulin, Total 2.3 1.9 - 3.7 g/dL (calc)    Albumin/Globulin Ratio 1.7 1.0 - 2.5 (calc)    Total Bilirubin 0.3 0.2 - 1.2 mg/dL    Alkaline Phosphatase 60 33 - 130 U/L    AST 19 10 - 35 U/L    ALT 18 6 - 29 U/L   Lymphocyte Profile II   Result Value Ref Range    % CD3 (Mature Cells) 83 57 - 85 %    Absolute CD3 + Cells 1,760 840 - 3,060 cells/uL    % CD4 ([place] Cells) 42 30 - 61 %    Absolute CD4 + Cells 863 490 - 1,740 cells/uL    % CD8 (Suppressor T Cells) 40 12 - 42 %    Absolute CD8+Cells 824 180 -  1,170 cells/uL    [place]/Suppresor Ratio 1.05 0.86 - 5.00    Cells.CD19/100 cells 10 6 - 29 %    Cells.CD19 222 110 - 660 cells/uL    Absolute Lymphocytes 2,118 850 - 3,900 cells/uL   ACTH   Result Value Ref Range    ACTH 12 6 - 50 pg/mL   Sedimentation rate, automated   Result Value Ref Range    Sed Rate 2 < OR = 30 mm/h   CBC auto differential   Result Value Ref Range    WBC 5.4 3.8 - 10.8 Thousand/uL    RBC 3.93 3.80 - 5.10 Million/uL    Hemoglobin 12.1 11.7 - 15.5 g/dL    Hematocrit 34.8 (L) 35.0 - 45.0 %    Mean Corpuscular Volume 88.5 80.0 - 100.0 fL    Mean Corpuscular Hemoglobin 30.8 27.0 - 33.0 pg    Mean Corpuscular Hemoglobin Conc 34.8 32.0 - 36.0 g/dL    RDW 12.1 11.0 - 15.0 %    Platelets 268 140 - 400 Thousand/uL    MPV 10.9 7.5 - 12.5 fL    Neutrophils Absolute 2,765 1,500 - 7,800 cells/uL    Lymph # 2,014 850 - 3,900 cells/uL    Mono # 416 200 - 950 cells/uL    Eos # 167 15 - 500 cells/uL    Baso # 38 0 - 200 cells/uL    Neutrophils Relative 51.2 %    Lymph% 37.3 %    Mono% 7.7 %    Eosinophil% 3.1 %    Basophil% 0.7 %   SALE[facility] IFA screen with reflex to titer and pattern   Result Value Ref Range    [facility] POSITIVE (A) NEGATIVE   Antinuclear Antibodies Titer and Pattern   Result Value Ref Range    S[facility] Pattern HOMOGENEOUS (A)     S[facility] Titer 1:80 (H) titer   SJorgen's AB, Anti-SS-A/SS-B   Result Value Ref Range    Anti-SSA Antibody <1.0 NEG <1.0 NEG AI    Anti-SSB Antibody 2.3 POS (A) <1.0 NEG AI   IgA   Result Value Ref Range    IgA 192 81 - 463 mg/dL   IgM   Result Value Ref Range    IgM 52 48 - 271 mg/dL   C-reactive protein   Result Value Ref Range    CRP 3.3 <8.0 mg/L   IgG 1, 2, 3, and 4   Result Value Ref Range    Immunoglobulin G Subclass 1 334 (L) 382 - 929 mg/dL    Immunoglobulin G Subclass 2 251 241 - 700 mg/dL    Immunoglobulin G Subclass 3 80 22 - 178 mg/dL    Immunoglobulin G Sublcass 4 13.8 4 - 86 mg/dL    Immunoglobulin G, Serum 695 694 - 1,618 mg/dL     reviewed labs with patient during this  visit       Assessment:          Encounter Diagnoses   Name Primary?    Gastroesophageal reflux disease with esophagitis Yes    Rheumatoid arthritis, involving unspecified site, unspecified rheumatoid factor presence     CVID (common variable immunodeficiency)     Sjogren's syndrome, with unspecified organ involvement          Plan:     Diagnoses and all orders for this visit:    Gastroesophageal reflux disease with esophagitis  -     Ambulatory referral/consult to Gastroenterology; Future  -     predniSONE (DELTASONE) 5 MG tablet; Take 1 tablet (5 mg total) by mouth daily as needed.  -     [facility] Screen w/Reflex; Future  -     IgA; Future  -     IgG; Future  -     IgE; Future  -     IgG 1, 2, 3, and 4; Future  -     IgM; Future  -     Comprehensive metabolic panel; Future  -     CBC auto differential; Future  -     C-Reactive Protein; Future  -     Sedimentation rate; Future  -     Rheumatoid factor; Future  -     Cyclic Citrullinated Peptide Antibody, IgG; Future  -     TSH; Future  -     T4, free; Future  -     T3, free; Future  -     Vitamin D; Future  -     S[facility] Screen w/Reflex  -     IgA  -     IgG  -     IgE  -     IgG 1, 2, 3, and 4  -     IgM  -     Comprehensive metabolic panel  -     CBC auto differential  -     C-Reactive Protein  -     Sedimentation rate  -     Rheumatoid factor  -     Cyclic Citrullinated Peptide Antibody, IgG  -     TSH  -     T4, free  -     T3, free  -     Vitamin D  -     ketorolac (TORADOL) 10 mg tablet; Take 1 tablet (10 mg total) by mouth every 6 (six) hours. for 5 days    Rheumatoid arthritis, involving unspecified site, unspecified rheumatoid factor presence  -     predniSONE (DELTASONE) 5 MG tablet; Take 1 tablet (5 mg total) by mouth daily as needed.  -     [facility] Screen w/Reflex; Future  -     IgA; Future  -     IgG; Future  -     IgE; Future  -     IgG 1, 2, 3, and 4; Future  -     IgM; Future  -     Comprehensive metabolic panel; Future  -     CBC auto differential;  Future  -     C-Reactive Protein; Future  -     Sedimentation rate; Future  -     Rheumatoid factor; Future  -     Cyclic Citrullinated Peptide Antibody, IgG; Future  -     TSH; Future  -     T4, free; Future  -     T3, free; Future  -     Vitamin D; Future  -     [facility] Screen w/Reflex  -     IgA  -     IgG  -     IgE  -     IgG 1, 2, 3, and 4  -     IgM  -     Comprehensive metabolic panel  -     CBC auto differential  -     C-Reactive Protein  -     Sedimentation rate  -     Rheumatoid factor  -     Cyclic Citrullinated Peptide Antibody, IgG  -     TSH  -     T4, free  -     T3, free  -     Vitamin D  -     ketorolac (TORADOL) 10 mg tablet; Take 1 tablet (10 mg total) by mouth every 6 (six) hours. for 5 days    CVID (common variable immunodeficiency)  -     predniSONE (DELTASONE) 5 MG tablet; Take 1 tablet (5 mg total) by mouth daily as needed.  -     S[facility] Screen w/Reflex; Future  -     IgA; Future  -     IgG; Future  -     IgE; Future  -     IgG 1, 2, 3, and 4; Future  -     IgM; Future  -     Comprehensive metabolic panel; Future  -     CBC auto differential; Future  -     C-Reactive Protein; Future  -     Sedimentation rate; Future  -     Rheumatoid factor; Future  -     Cyclic Citrullinated Peptide Antibody, IgG; Future  -     TSH; Future  -     T4, free; Future  -     T3, free; Future  -     Vitamin D; Future  -     [facility] Screen w/Reflex  -     IgA  -     IgG  -     IgE  -     IgG 1, 2, 3, and 4  -     IgM  -     Comprehensive metabolic panel  -     CBC auto differential  -     C-Reactive Protein  -     Sedimentation rate  -     Rheumatoid factor  -     Cyclic Citrullinated Peptide Antibody, IgG  -     TSH  -     T4, free  -     T3, free  -     Vitamin D  -     ketorolac (TORADOL) 10 mg tablet; Take 1 tablet (10 mg total) by mouth every 6 (six) hours. for 5 days    Sjogren's syndrome, with unspecified organ involvement  -     predniSONE (DELTASONE) 5 MG tablet; Take 1 tablet (5 mg total) by mouth daily as  needed.  -     SALE[facility] Screen w/Reflex; Future  -     IgA; Future  -     IgG; Future  -     IgE; Future  -     IgG 1, 2, 3, and 4; Future  -     IgM; Future  -     Comprehensive metabolic panel; Future  -     CBC auto differential; Future  -     C-Reactive Protein; Future  -     Sedimentation rate; Future  -     Rheumatoid factor; Future  -     Cyclic Citrullinated Peptide Antibody, IgG; Future  -     TSH; Future  -     T4, free; Future  -     T3, free; Future  -     Vitamin D; Future  -     S[facility] Screen w/Reflex  -     IgA  -     IgG  -     IgE  -     IgG 1, 2, 3, and 4  -     IgM  -     Comprehensive metabolic panel  -     CBC auto differential  -     C-Reactive Protein  -     Sedimentation rate  -     Rheumatoid factor  -     Cyclic Citrullinated Peptide Antibody, IgG  -     TSH  -     T4, free  -     T3, free  -     Vitamin D  -     ketorolac (TORADOL) 10 mg tablet; Take 1 tablet (10 mg total) by mouth every 6 (six) hours. for 5 days     The patient location is: home  The chief complaint leading to consultation is: RA,   Visit type: audiovisual  Total time spent with patient: 41 min  Each patient to whom he or she provides medical services by telemedicine is:  (1) informed of the relationship between the physician and patient and the respective role of any other health care provider with respect to management of the patient; and (2) notified that he or she may decline to receive medical services by telemedicine and may withdraw from such care at any time.

## 2020-11-23 ENCOUNTER — DOCUMENTATION ONLY (OUTPATIENT)
Dept: RHEUMATOLOGY | Facility: CLINIC | Age: 55
End: 2020-11-23

## 2020-11-23 NOTE — PROGRESS NOTES
Received progress notes from Dr Mendoza. Plan to do EGD with MAC for further evaluation and preform esophageal dilation. Schedule Colon with MAC ordered protonix 40 mg daily Zofran 4 mg as needed.

## 2021-02-02 LAB
ERYTHROCYTE [SEDIMENTATION RATE] IN BLOOD BY WESTERGREN METHOD: 6 MM/H
IGA SERPL-MCNC: 182 MG/DL (ref 47–310)
IGG SERPL-MCNC: 736 MG/DL (ref 600–1640)
T3FREE SERPL-MCNC: 3.7 PG/ML (ref 2.3–4.2)
T4 FREE SERPL-MCNC: 0.9 NG/DL (ref 0.8–1.8)

## 2021-02-03 LAB
25(OH)D3 SERPL-MCNC: 19 NG/ML (ref 30–100)
ALBUMIN SERPL-MCNC: 4 G/DL (ref 3.6–5.1)
ALBUMIN/GLOB SERPL: 1.7 (CALC) (ref 1–2.5)
ALP SERPL-CCNC: 47 U/L (ref 37–153)
ALT SERPL-CCNC: 11 U/L (ref 6–29)
ANA PAT SER IF-IMP: ABNORMAL
ANA SER QL IF: POSITIVE
ANA TITR SER IF: ABNORMAL TITER
AST SERPL-CCNC: 12 U/L (ref 10–35)
BASOPHILS # BLD AUTO: 31 CELLS/UL (ref 0–200)
BASOPHILS NFR BLD AUTO: 0.7 %
BILIRUB SERPL-MCNC: 0.3 MG/DL (ref 0.2–1.2)
BUN SERPL-MCNC: 13 MG/DL (ref 7–25)
BUN/CREAT SERPL: ABNORMAL (CALC) (ref 6–22)
CALCIUM SERPL-MCNC: 8.9 MG/DL (ref 8.6–10.4)
CCP IGG SERPL-ACNC: 114 UNITS
CHLORIDE SERPL-SCNC: 107 MMOL/L (ref 98–110)
CO2 SERPL-SCNC: 27 MMOL/L (ref 20–32)
CREAT SERPL-MCNC: 0.79 MG/DL (ref 0.5–1.05)
CRP SERPL-MCNC: 6.1 MG/L
EOSINOPHIL # BLD AUTO: 119 CELLS/UL (ref 15–500)
EOSINOPHIL NFR BLD AUTO: 2.7 %
ERYTHROCYTE [DISTWIDTH] IN BLOOD BY AUTOMATED COUNT: 12.4 % (ref 11–15)
GFRSERPLBLD MDRD-ARVRAT: 84 ML/MIN/1.73M2
GLOBULIN SER CALC-MCNC: 2.3 G/DL (CALC) (ref 1.9–3.7)
GLUCOSE SERPL-MCNC: 119 MG/DL (ref 65–99)
HCT VFR BLD AUTO: 33.1 % (ref 35–45)
HGB BLD-MCNC: 11.4 G/DL (ref 11.7–15.5)
IGE SERPL-ACNC: 29 KU/L
IGG SERPL-MCNC: 709 MG/DL (ref 600–1640)
IGG1 SER-MCNC: 374 MG/DL (ref 382–929)
IGG2 SER-MCNC: 260 MG/DL (ref 241–700)
IGG3 SER-MCNC: 80 MG/DL (ref 22–178)
IGG4 SER-MCNC: 8.9 MG/DL (ref 4–86)
IGM SERPL-MCNC: 54 MG/DL (ref 50–300)
LYMPHOCYTES # BLD AUTO: 1698 CELLS/UL (ref 850–3900)
LYMPHOCYTES NFR BLD AUTO: 38.6 %
MCH RBC QN AUTO: 31.1 PG (ref 27–33)
MCHC RBC AUTO-ENTMCNC: 34.4 G/DL (ref 32–36)
MCV RBC AUTO: 90.2 FL (ref 80–100)
MONOCYTES # BLD AUTO: 383 CELLS/UL (ref 200–950)
MONOCYTES NFR BLD AUTO: 8.7 %
NEUTROPHILS # BLD AUTO: 2169 CELLS/UL (ref 1500–7800)
NEUTROPHILS NFR BLD AUTO: 49.3 %
PLATELET # BLD AUTO: 234 THOUSAND/UL (ref 140–400)
PMV BLD REES-ECKER: 10.7 FL (ref 7.5–12.5)
POTASSIUM SERPL-SCNC: 4.1 MMOL/L (ref 3.5–5.3)
PROT SERPL-MCNC: 6.3 G/DL (ref 6.1–8.1)
RBC # BLD AUTO: 3.67 MILLION/UL (ref 3.8–5.1)
RHEUMATOID FACT SERPL-ACNC: <14 IU/ML
SODIUM SERPL-SCNC: 140 MMOL/L (ref 135–146)
TSH SERPL-ACNC: 2.24 MIU/L
WBC # BLD AUTO: 4.4 THOUSAND/UL (ref 3.8–10.8)

## 2021-02-04 ENCOUNTER — OFFICE VISIT (OUTPATIENT)
Dept: RHEUMATOLOGY | Facility: CLINIC | Age: 56
End: 2021-02-04
Payer: COMMERCIAL

## 2021-02-04 ENCOUNTER — PATIENT MESSAGE (OUTPATIENT)
Dept: RHEUMATOLOGY | Facility: CLINIC | Age: 56
End: 2021-02-04

## 2021-02-04 VITALS
WEIGHT: 178 LBS | HEART RATE: 97 BPM | HEIGHT: 65 IN | DIASTOLIC BLOOD PRESSURE: 90 MMHG | SYSTOLIC BLOOD PRESSURE: 140 MMHG | BODY MASS INDEX: 29.66 KG/M2

## 2021-02-04 DIAGNOSIS — M35.00 SJOGREN'S SYNDROME, WITH UNSPECIFIED ORGAN INVOLVEMENT: ICD-10-CM

## 2021-02-04 DIAGNOSIS — J32.9 OTHER SINUSITIS, UNSPECIFIED CHRONICITY: ICD-10-CM

## 2021-02-04 DIAGNOSIS — M02.30 REACTIVE ARTHRITIS: ICD-10-CM

## 2021-02-04 DIAGNOSIS — D83.9 CVID (COMMON VARIABLE IMMUNODEFICIENCY): Primary | ICD-10-CM

## 2021-02-04 DIAGNOSIS — R63.5 WEIGHT GAIN: ICD-10-CM

## 2021-02-04 DIAGNOSIS — K21.00 GASTROESOPHAGEAL REFLUX DISEASE WITH ESOPHAGITIS WITHOUT HEMORRHAGE: ICD-10-CM

## 2021-02-04 DIAGNOSIS — M99.01 CERVICAL (NECK) REGION SOMATIC DYSFUNCTION: ICD-10-CM

## 2021-02-04 DIAGNOSIS — M47.25 OSTEOARTHRITIS OF SPINE WITH RADICULOPATHY, THORACOLUMBAR REGION: ICD-10-CM

## 2021-02-04 DIAGNOSIS — G47.26 SHIFT WORK SLEEP DISORDER: ICD-10-CM

## 2021-02-04 PROCEDURE — 96372 PR INJECTION,THERAP/PROPH/DIAG2ST, IM OR SUBCUT: ICD-10-PCS | Mod: S$GLB,,, | Performed by: INTERNAL MEDICINE

## 2021-02-04 PROCEDURE — 3008F BODY MASS INDEX DOCD: CPT | Mod: CPTII,S$GLB,, | Performed by: INTERNAL MEDICINE

## 2021-02-04 PROCEDURE — 96372 THER/PROPH/DIAG INJ SC/IM: CPT | Mod: S$GLB,,, | Performed by: INTERNAL MEDICINE

## 2021-02-04 PROCEDURE — 1125F PR PAIN SEVERITY QUANTIFIED, PAIN PRESENT: ICD-10-PCS | Mod: S$GLB,,, | Performed by: INTERNAL MEDICINE

## 2021-02-04 PROCEDURE — 99999 PR PBB SHADOW E&M-EST. PATIENT-LVL III: ICD-10-PCS | Mod: PBBFAC,,, | Performed by: INTERNAL MEDICINE

## 2021-02-04 PROCEDURE — 99215 OFFICE O/P EST HI 40 MIN: CPT | Mod: 25,S$GLB,, | Performed by: INTERNAL MEDICINE

## 2021-02-04 PROCEDURE — 3008F PR BODY MASS INDEX (BMI) DOCUMENTED: ICD-10-PCS | Mod: CPTII,S$GLB,, | Performed by: INTERNAL MEDICINE

## 2021-02-04 PROCEDURE — 99215 PR OFFICE/OUTPT VISIT, EST, LEVL V, 40-54 MIN: ICD-10-PCS | Mod: 25,S$GLB,, | Performed by: INTERNAL MEDICINE

## 2021-02-04 PROCEDURE — 99999 PR PBB SHADOW E&M-EST. PATIENT-LVL III: CPT | Mod: PBBFAC,,, | Performed by: INTERNAL MEDICINE

## 2021-02-04 PROCEDURE — 1125F AMNT PAIN NOTED PAIN PRSNT: CPT | Mod: S$GLB,,, | Performed by: INTERNAL MEDICINE

## 2021-02-04 RX ORDER — FLUCONAZOLE 100 MG/1
TABLET ORAL
Qty: 7 TABLET | Refills: 3 | Status: SHIPPED | OUTPATIENT
Start: 2021-02-04 | End: 2022-01-25

## 2021-02-04 RX ORDER — ERGOCALCIFEROL 1.25 MG/1
50000 CAPSULE ORAL
Qty: 4 CAPSULE | Refills: 6 | Status: SHIPPED | OUTPATIENT
Start: 2021-02-04 | End: 2022-01-15

## 2021-02-04 RX ORDER — MECLIZINE HYDROCHLORIDE 25 MG/1
25 TABLET ORAL 3 TIMES DAILY PRN
Qty: 90 TABLET | Refills: 3 | Status: SHIPPED | OUTPATIENT
Start: 2021-02-04 | End: 2022-05-11

## 2021-02-04 RX ORDER — AZITHROMYCIN 250 MG/1
TABLET, FILM COATED ORAL
Qty: 10 TABLET | Refills: 0 | Status: SHIPPED | OUTPATIENT
Start: 2021-02-04 | End: 2021-05-25

## 2021-02-04 RX ORDER — CEFTRIAXONE 1 G/1
1 INJECTION, POWDER, FOR SOLUTION INTRAMUSCULAR; INTRAVENOUS
Status: COMPLETED | OUTPATIENT
Start: 2021-02-04 | End: 2021-02-04

## 2021-02-04 RX ORDER — DEXTROAMPHETAMINE SACCHARATE, AMPHETAMINE ASPARTATE, DEXTROAMPHETAMINE SULFATE AND AMPHETAMINE SULFATE 2.5; 2.5; 2.5; 2.5 MG/1; MG/1; MG/1; MG/1
1 TABLET ORAL DAILY
COMMUNITY
Start: 2021-02-01 | End: 2023-07-17

## 2021-02-04 RX ORDER — ALPRAZOLAM 0.25 MG/1
0.25 TABLET ORAL 3 TIMES DAILY
Qty: 20 TABLET | Refills: 0 | Status: SHIPPED | OUTPATIENT
Start: 2021-02-04 | End: 2022-01-25

## 2021-02-04 RX ORDER — MONTELUKAST SODIUM 10 MG/1
10 TABLET ORAL NIGHTLY
COMMUNITY
Start: 2020-11-18 | End: 2021-05-18

## 2021-02-04 RX ORDER — METHYLPREDNISOLONE ACETATE 80 MG/ML
80 INJECTION, SUSPENSION INTRA-ARTICULAR; INTRALESIONAL; INTRAMUSCULAR; SOFT TISSUE
Status: COMPLETED | OUTPATIENT
Start: 2021-02-04 | End: 2021-02-04

## 2021-02-04 RX ORDER — BISACODYL 5 MG
TABLET, DELAYED RELEASE (ENTERIC COATED) ORAL
COMMUNITY
Start: 2020-11-20

## 2021-02-04 RX ORDER — SODIUM, POTASSIUM,MAG SULFATES 17.5-3.13G
SOLUTION, RECONSTITUTED, ORAL ORAL
COMMUNITY
Start: 2020-11-20 | End: 2023-07-17

## 2021-02-04 RX ORDER — HYDROXYCHLOROQUINE SULFATE 200 MG/1
200 TABLET, FILM COATED ORAL 2 TIMES DAILY
Qty: 60 TABLET | Refills: 6 | Status: SHIPPED | OUTPATIENT
Start: 2021-02-04 | End: 2021-03-06

## 2021-02-04 RX ADMIN — CEFTRIAXONE 1 G: 1 INJECTION, POWDER, FOR SOLUTION INTRAMUSCULAR; INTRAVENOUS at 11:02

## 2021-02-04 RX ADMIN — METHYLPREDNISOLONE ACETATE 80 MG: 80 INJECTION, SUSPENSION INTRA-ARTICULAR; INTRALESIONAL; INTRAMUSCULAR; SOFT TISSUE at 11:02

## 2021-02-04 ASSESSMENT — ROUTINE ASSESSMENT OF PATIENT INDEX DATA (RAPID3)
FATIGUE SCORE: 1.1
PAIN SCORE: 4.5
PATIENT GLOBAL ASSESSMENT SCORE: 5
TOTAL RAPID3 SCORE: 3.94
PSYCHOLOGICAL DISTRESS SCORE: 0
MDHAQ FUNCTION SCORE: 0.7

## 2021-03-22 LAB
ALBUMIN SERPL-MCNC: 4.2 G/DL (ref 3.6–5.1)
ALBUMIN/GLOB SERPL: 1.8 (CALC) (ref 1–2.5)
ALP SERPL-CCNC: 58 U/L (ref 37–153)
ALT SERPL-CCNC: 12 U/L (ref 6–29)
AST SERPL-CCNC: 15 U/L (ref 10–35)
BASOPHILS # BLD AUTO: 41 CELLS/UL (ref 0–200)
BASOPHILS NFR BLD AUTO: 0.9 %
BILIRUB SERPL-MCNC: 0.4 MG/DL (ref 0.2–1.2)
BUN SERPL-MCNC: 16 MG/DL (ref 7–25)
BUN/CREAT SERPL: NORMAL (CALC) (ref 6–22)
CALCIUM SERPL-MCNC: 9.1 MG/DL (ref 8.6–10.4)
CCP IGG SERPL-ACNC: <16 UNITS
CHLORIDE SERPL-SCNC: 104 MMOL/L (ref 98–110)
CO2 SERPL-SCNC: 32 MMOL/L (ref 20–32)
CREAT SERPL-MCNC: 0.85 MG/DL (ref 0.5–1.05)
EOSINOPHIL # BLD AUTO: 101 CELLS/UL (ref 15–500)
EOSINOPHIL NFR BLD AUTO: 2.2 %
ERYTHROCYTE [DISTWIDTH] IN BLOOD BY AUTOMATED COUNT: 12.1 % (ref 11–15)
ERYTHROCYTE [SEDIMENTATION RATE] IN BLOOD BY WESTERGREN METHOD: 6 MM/H
GFRSERPLBLD MDRD-ARVRAT: 77 ML/MIN/1.73M2
GLOBULIN SER CALC-MCNC: 2.3 G/DL (CALC) (ref 1.9–3.7)
GLUCOSE SERPL-MCNC: 97 MG/DL (ref 65–99)
HCT VFR BLD AUTO: 34.8 % (ref 35–45)
HGB BLD-MCNC: 11.7 G/DL (ref 11.7–15.5)
LYMPHOCYTES # BLD AUTO: 1900 CELLS/UL (ref 850–3900)
LYMPHOCYTES NFR BLD AUTO: 41.3 %
MCH RBC QN AUTO: 30.1 PG (ref 27–33)
MCHC RBC AUTO-ENTMCNC: 33.6 G/DL (ref 32–36)
MCV RBC AUTO: 89.5 FL (ref 80–100)
MONOCYTES # BLD AUTO: 382 CELLS/UL (ref 200–950)
MONOCYTES NFR BLD AUTO: 8.3 %
NEUTROPHILS # BLD AUTO: 2176 CELLS/UL (ref 1500–7800)
NEUTROPHILS NFR BLD AUTO: 47.3 %
PLATELET # BLD AUTO: 246 THOUSAND/UL (ref 140–400)
PMV BLD REES-ECKER: 10.4 FL (ref 7.5–12.5)
POTASSIUM SERPL-SCNC: 4.5 MMOL/L (ref 3.5–5.3)
PROT SERPL-MCNC: 6.5 G/DL (ref 6.1–8.1)
RBC # BLD AUTO: 3.89 MILLION/UL (ref 3.8–5.1)
RHEUMATOID FACT SERPL-ACNC: <14 IU/ML
SODIUM SERPL-SCNC: 141 MMOL/L (ref 135–146)
WBC # BLD AUTO: 4.6 THOUSAND/UL (ref 3.8–10.8)

## 2021-03-24 LAB — SARS-COV-2 IGG SERPL QL IA: POSITIVE

## 2021-05-25 ENCOUNTER — TELEPHONE (OUTPATIENT)
Dept: RHEUMATOLOGY | Facility: CLINIC | Age: 56
End: 2021-05-25

## 2021-05-25 ENCOUNTER — OFFICE VISIT (OUTPATIENT)
Dept: RHEUMATOLOGY | Facility: CLINIC | Age: 56
End: 2021-05-25
Payer: COMMERCIAL

## 2021-05-25 VITALS
WEIGHT: 174 LBS | HEART RATE: 85 BPM | DIASTOLIC BLOOD PRESSURE: 79 MMHG | SYSTOLIC BLOOD PRESSURE: 128 MMHG | BODY MASS INDEX: 28.99 KG/M2 | HEIGHT: 65 IN

## 2021-05-25 DIAGNOSIS — D83.9 IMMUNODEFICIENCY, COMMON VARIABLE: ICD-10-CM

## 2021-05-25 DIAGNOSIS — M05.711 RHEUMATOID ARTHRITIS INVOLVING BOTH SHOULDERS WITH POSITIVE RHEUMATOID FACTOR: ICD-10-CM

## 2021-05-25 DIAGNOSIS — G47.26 SHIFT WORK SLEEP DISORDER: ICD-10-CM

## 2021-05-25 DIAGNOSIS — R63.5 WEIGHT GAIN: ICD-10-CM

## 2021-05-25 DIAGNOSIS — M02.30 REACTIVE ARTHRITIS: ICD-10-CM

## 2021-05-25 DIAGNOSIS — M99.01 CERVICAL (NECK) REGION SOMATIC DYSFUNCTION: ICD-10-CM

## 2021-05-25 DIAGNOSIS — K21.00 GASTROESOPHAGEAL REFLUX DISEASE WITH ESOPHAGITIS WITHOUT HEMORRHAGE: ICD-10-CM

## 2021-05-25 DIAGNOSIS — M47.25 OSTEOARTHRITIS OF SPINE WITH RADICULOPATHY, THORACOLUMBAR REGION: ICD-10-CM

## 2021-05-25 DIAGNOSIS — J32.9 OTHER SINUSITIS, UNSPECIFIED CHRONICITY: ICD-10-CM

## 2021-05-25 DIAGNOSIS — M35.00 SJOGREN'S SYNDROME, WITH UNSPECIFIED ORGAN INVOLVEMENT: ICD-10-CM

## 2021-05-25 DIAGNOSIS — M05.712 RHEUMATOID ARTHRITIS INVOLVING BOTH SHOULDERS WITH POSITIVE RHEUMATOID FACTOR: ICD-10-CM

## 2021-05-25 DIAGNOSIS — D83.9 CVID (COMMON VARIABLE IMMUNODEFICIENCY): Primary | ICD-10-CM

## 2021-05-25 PROCEDURE — 99999 PR PBB SHADOW E&M-EST. PATIENT-LVL III: CPT | Mod: PBBFAC,,, | Performed by: INTERNAL MEDICINE

## 2021-05-25 PROCEDURE — 3008F PR BODY MASS INDEX (BMI) DOCUMENTED: ICD-10-PCS | Mod: CPTII,S$GLB,, | Performed by: INTERNAL MEDICINE

## 2021-05-25 PROCEDURE — 3008F BODY MASS INDEX DOCD: CPT | Mod: CPTII,S$GLB,, | Performed by: INTERNAL MEDICINE

## 2021-05-25 PROCEDURE — 96372 PR INJECTION,THERAP/PROPH/DIAG2ST, IM OR SUBCUT: ICD-10-PCS | Mod: S$GLB,,, | Performed by: INTERNAL MEDICINE

## 2021-05-25 PROCEDURE — 96372 THER/PROPH/DIAG INJ SC/IM: CPT | Mod: S$GLB,,, | Performed by: INTERNAL MEDICINE

## 2021-05-25 PROCEDURE — 99999 PR PBB SHADOW E&M-EST. PATIENT-LVL III: ICD-10-PCS | Mod: PBBFAC,,, | Performed by: INTERNAL MEDICINE

## 2021-05-25 PROCEDURE — 99215 PR OFFICE/OUTPT VISIT, EST, LEVL V, 40-54 MIN: ICD-10-PCS | Mod: 25,S$GLB,, | Performed by: INTERNAL MEDICINE

## 2021-05-25 PROCEDURE — 99215 OFFICE O/P EST HI 40 MIN: CPT | Mod: 25,S$GLB,, | Performed by: INTERNAL MEDICINE

## 2021-05-25 RX ORDER — ARMODAFINIL 250 MG/1
250 TABLET ORAL DAILY
Qty: 30 TABLET | Refills: 3 | Status: SHIPPED | OUTPATIENT
Start: 2021-05-25 | End: 2021-06-24

## 2021-05-25 RX ORDER — BUPROPION HYDROCHLORIDE 300 MG/1
300 TABLET ORAL EVERY MORNING
COMMUNITY
Start: 2021-03-01 | End: 2021-05-25

## 2021-05-25 RX ORDER — CYANOCOBALAMIN 1000 UG/ML
1000 INJECTION, SOLUTION INTRAMUSCULAR; SUBCUTANEOUS
Status: COMPLETED | OUTPATIENT
Start: 2021-05-25 | End: 2021-05-25

## 2021-05-25 RX ORDER — ONDANSETRON 4 MG/1
TABLET, FILM COATED ORAL
COMMUNITY
Start: 2020-11-20 | End: 2023-07-17

## 2021-05-25 RX ORDER — TRETINOIN 0.5 MG/G
CREAM TOPICAL
COMMUNITY
Start: 2020-11-19 | End: 2022-01-25

## 2021-05-25 RX ORDER — HYDROXYCHLOROQUINE SULFATE 200 MG/1
200 TABLET, FILM COATED ORAL 2 TIMES DAILY
COMMUNITY
Start: 2021-05-18 | End: 2021-05-25 | Stop reason: SDUPTHER

## 2021-05-25 RX ORDER — BUPROPION HYDROCHLORIDE 150 MG/1
150 TABLET ORAL EVERY MORNING
COMMUNITY
Start: 2021-03-01 | End: 2021-05-25

## 2021-05-25 RX ORDER — ESTRADIOL 2 MG/1
4 TABLET ORAL DAILY
Qty: 60 TABLET | Refills: 5 | Status: SHIPPED | OUTPATIENT
Start: 2021-05-25 | End: 2022-01-25 | Stop reason: SDUPTHER

## 2021-05-25 RX ORDER — NITROFURANTOIN 25; 75 MG/1; MG/1
100 CAPSULE ORAL 2 TIMES DAILY
Qty: 20 CAPSULE | Refills: 0 | Status: SHIPPED | OUTPATIENT
Start: 2021-05-25 | End: 2021-06-04

## 2021-05-25 RX ORDER — HYDROXYCHLOROQUINE SULFATE 200 MG/1
200 TABLET, FILM COATED ORAL 2 TIMES DAILY
Qty: 180 TABLET | Refills: 3 | Status: SHIPPED | OUTPATIENT
Start: 2021-05-25 | End: 2022-01-25 | Stop reason: SDUPTHER

## 2021-05-25 RX ADMIN — CYANOCOBALAMIN 1000 MCG: 1000 INJECTION, SOLUTION INTRAMUSCULAR; SUBCUTANEOUS at 12:05

## 2021-05-27 ENCOUNTER — TELEPHONE (OUTPATIENT)
Dept: RHEUMATOLOGY | Facility: CLINIC | Age: 56
End: 2021-05-27

## 2021-05-27 ENCOUNTER — DOCUMENTATION ONLY (OUTPATIENT)
Dept: RHEUMATOLOGY | Facility: CLINIC | Age: 56
End: 2021-05-27

## 2021-07-30 LAB
ANA PAT SER IF-IMP: ABNORMAL
ANA SER QL IF: POSITIVE
ANA TITR SER IF: ABNORMAL TITER
APPEARANCE UR: CLEAR
BILIRUB UR QL STRIP: NEGATIVE
COLOR UR: YELLOW
DSDNA AB SER-ACNC: 3 IU/ML
ENA SS-A AB SER IA-ACNC: NORMAL AI
ENA SS-B AB SER IA-ACNC: ABNORMAL AI
GLUCOSE UR QL STRIP: NEGATIVE
HGB UR QL STRIP: NEGATIVE
IGA SERPL-MCNC: 195 MG/DL (ref 47–310)
IGE SERPL-ACNC: 31 KU/L
IGG SERPL-MCNC: 770 MG/DL (ref 600–1640)
IGM SERPL-MCNC: 48 MG/DL (ref 50–300)
KETONES UR QL STRIP: NEGATIVE
LEUKOCYTE ESTERASE UR QL STRIP: NEGATIVE
NITRITE UR QL STRIP: NEGATIVE
PH UR STRIP: NORMAL [PH] (ref 5–8)
PROT UR QL STRIP: NEGATIVE
SP GR UR STRIP: 1.02 (ref 1–1.03)

## 2021-08-07 LAB
IGG SERPL-MCNC: 720 MG/DL (ref 600–1640)
IGG1 SER-MCNC: 382 MG/DL (ref 382–929)
IGG2 SER-MCNC: 263 MG/DL (ref 241–700)
IGG3 SER-MCNC: 74 MG/DL (ref 22–178)
IGG4 SER-MCNC: 11.5 MG/DL (ref 4–86)

## 2021-11-29 ENCOUNTER — PATIENT MESSAGE (OUTPATIENT)
Dept: RHEUMATOLOGY | Facility: CLINIC | Age: 56
End: 2021-11-29
Payer: COMMERCIAL

## 2022-01-25 ENCOUNTER — OFFICE VISIT (OUTPATIENT)
Dept: RHEUMATOLOGY | Facility: CLINIC | Age: 57
End: 2022-01-25
Payer: COMMERCIAL

## 2022-01-25 VITALS
HEIGHT: 65 IN | DIASTOLIC BLOOD PRESSURE: 75 MMHG | HEART RATE: 83 BPM | SYSTOLIC BLOOD PRESSURE: 138 MMHG | WEIGHT: 183.75 LBS | BODY MASS INDEX: 30.62 KG/M2

## 2022-01-25 DIAGNOSIS — M35.00 SJOGREN'S SYNDROME, WITH UNSPECIFIED ORGAN INVOLVEMENT: Primary | ICD-10-CM

## 2022-01-25 DIAGNOSIS — K21.00 GASTROESOPHAGEAL REFLUX DISEASE WITH ESOPHAGITIS WITHOUT HEMORRHAGE: ICD-10-CM

## 2022-01-25 DIAGNOSIS — L03.90 CELLULITIS, UNSPECIFIED CELLULITIS SITE: ICD-10-CM

## 2022-01-25 DIAGNOSIS — M05.711 RHEUMATOID ARTHRITIS INVOLVING BOTH SHOULDERS WITH POSITIVE RHEUMATOID FACTOR: ICD-10-CM

## 2022-01-25 DIAGNOSIS — D83.9 CVID (COMMON VARIABLE IMMUNODEFICIENCY): ICD-10-CM

## 2022-01-25 DIAGNOSIS — M05.712 RHEUMATOID ARTHRITIS INVOLVING BOTH SHOULDERS WITH POSITIVE RHEUMATOID FACTOR: ICD-10-CM

## 2022-01-25 DIAGNOSIS — M99.01 CERVICAL (NECK) REGION SOMATIC DYSFUNCTION: ICD-10-CM

## 2022-01-25 DIAGNOSIS — M47.25 OSTEOARTHRITIS OF SPINE WITH RADICULOPATHY, THORACOLUMBAR REGION: ICD-10-CM

## 2022-01-25 DIAGNOSIS — D83.9 IMMUNODEFICIENCY, COMMON VARIABLE: ICD-10-CM

## 2022-01-25 DIAGNOSIS — R63.5 WEIGHT GAIN: ICD-10-CM

## 2022-01-25 DIAGNOSIS — L70.0 ACNE VULGARIS: ICD-10-CM

## 2022-01-25 DIAGNOSIS — J32.9 OTHER SINUSITIS, UNSPECIFIED CHRONICITY: ICD-10-CM

## 2022-01-25 DIAGNOSIS — G47.26 SHIFT WORK SLEEP DISORDER: ICD-10-CM

## 2022-01-25 PROCEDURE — 1159F PR MEDICATION LIST DOCUMENTED IN MEDICAL RECORD: ICD-10-PCS | Mod: CPTII,S$GLB,, | Performed by: INTERNAL MEDICINE

## 2022-01-25 PROCEDURE — 99215 PR OFFICE/OUTPT VISIT, EST, LEVL V, 40-54 MIN: ICD-10-PCS | Mod: 25,S$GLB,, | Performed by: INTERNAL MEDICINE

## 2022-01-25 PROCEDURE — 99215 OFFICE O/P EST HI 40 MIN: CPT | Mod: 25,S$GLB,, | Performed by: INTERNAL MEDICINE

## 2022-01-25 PROCEDURE — 3078F DIAST BP <80 MM HG: CPT | Mod: CPTII,S$GLB,, | Performed by: INTERNAL MEDICINE

## 2022-01-25 PROCEDURE — 3078F PR MOST RECENT DIASTOLIC BLOOD PRESSURE < 80 MM HG: ICD-10-PCS | Mod: CPTII,S$GLB,, | Performed by: INTERNAL MEDICINE

## 2022-01-25 PROCEDURE — 3075F PR MOST RECENT SYSTOLIC BLOOD PRESS GE 130-139MM HG: ICD-10-PCS | Mod: CPTII,S$GLB,, | Performed by: INTERNAL MEDICINE

## 2022-01-25 PROCEDURE — 99999 PR PBB SHADOW E&M-EST. PATIENT-LVL V: CPT | Mod: PBBFAC,,, | Performed by: INTERNAL MEDICINE

## 2022-01-25 PROCEDURE — 1159F MED LIST DOCD IN RCRD: CPT | Mod: CPTII,S$GLB,, | Performed by: INTERNAL MEDICINE

## 2022-01-25 PROCEDURE — 96372 PR INJECTION,THERAP/PROPH/DIAG2ST, IM OR SUBCUT: ICD-10-PCS | Mod: S$GLB,,, | Performed by: INTERNAL MEDICINE

## 2022-01-25 PROCEDURE — 3075F SYST BP GE 130 - 139MM HG: CPT | Mod: CPTII,S$GLB,, | Performed by: INTERNAL MEDICINE

## 2022-01-25 PROCEDURE — 96372 THER/PROPH/DIAG INJ SC/IM: CPT | Mod: S$GLB,,, | Performed by: INTERNAL MEDICINE

## 2022-01-25 PROCEDURE — 3008F PR BODY MASS INDEX (BMI) DOCUMENTED: ICD-10-PCS | Mod: CPTII,S$GLB,, | Performed by: INTERNAL MEDICINE

## 2022-01-25 PROCEDURE — 3008F BODY MASS INDEX DOCD: CPT | Mod: CPTII,S$GLB,, | Performed by: INTERNAL MEDICINE

## 2022-01-25 PROCEDURE — 99999 PR PBB SHADOW E&M-EST. PATIENT-LVL V: ICD-10-PCS | Mod: PBBFAC,,, | Performed by: INTERNAL MEDICINE

## 2022-01-25 RX ORDER — DOXYCYCLINE 100 MG/1
100 CAPSULE ORAL DAILY
COMMUNITY
Start: 2021-10-26 | End: 2022-01-25

## 2022-01-25 RX ORDER — DOXYCYCLINE HYCLATE 100 MG
100 TABLET ORAL 2 TIMES DAILY
Qty: 20 TABLET | Refills: 0 | Status: SHIPPED | OUTPATIENT
Start: 2022-01-25 | End: 2022-02-04

## 2022-01-25 RX ORDER — NYSTATIN 100000 [USP'U]/ML
5 SUSPENSION ORAL 4 TIMES DAILY
COMMUNITY
Start: 2021-11-22

## 2022-01-25 RX ORDER — CYANOCOBALAMIN 1000 UG/ML
1000 INJECTION, SOLUTION INTRAMUSCULAR; SUBCUTANEOUS
Status: COMPLETED | OUTPATIENT
Start: 2022-01-25 | End: 2022-01-25

## 2022-01-25 RX ORDER — HYDROXYCHLOROQUINE SULFATE 200 MG/1
200 TABLET, FILM COATED ORAL 2 TIMES DAILY
Qty: 180 TABLET | Refills: 3 | Status: SHIPPED | OUTPATIENT
Start: 2022-01-25 | End: 2023-01-25

## 2022-01-25 RX ORDER — KETOROLAC TROMETHAMINE 30 MG/ML
60 INJECTION, SOLUTION INTRAMUSCULAR; INTRAVENOUS
Status: COMPLETED | OUTPATIENT
Start: 2022-01-25 | End: 2022-01-25

## 2022-01-25 RX ORDER — TRETINOIN 1 MG/G
CREAM TOPICAL NIGHTLY
Qty: 20 G | Refills: 3 | Status: SHIPPED | OUTPATIENT
Start: 2022-01-25 | End: 2022-02-24

## 2022-01-25 RX ORDER — VALACYCLOVIR HYDROCHLORIDE 1 G/1
1000 TABLET, FILM COATED ORAL 2 TIMES DAILY
Qty: 60 TABLET | Refills: 11 | Status: SHIPPED | OUTPATIENT
Start: 2022-01-25 | End: 2023-07-17

## 2022-01-25 RX ORDER — FLUCONAZOLE 150 MG/1
150 TABLET ORAL DAILY
Qty: 9 EACH | Refills: 3 | Status: SHIPPED | OUTPATIENT
Start: 2022-01-25 | End: 2022-02-01

## 2022-01-25 RX ORDER — PANTOPRAZOLE SODIUM 40 MG/1
40 TABLET, DELAYED RELEASE ORAL DAILY
Qty: 90 TABLET | Refills: 3 | Status: SHIPPED | OUTPATIENT
Start: 2022-01-25 | End: 2022-12-21

## 2022-01-25 RX ORDER — CLINDAMYCIN PHOSPHATE 10 UG/ML
LOTION TOPICAL 2 TIMES DAILY
COMMUNITY
Start: 2021-10-26 | End: 2023-07-17

## 2022-01-25 RX ORDER — ESTRADIOL 2 MG/1
4 TABLET ORAL DAILY
Qty: 60 TABLET | Refills: 5 | Status: SHIPPED | OUTPATIENT
Start: 2022-01-25 | End: 2023-07-17

## 2022-01-25 RX ADMIN — CYANOCOBALAMIN 1000 MCG: 1000 INJECTION, SOLUTION INTRAMUSCULAR; SUBCUTANEOUS at 10:01

## 2022-01-25 RX ADMIN — KETOROLAC TROMETHAMINE 60 MG: 30 INJECTION, SOLUTION INTRAMUSCULAR; INTRAVENOUS at 10:01

## 2022-01-25 ASSESSMENT — ROUTINE ASSESSMENT OF PATIENT INDEX DATA (RAPID3)
FATIGUE SCORE: 0
PATIENT GLOBAL ASSESSMENT SCORE: 1.5
TOTAL RAPID3 SCORE: 3.17
PSYCHOLOGICAL DISTRESS SCORE: 0
PAIN SCORE: 5
MDHAQ FUNCTION SCORE: 0.9

## 2022-01-25 NOTE — PROGRESS NOTES
Administered 1 cc ( 1000 mcg/ml ) of b12 to the right upper outer gluteal. Informed of s/s to report verbalized understanding. No adverse reactions noted.        Administered 2 cc ( 30 mg/ml ) of toradol to the left upper outer gluteal. Informed of s/s to report verbalized understanding. No adverse reactions noted.

## 2022-01-25 NOTE — PROGRESS NOTES
Subjective:       Patient ID: Crystal Hein is a 56 y.o. female.    Chief Complaint: Disease Management    F/u: she has  ra and sjogren's on plaquenil,she had Covid 12/18/2020 she is still very fatigue, she started with candida,she tried the hormone pellets she had a rash stopped pellets.she had raised lesion on her face and vaginal area.  Pain is located in multiple joints, both shoulder(s), both elbow(s), both wrist(s), both MCP(s): 1st, 2nd, 3rd, 4th and 5th, both PIP(s): 1st, 2nd, 3rd, 4th and 5th, both DIP(s): 1st and 2nd, both hip(s), both knee(s) and both MTP(s): 1st, 2nd, 3rd, 4th and 5th, is described as aching, pulsating, shooting and throbbing, and is constant, moderate . Severe neck pain she has a mass over her C spine, decrease rom upper movement and stays inflammed.            She complains of joint swelling. Associated symptoms include fatigue.         Review of Systems   Constitutional: Positive for chills and fatigue. Negative for activity change, appetite change, diaphoresis and unexpected weight change.   HENT: Negative for congestion, dental problem, ear discharge, ear pain, facial swelling, mouth sores, nosebleeds, postnasal drip, rhinorrhea, sinus pressure, sneezing, sore throat, tinnitus and voice change.    Eyes: Negative for photophobia, pain, discharge, redness and itching.   Respiratory: Negative for apnea, cough, chest tightness, shortness of breath and wheezing.    Cardiovascular: Positive for leg swelling. Negative for chest pain and palpitations.   Gastrointestinal: Positive for abdominal pain. Negative for abdominal distention, constipation, diarrhea, nausea and vomiting.   Endocrine: Negative for cold intolerance, heat intolerance, polydipsia and polyuria.   Genitourinary: Negative for decreased urine volume, difficulty urinating, flank pain, frequency, hematuria and urgency.   Musculoskeletal: Positive for arthralgias, back pain, joint swelling, neck pain and neck stiffness.  "Negative for gait problem.   Skin: Negative for pallor, rash and wound.   Allergic/Immunologic: Negative for immunocompromised state.   Neurological: Positive for weakness. Negative for dizziness, tremors and numbness.   Hematological: Negative for adenopathy. Does not bruise/bleed easily.   Psychiatric/Behavioral: Negative for sleep disturbance. The patient is not nervous/anxious.          Objective:     /75   Pulse 83   Ht 5' 5" (1.651 m)   Wt 83.3 kg (183 lb 12.1 oz)   BMI 30.58 kg/m²      Physical Exam   Constitutional: She is oriented to person, place, and time. No distress.   HENT:   Head: Normocephalic and atraumatic.   Mouth/Throat: Oropharynx is clear and moist.   Eyes: Pupils are equal, round, and reactive to light.   Neck: No thyromegaly present.   Cardiovascular: Normal rate, regular rhythm and normal heart sounds. Exam reveals no gallop and no friction rub.   No murmur heard.  Pulmonary/Chest: Breath sounds normal. She has no wheezes. She has no rales. She exhibits no tenderness.   Abdominal: There is no abdominal tenderness. There is no rebound and no guarding.   Musculoskeletal:         General: Tenderness and deformity present.      Right shoulder: Tenderness present.      Left shoulder: Tenderness present.      Right elbow: Normal.      Left elbow: Normal.      Right wrist: Tenderness present.      Left wrist: Tenderness present.      Cervical back: Neck supple.      Right knee: No effusion. Tenderness present.      Left knee: No effusion. Tenderness present.   Lymphadenopathy:     She has no cervical adenopathy.   Neurological: She is alert and oriented to person, place, and time. Gait normal.   Skin: No rash noted. No erythema. No pallor.   Psychiatric: Mood, affect and judgment normal.   Nursing note and vitals reviewed.      Right Side Rheumatological Exam     Examination finds the elbow normal.    The patient is tender to palpation of the shoulder, wrist, knee, 1st PIP, 1st MCP, 2nd " PIP, 2nd MCP, 3rd PIP, 3rd MCP, 4th PIP, 4th MCP, 5th PIP and 5th MCP    She has swelling of the 1st PIP, 1st MCP, 2nd PIP, 2nd MCP, 3rd PIP, 3rd MCP, 4th PIP, 4th MCP, 5th PIP and 5th MCP    Shoulder Exam   Tenderness Location: no tenderness    Range of Motion   Active abduction: abnormal   Adduction: abnormal  Sensation: normal    Knee Exam   Patellofemoral Crepitus: positive  Effusion: negative  Sensation: normal    Hip Exam   Tenderness Location: posterior  Sensation: normal    Elbow/Wrist Exam   Tenderness Location: no tenderness  Sensation: normal    Left Side Rheumatological Exam     Examination finds the elbow normal.    The patient is tender to palpation of the shoulder, wrist, knee, 1st PIP, 1st MCP, 2nd PIP, 2nd MCP, 3rd PIP, 3rd MCP, 4th PIP, 4th MCP, 5th PIP and 5th MCP.    She has swelling of the 1st PIP, 1st MCP, 2nd PIP, 2nd MCP, 3rd PIP, 3rd MCP, 4th PIP, 4th MCP, 5th PIP and 5th MCP    Shoulder Exam   Tenderness Location: no tenderness    Range of Motion   Active abduction: abnormal   Sensation: normal    Knee Exam     Patellofemoral Crepitus: positive  Effusion: negative  Sensation: normal    Hip Exam   Tenderness Location: posterior  Sensation: normal    Elbow/Wrist Exam   Sensation: normal      Back/Neck Exam   General Inspection   Gait: normal                 Results for orders placed or performed in visit on 05/25/21   Urinalysis   Result Value Ref Range    Color, UA YELLOW YELLOW    Appearance, UA CLEAR CLEAR    Specific Gravity, UA 1.023 1.001 - 1.035    pH, UA < OR = 5.0 5.0 - 8.0    Glucose, UA NEGATIVE NEGATIVE    Bilirubin, UA NEGATIVE NEGATIVE    Ketones, UA NEGATIVE NEGATIVE    Occult Blood UA NEGATIVE NEGATIVE    Protein, UA NEGATIVE NEGATIVE    Nitrite, UA NEGATIVE NEGATIVE    Leukocytes, UA NEGATIVE NEGATIVE   Sjogrens syndrome-B extractable nuclear antibody   Result Value Ref Range    Anti-SSB Antibody 1.5 POS (A) <1.0 NEG AI   Sjogrens syndrome-A extractable nuclear antibody    Result Value Ref Range    Anti-SSA Antibody <1.0 NEG <1.0 NEG AI   Anti-DNA Ab, Double-Stranded   Result Value Ref Range    ds DNA Ab 3 IU/mL   SALEEM Screen w/Reflex   Result Value Ref Range    SALEEM POSITIVE (A) NEGATIVE   IgA   Result Value Ref Range    IgA 195 47 - 310 mg/dL   IgE   Result Value Ref Range    Immunoglobulin E 31 <GW=398 kU/L   IgG   Result Value Ref Range    IgG, Serum 770 600 - 1,640 mg/dL   IgM   Result Value Ref Range    IgM 48 (L) 50 - 300 mg/dL   IgG 1, 2, 3, and 4   Result Value Ref Range    Immunoglobulin G Subclass 1 382 382 - 929 mg/dL    Immunoglobulin G Subclass 2 263 241 - 700 mg/dL    Immunoglobulin G Subclass 3 74 22 - 178 mg/dL    Immunoglobulin G Sublcass 4 11.5 4 - 86 mg/dL    Immunoglobulin G, Serum 720 600 - 1,640 mg/dL   Antinuclear antibodies, titer and pattern   Result Value Ref Range    SALEEM Titer 1:80 (H) titer    SALEEM Pattern Nuclear, Dense Fine Speckled (A)      reviewed labs with patient during this visit     Vit d 30, testosterone 14 tsh 1.83 b12- [id number]  Assessment:          Encounter Diagnoses   Name Primary?    Cellulitis, unspecified cellulitis site Yes    CVID (common variable immunodeficiency)     Sjogren's syndrome, with unspecified organ involvement     Immunodeficiency, common variable     Cervical (neck) region somatic dysfunction          Plan:     [place] was seen today for disease management.    Diagnoses and all orders for this visit:    Cellulitis, unspecified cellulitis site  -     doxycycline (VIBRA-TABS) 100 MG tablet; Take 1 tablet (100 mg total) by mouth 2 (two) times daily. for 10 days  -     SALEEM Screen w/Reflex; Future  -     Anti Sm/RNP Antibody; Future  -     Anti-DNA Ab, Double-Stranded; Future  -     Anti-Scleroderma Antibody; Future  -     Anti-Thyroglobulin Antibody; Future  -     TSH; Future  -     Thyroid Peroxidase Antibody; Future  -     T4, Free; Future  -     Sjogrens syndrome-A extractable nuclear antibody; Future  -     Sjogrens  syndrome-B extractable nuclear antibody; Future  -     Sedimentation rate; Future  -     IgM; Future  -     Testosterone, Total, LC/MS/MS; Future  -     CBC Auto Differential; Future  -     Comprehensive Metabolic Panel; Future  -     C-Reactive Protein; Future  -     SALEEM Screen w/Reflex  -     Anti Sm/RNP Antibody  -     Anti-DNA Ab, Double-Stranded  -     Anti-Scleroderma Antibody  -     Anti-Thyroglobulin Antibody  -     TSH  -     Thyroid Peroxidase Antibody  -     T4, Free  -     Sjogrens syndrome-A extractable nuclear antibody  -     Sjogrens syndrome-B extractable nuclear antibody  -     Sedimentation rate  -     IgM  -     Testosterone, Total, LC/MS/MS  -     CBC Auto Differential  -     Comprehensive Metabolic Panel  -     C-Reactive Protein    CVID (common variable immunodeficiency)  -     SALEEM Screen w/Reflex; Future  -     Anti Sm/RNP Antibody; Future  -     Anti-DNA Ab, Double-Stranded; Future  -     Anti-Scleroderma Antibody; Future  -     Anti-Thyroglobulin Antibody; Future  -     TSH; Future  -     Thyroid Peroxidase Antibody; Future  -     T4, Free; Future  -     Sjogrens syndrome-A extractable nuclear antibody; Future  -     Sjogrens syndrome-B extractable nuclear antibody; Future  -     Sedimentation rate; Future  -     IgM; Future  -     Testosterone, Total, LC/MS/MS; Future  -     CBC Auto Differential; Future  -     Comprehensive Metabolic Panel; Future  -     C-Reactive Protein; Future  -     ketorolac injection 60 mg  -     cyanocobalamin injection 1,000 mcg  -     SALEEM Screen w/Reflex  -     Anti Sm/RNP Antibody  -     Anti-DNA Ab, Double-Stranded  -     Anti-Scleroderma Antibody  -     Anti-Thyroglobulin Antibody  -     TSH  -     Thyroid Peroxidase Antibody  -     T4, Free  -     Sjogrens syndrome-A extractable nuclear antibody  -     Sjogrens syndrome-B extractable nuclear antibody  -     Sedimentation rate  -     IgM  -     Testosterone, Total, LC/MS/MS  -     CBC Auto Differential  -      Comprehensive Metabolic Panel  -     C-Reactive Protein  -     Ambulatory referral/consult to Plastic Surgery; Future    Sjogren's syndrome, with unspecified organ involvement  -     SALEEM Screen w/Reflex; Future  -     Anti Sm/RNP Antibody; Future  -     Anti-DNA Ab, Double-Stranded; Future  -     Anti-Scleroderma Antibody; Future  -     Anti-Thyroglobulin Antibody; Future  -     TSH; Future  -     Thyroid Peroxidase Antibody; Future  -     T4, Free; Future  -     Sjogrens syndrome-A extractable nuclear antibody; Future  -     Sjogrens syndrome-B extractable nuclear antibody; Future  -     Sedimentation rate; Future  -     IgM; Future  -     Testosterone, Total, LC/MS/MS; Future  -     CBC Auto Differential; Future  -     Comprehensive Metabolic Panel; Future  -     C-Reactive Protein; Future  -     ketorolac injection 60 mg  -     cyanocobalamin injection 1,000 mcg  -     SALEEM Screen w/Reflex  -     Anti Sm/RNP Antibody  -     Anti-DNA Ab, Double-Stranded  -     Anti-Scleroderma Antibody  -     Anti-Thyroglobulin Antibody  -     TSH  -     Thyroid Peroxidase Antibody  -     T4, Free  -     Sjogrens syndrome-A extractable nuclear antibody  -     Sjogrens syndrome-B extractable nuclear antibody  -     Sedimentation rate  -     IgM  -     Testosterone, Total, LC/MS/MS  -     CBC Auto Differential  -     Comprehensive Metabolic Panel  -     C-Reactive Protein  -     Ambulatory referral/consult to Plastic Surgery; Future    Immunodeficiency, common variable  -     SALEEM Screen w/Reflex; Future  -     Anti Sm/RNP Antibody; Future  -     Anti-DNA Ab, Double-Stranded; Future  -     Anti-Scleroderma Antibody; Future  -     Anti-Thyroglobulin Antibody; Future  -     TSH; Future  -     Thyroid Peroxidase Antibody; Future  -     T4, Free; Future  -     Sjogrens syndrome-A extractable nuclear antibody; Future  -     Sjogrens syndrome-B extractable nuclear antibody; Future  -     Sedimentation rate; Future  -     IgM; Future  -      Testosterone, Total, LC/MS/MS; Future  -     CBC Auto Differential; Future  -     Comprehensive Metabolic Panel; Future  -     C-Reactive Protein; Future  -     ketorolac injection 60 mg  -     cyanocobalamin injection 1,000 mcg  -     SALEEM Screen w/Reflex  -     Anti Sm/RNP Antibody  -     Anti-DNA Ab, Double-Stranded  -     Anti-Scleroderma Antibody  -     Anti-Thyroglobulin Antibody  -     TSH  -     Thyroid Peroxidase Antibody  -     T4, Free  -     Sjogrens syndrome-A extractable nuclear antibody  -     Sjogrens syndrome-B extractable nuclear antibody  -     Sedimentation rate  -     IgM  -     Testosterone, Total, LC/MS/MS  -     CBC Auto Differential  -     Comprehensive Metabolic Panel  -     C-Reactive Protein  -     Ambulatory referral/consult to Plastic Surgery; Future    Cervical (neck) region somatic dysfunction  -     SALEEM Screen w/Reflex; Future  -     Anti Sm/RNP Antibody; Future  -     Anti-DNA Ab, Double-Stranded; Future  -     Anti-Scleroderma Antibody; Future  -     Anti-Thyroglobulin Antibody; Future  -     TSH; Future  -     Thyroid Peroxidase Antibody; Future  -     T4, Free; Future  -     Sjogrens syndrome-A extractable nuclear antibody; Future  -     Sjogrens syndrome-B extractable nuclear antibody; Future  -     Sedimentation rate; Future  -     IgM; Future  -     Testosterone, Total, LC/MS/MS; Future  -     CBC Auto Differential; Future  -     Comprehensive Metabolic Panel; Future  -     C-Reactive Protein; Future  -     ketorolac injection 60 mg  -     cyanocobalamin injection 1,000 mcg  -     SALEEM Screen w/Reflex  -     Anti Sm/RNP Antibody  -     Anti-DNA Ab, Double-Stranded  -     Anti-Scleroderma Antibody  -     Anti-Thyroglobulin Antibody  -     TSH  -     Thyroid Peroxidase Antibody  -     T4, Free  -     Sjogrens syndrome-A extractable nuclear antibody  -     Sjogrens syndrome-B extractable nuclear antibody  -     Sedimentation rate  -     IgM  -     Testosterone, Total, LC/MS/MS  -      CBC Auto Differential  -     Comprehensive Metabolic Panel  -     C-Reactive Protein  -     Ambulatory referral/consult to Plastic Surgery; Future       More than 50% of the  40 minute encounter was spent face to face counseling the patient regarding current status and future plan of care as well as side of the medications. All questions were answered to patient's satisfaction

## 2022-01-28 LAB
ALBUMIN SERPL-MCNC: 4 G/DL (ref 3.6–5.1)
ALBUMIN/GLOB SERPL: 1.9 (CALC) (ref 1–2.5)
ALP SERPL-CCNC: 50 U/L (ref 37–153)
ALT SERPL-CCNC: 15 U/L (ref 6–29)
ANA PAT SER IF-IMP: ABNORMAL
ANA SER QL IF: POSITIVE
ANA TITR SER IF: ABNORMAL TITER
AST SERPL-CCNC: 13 U/L (ref 10–35)
BASOPHILS # BLD AUTO: 41 CELLS/UL (ref 0–200)
BASOPHILS NFR BLD AUTO: 0.8 %
BILIRUB SERPL-MCNC: 0.4 MG/DL (ref 0.2–1.2)
BUN SERPL-MCNC: 20 MG/DL (ref 7–25)
BUN/CREAT SERPL: ABNORMAL (CALC) (ref 6–22)
CALCIUM SERPL-MCNC: 8.7 MG/DL (ref 8.6–10.4)
CHLORIDE SERPL-SCNC: 105 MMOL/L (ref 98–110)
CO2 SERPL-SCNC: 27 MMOL/L (ref 20–32)
CREAT SERPL-MCNC: 0.8 MG/DL (ref 0.5–1.05)
CRP SERPL-MCNC: 8.1 MG/L
DSDNA AB SER-ACNC: 3 IU/ML
ENA SCL70 AB SER IA-ACNC: NORMAL AI
ENA SM+RNP AB SER IA-ACNC: NORMAL AI
ENA SS-A AB SER IA-ACNC: ABNORMAL AI
ENA SS-B AB SER IA-ACNC: ABNORMAL AI
EOSINOPHIL # BLD AUTO: 122 CELLS/UL (ref 15–500)
EOSINOPHIL NFR BLD AUTO: 2.4 %
ERYTHROCYTE [DISTWIDTH] IN BLOOD BY AUTOMATED COUNT: 12 % (ref 11–15)
ERYTHROCYTE [SEDIMENTATION RATE] IN BLOOD BY WESTERGREN METHOD: 9 MM/H
GLOBULIN SER CALC-MCNC: 2.1 G/DL (CALC) (ref 1.9–3.7)
GLUCOSE SERPL-MCNC: 103 MG/DL (ref 65–99)
HCT VFR BLD AUTO: 33 % (ref 35–45)
HGB BLD-MCNC: 11 G/DL (ref 11.7–15.5)
IGM SERPL-MCNC: 40 MG/DL (ref 50–300)
LYMPHOCYTES # BLD AUTO: 1964 CELLS/UL (ref 850–3900)
LYMPHOCYTES NFR BLD AUTO: 38.5 %
MCH RBC QN AUTO: 30 PG (ref 27–33)
MCHC RBC AUTO-ENTMCNC: 33.3 G/DL (ref 32–36)
MCV RBC AUTO: 89.9 FL (ref 80–100)
MONOCYTES # BLD AUTO: 464 CELLS/UL (ref 200–950)
MONOCYTES NFR BLD AUTO: 9.1 %
NEUTROPHILS # BLD AUTO: 2509 CELLS/UL (ref 1500–7800)
NEUTROPHILS NFR BLD AUTO: 49.2 %
PLATELET # BLD AUTO: 227 THOUSAND/UL (ref 140–400)
PMV BLD REES-ECKER: 11.1 FL (ref 7.5–12.5)
POTASSIUM SERPL-SCNC: 4.5 MMOL/L (ref 3.5–5.3)
PROT SERPL-MCNC: 6.1 G/DL (ref 6.1–8.1)
RBC # BLD AUTO: 3.67 MILLION/UL (ref 3.8–5.1)
SODIUM SERPL-SCNC: 138 MMOL/L (ref 135–146)
T4 FREE SERPL-MCNC: 0.9 NG/DL (ref 0.8–1.8)
TESTOST SERPL-MCNC: 26 NG/DL (ref 2–45)
THYROGLOB AB SERPL-ACNC: <1 IU/ML
THYROPEROXIDASE AB SERPL-ACNC: <1 IU/ML
TSH SERPL-ACNC: 3.64 MIU/L (ref 0.4–4.5)
WBC # BLD AUTO: 5.1 THOUSAND/UL (ref 3.8–10.8)

## 2022-03-10 ENCOUNTER — PATIENT MESSAGE (OUTPATIENT)
Dept: RHEUMATOLOGY | Facility: CLINIC | Age: 57
End: 2022-03-10
Payer: COMMERCIAL

## 2022-05-23 ENCOUNTER — PATIENT MESSAGE (OUTPATIENT)
Dept: RHEUMATOLOGY | Facility: CLINIC | Age: 57
End: 2022-05-23
Payer: COMMERCIAL

## 2022-09-09 ENCOUNTER — TELEPHONE (OUTPATIENT)
Dept: RHEUMATOLOGY | Facility: CLINIC | Age: 57
End: 2022-09-09
Payer: COMMERCIAL

## 2022-09-09 DIAGNOSIS — E08.69 DIABETES MELLITUS DUE TO UNDERLYING CONDITION WITH OTHER SPECIFIED COMPLICATION, UNSPECIFIED WHETHER LONG TERM INSULIN USE: Primary | ICD-10-CM

## 2022-09-09 RX ORDER — SEMAGLUTIDE 1.34 MG/ML
0.25 INJECTION, SOLUTION SUBCUTANEOUS
Qty: 1 PEN | Refills: 5 | Status: SHIPPED | OUTPATIENT
Start: 2022-09-09 | End: 2023-04-19 | Stop reason: SDUPTHER

## 2022-09-09 RX ORDER — SEMAGLUTIDE 1.34 MG/ML
0.25 INJECTION, SOLUTION SUBCUTANEOUS
Qty: 1 PEN | Refills: 5 | Status: SHIPPED | OUTPATIENT
Start: 2022-09-09 | End: 2022-09-09 | Stop reason: SDUPTHER

## 2022-09-12 ENCOUNTER — PATIENT MESSAGE (OUTPATIENT)
Dept: RHEUMATOLOGY | Facility: CLINIC | Age: 57
End: 2022-09-12
Payer: COMMERCIAL

## 2022-09-12 DIAGNOSIS — M06.9 RHEUMATOID ARTHRITIS, UNSPECIFIED: ICD-10-CM

## 2022-09-12 DIAGNOSIS — R63.5 ABNORMAL WEIGHT GAIN: ICD-10-CM

## 2022-09-13 RX ORDER — MONTELUKAST SODIUM 10 MG/1
TABLET ORAL
Qty: 30 TABLET | Refills: 6 | Status: SHIPPED | OUTPATIENT
Start: 2022-09-13 | End: 2023-12-01

## 2022-09-19 ENCOUNTER — PATIENT MESSAGE (OUTPATIENT)
Dept: RHEUMATOLOGY | Facility: CLINIC | Age: 57
End: 2022-09-19
Payer: COMMERCIAL

## 2022-09-19 DIAGNOSIS — R63.5 WEIGHT GAIN: ICD-10-CM

## 2022-09-19 DIAGNOSIS — M35.00 SJOGREN'S SYNDROME, WITH UNSPECIFIED ORGAN INVOLVEMENT: ICD-10-CM

## 2022-09-19 DIAGNOSIS — D83.9 IMMUNODEFICIENCY, COMMON VARIABLE: ICD-10-CM

## 2022-09-19 DIAGNOSIS — M99.01 CERVICAL (NECK) REGION SOMATIC DYSFUNCTION: ICD-10-CM

## 2022-09-19 DIAGNOSIS — D83.9 CVID (COMMON VARIABLE IMMUNODEFICIENCY): Primary | ICD-10-CM

## 2022-09-30 ENCOUNTER — PATIENT MESSAGE (OUTPATIENT)
Dept: RHEUMATOLOGY | Facility: CLINIC | Age: 57
End: 2022-09-30
Payer: COMMERCIAL

## 2022-10-03 ENCOUNTER — OFFICE VISIT (OUTPATIENT)
Dept: RHEUMATOLOGY | Facility: CLINIC | Age: 57
End: 2022-10-03
Payer: COMMERCIAL

## 2022-10-03 VITALS
DIASTOLIC BLOOD PRESSURE: 76 MMHG | WEIGHT: 183.63 LBS | BODY MASS INDEX: 30.59 KG/M2 | HEART RATE: 82 BPM | SYSTOLIC BLOOD PRESSURE: 119 MMHG | HEIGHT: 65 IN

## 2022-10-03 DIAGNOSIS — M35.00 SJOGREN'S SYNDROME, WITH UNSPECIFIED ORGAN INVOLVEMENT: ICD-10-CM

## 2022-10-03 DIAGNOSIS — R60.9 EDEMA, UNSPECIFIED TYPE: ICD-10-CM

## 2022-10-03 DIAGNOSIS — E08.69 DIABETES MELLITUS DUE TO UNDERLYING CONDITION WITH OTHER SPECIFIED COMPLICATION, UNSPECIFIED WHETHER LONG TERM INSULIN USE: ICD-10-CM

## 2022-10-03 DIAGNOSIS — D83.9 CVID (COMMON VARIABLE IMMUNODEFICIENCY): Primary | ICD-10-CM

## 2022-10-03 DIAGNOSIS — L40.50 PSA (PSORIATIC ARTHRITIS): ICD-10-CM

## 2022-10-03 DIAGNOSIS — L40.9 PSORIASIS OF NAIL: ICD-10-CM

## 2022-10-03 DIAGNOSIS — D83.9 IMMUNODEFICIENCY, COMMON VARIABLE: ICD-10-CM

## 2022-10-03 DIAGNOSIS — M99.01 CERVICAL (NECK) REGION SOMATIC DYSFUNCTION: ICD-10-CM

## 2022-10-03 PROCEDURE — 1159F MED LIST DOCD IN RCRD: CPT | Mod: CPTII,S$GLB,, | Performed by: INTERNAL MEDICINE

## 2022-10-03 PROCEDURE — 3078F PR MOST RECENT DIASTOLIC BLOOD PRESSURE < 80 MM HG: ICD-10-PCS | Mod: CPTII,S$GLB,, | Performed by: INTERNAL MEDICINE

## 2022-10-03 PROCEDURE — 3078F DIAST BP <80 MM HG: CPT | Mod: CPTII,S$GLB,, | Performed by: INTERNAL MEDICINE

## 2022-10-03 PROCEDURE — 1159F PR MEDICATION LIST DOCUMENTED IN MEDICAL RECORD: ICD-10-PCS | Mod: CPTII,S$GLB,, | Performed by: INTERNAL MEDICINE

## 2022-10-03 PROCEDURE — 3074F PR MOST RECENT SYSTOLIC BLOOD PRESSURE < 130 MM HG: ICD-10-PCS | Mod: CPTII,S$GLB,, | Performed by: INTERNAL MEDICINE

## 2022-10-03 PROCEDURE — 99999 PR PBB SHADOW E&M-EST. PATIENT-LVL IV: ICD-10-PCS | Mod: PBBFAC,,, | Performed by: INTERNAL MEDICINE

## 2022-10-03 PROCEDURE — 99215 OFFICE O/P EST HI 40 MIN: CPT | Mod: S$GLB,,, | Performed by: INTERNAL MEDICINE

## 2022-10-03 PROCEDURE — 3074F SYST BP LT 130 MM HG: CPT | Mod: CPTII,S$GLB,, | Performed by: INTERNAL MEDICINE

## 2022-10-03 PROCEDURE — 99999 PR PBB SHADOW E&M-EST. PATIENT-LVL IV: CPT | Mod: PBBFAC,,, | Performed by: INTERNAL MEDICINE

## 2022-10-03 PROCEDURE — 99215 PR OFFICE/OUTPT VISIT, EST, LEVL V, 40-54 MIN: ICD-10-PCS | Mod: S$GLB,,, | Performed by: INTERNAL MEDICINE

## 2022-10-03 RX ORDER — APREMILAST 30 MG/1
30 TABLET, FILM COATED ORAL 2 TIMES DAILY
Qty: 60 TABLET | Refills: 11 | Status: SHIPPED | OUTPATIENT
Start: 2022-10-03 | End: 2022-10-18

## 2022-10-03 RX ORDER — APREMILAST 10-20-30MG
KIT ORAL
Qty: 55 TABLET | Refills: 0 | Status: SHIPPED | OUTPATIENT
Start: 2022-10-03 | End: 2022-10-04 | Stop reason: ALTCHOICE

## 2022-10-03 RX ORDER — FUROSEMIDE 40 MG/1
40 TABLET ORAL DAILY
Qty: 30 TABLET | Refills: 3 | Status: SHIPPED | OUTPATIENT
Start: 2022-10-03 | End: 2023-07-17

## 2022-10-03 RX ORDER — SEMAGLUTIDE 1.34 MG/ML
1 INJECTION, SOLUTION SUBCUTANEOUS
Qty: 1 PEN | Refills: 11 | Status: SHIPPED | OUTPATIENT
Start: 2022-10-03 | End: 2023-04-19

## 2022-10-03 RX ORDER — POTASSIUM CHLORIDE 750 MG/1
10 TABLET, EXTENDED RELEASE ORAL DAILY
Qty: 30 TABLET | Refills: 3 | Status: SHIPPED | OUTPATIENT
Start: 2022-10-03 | End: 2022-11-02

## 2022-10-03 ASSESSMENT — ROUTINE ASSESSMENT OF PATIENT INDEX DATA (RAPID3)
TOTAL RAPID3 SCORE: 4.44
PATIENT GLOBAL ASSESSMENT SCORE: 5.5
MDHAQ FUNCTION SCORE: 0.7
PSYCHOLOGICAL DISTRESS SCORE: 0
PAIN SCORE: 5.5
FATIGUE SCORE: 0

## 2022-10-03 NOTE — PROGRESS NOTES
Subjective:       Patient ID: Crystal Hein is a 56 y.o. female.    Chief Complaint: Disease Management    F/u: she has  ra and sjogren's on plaquenil, she started ozempic, she tried the hormone pellets she had a rash stopped pellets.Pain is located in multiple joints, both shoulder(s), both elbow(s), both wrist(s), both MCP(s): 1st, 2nd, 3rd, 4th and 5th, both PIP(s): 1st, 2nd, 3rd, 4th and 5th, both DIP(s): 1st and 2nd, both hip(s), both knee(s) and both MTP(s): 1st, 2nd, 3rd, 4th and 5th, is described as aching, pulsating, shooting and throbbing, and is constant, moderate . Severe neck pain she has a mass over her C spine, decrease rom upper movement and stays inflammed.    Review of Systems   Constitutional:  Positive for chills. Negative for activity change, appetite change, diaphoresis and unexpected weight change.   HENT:  Negative for congestion, dental problem, ear discharge, ear pain, facial swelling, mouth sores, nosebleeds, postnasal drip, rhinorrhea, sinus pressure, sneezing, sore throat, tinnitus and voice change.    Eyes:  Negative for photophobia, pain, discharge, redness and itching.   Respiratory:  Negative for apnea, cough, chest tightness, shortness of breath and wheezing.    Cardiovascular:  Positive for leg swelling. Negative for chest pain and palpitations.   Gastrointestinal:  Positive for abdominal pain. Negative for abdominal distention, constipation, diarrhea, nausea and vomiting.   Endocrine: Negative for cold intolerance, heat intolerance, polydipsia and polyuria.   Genitourinary:  Negative for decreased urine volume, difficulty urinating, flank pain, frequency, hematuria and urgency.   Musculoskeletal:  Positive for arthralgias, back pain, neck pain and neck stiffness. Negative for gait problem.   Skin:  Negative for pallor, rash and wound.   Allergic/Immunologic: Negative for immunocompromised state.   Neurological:  Positive for weakness. Negative for dizziness, tremors and  "numbness.   Hematological:  Negative for adenopathy. Does not bruise/bleed easily.   Psychiatric/Behavioral:  Negative for sleep disturbance. The patient is not nervous/anxious.        Objective:     /76   Pulse 82   Ht 5' 5" (1.651 m)   Wt 83.3 kg (183 lb 10.3 oz)   BMI 30.56 kg/m²      Physical Exam   Constitutional: She is oriented to person, place, and time. No distress.   HENT:   Head: Normocephalic and atraumatic.   Mouth/Throat: Oropharynx is clear and moist.   Eyes: Pupils are equal, round, and reactive to light.   Neck: No thyromegaly present.   Cardiovascular: Normal rate, regular rhythm and normal heart sounds. Exam reveals no gallop and no friction rub.   No murmur heard.  Pulmonary/Chest: Breath sounds normal. She has no wheezes. She has no rales. She exhibits no tenderness.   Abdominal: There is no abdominal tenderness. There is no rebound and no guarding.   Musculoskeletal:         General: Tenderness and deformity present.      Right shoulder: Tenderness present.      Left shoulder: Tenderness present.      Right elbow: Normal.      Left elbow: Normal.      Right wrist: Tenderness present.      Left wrist: Tenderness present.      Cervical back: Neck supple.      Right knee: No effusion. Tenderness present.      Left knee: No effusion. Tenderness present.   Lymphadenopathy:     She has no cervical adenopathy.   Neurological: She is alert and oriented to person, place, and time. Gait normal.   Skin: No rash noted. No erythema. No pallor.   Psychiatric: Mood, affect and judgment normal.   Nursing note and vitals reviewed.      Right Side Rheumatological Exam     Examination finds the elbow normal.    The patient is tender to palpation of the shoulder, wrist, knee, 1st PIP, 1st MCP, 2nd PIP, 2nd MCP, 3rd PIP, 3rd MCP, 4th PIP, 4th MCP, 5th PIP and 5th MCP    She has swelling of the 1st PIP, 1st MCP, 2nd PIP, 2nd MCP, 3rd PIP, 3rd MCP, 4th PIP, 4th MCP, 5th PIP and 5th MCP    Shoulder Exam "   Tenderness Location: no tenderness    Range of Motion   Active abduction:  abnormal   Adduction: abnormal  Sensation: normal    Knee Exam   Patellofemoral Crepitus: positive  Effusion: negative  Sensation: normal    Hip Exam   Tenderness Location: posterior  Sensation: normal    Elbow/Wrist Exam   Tenderness Location: no tenderness  Sensation: normal    Left Side Rheumatological Exam     Examination finds the elbow normal.    The patient is tender to palpation of the shoulder, wrist, knee, 1st PIP, 1st MCP, 2nd PIP, 2nd MCP, 3rd PIP, 3rd MCP, 4th PIP, 4th MCP, 5th PIP and 5th MCP.    She has swelling of the 1st PIP, 1st MCP, 2nd PIP, 2nd MCP, 3rd PIP, 3rd MCP, 4th PIP, 4th MCP, 5th PIP and 5th MCP    Shoulder Exam   Tenderness Location: no tenderness    Range of Motion   Active abduction:  abnormal   Sensation: normal    Knee Exam     Patellofemoral Crepitus: positive  Effusion: negative  Sensation: normal    Hip Exam   Tenderness Location: posterior  Sensation: normal    Elbow/Wrist Exam   Sensation: normal      Back/Neck Exam   General Inspection   Gait: normal             Results for orders placed or performed in visit on 09/30/22   PTH, INTACT   Result Value Ref Range    PTH, Intact 35 16 - 77 pg/mL   Calcium   Result Value Ref Range    Calcium 9.1 8.6 - 10.4 mg/dL   Comprehensive Metabolic Panel   Result Value Ref Range    Glucose 81 65 - 99 mg/dL    BUN 12 7 - 25 mg/dL    Creatinine 0.71 0.50 - 1.03 mg/dL    EGFR 100 > OR = 60 mL/min/1.73m2    BUN/Creatinine Ratio NOT APPLICABLE 6 - 22 (calc)    Sodium 139 135 - 146 mmol/L    Potassium 4.3 3.5 - 5.3 mmol/L    Chloride 106 98 - 110 mmol/L    CO2 27 20 - 32 mmol/L    Calcium 9.1 8.6 - 10.4 mg/dL    Total Protein 6.1 6.1 - 8.1 g/dL    Albumin 4.0 3.6 - 5.1 g/dL    Globulin, Total 2.1 1.9 - 3.7 g/dL (calc)    Albumin/Globulin Ratio 1.9 1.0 - 2.5 (calc)    Total Bilirubin 0.3 0.2 - 1.2 mg/dL    Alkaline Phosphatase 48 37 - 153 U/L    AST 13 10 - 35 U/L    ALT 13  6 - 29 U/L   Anti-Histone Antibody   Result Value Ref Range    Histone Ab     Sedimentation Rate   Result Value Ref Range    Sed Rate 6 < OR = 30 mm/h   SALEEM Screen w/Reflex   Result Value Ref Range    SALEEM     Sjorgen's Ab, Anti-SSA/SSB   Result Value Ref Range    Anti-SSA Antibody      Anti-SSB Antibody     C-Reactive Protein   Result Value Ref Range    CRP     T4, Free   Result Value Ref Range    T4, Free 1.1 0.8 - 1.8 ng/dL   TSH   Result Value Ref Range    TSH 2.37 0.40 - 4.50 mIU/L   Vitamin D   Result Value Ref Range    Vitamin D, 25-OH, Total 45 30 - 100 ng/mL     reviewed labs with patient during this visit       Assessment:          Encounter Diagnoses   Name Primary?    CVID (common variable immunodeficiency) Yes    Sjogren's syndrome, with unspecified organ involvement     Immunodeficiency, common variable     Cervical (neck) region somatic dysfunction     PSA (psoriatic arthritis)     Psoriasis of nail     Edema, unspecified type     Diabetes mellitus due to underlying condition with other specified complication, unspecified whether long term insulin use            Plan:     [place] was seen today for disease management.    Diagnoses and all orders for this visit:    CVID (common variable immunodeficiency)  -     CBC Auto Differential; Future  -     AMYLASE; Future  -     LIPASE; Future  -     Comprehensive Metabolic Panel; Future  -     CBC Auto Differential  -     AMYLASE  -     LIPASE  -     Comprehensive Metabolic Panel    Sjogren's syndrome, with unspecified organ involvement  -     CBC Auto Differential; Future  -     AMYLASE; Future  -     LIPASE; Future  -     Comprehensive Metabolic Panel; Future  -     CBC Auto Differential  -     AMYLASE  -     LIPASE  -     Comprehensive Metabolic Panel    Immunodeficiency, common variable  -     CBC Auto Differential; Future  -     AMYLASE; Future  -     LIPASE; Future  -     Comprehensive Metabolic Panel; Future  -     CBC Auto Differential  -      AMYLASE  -     LIPASE  -     Comprehensive Metabolic Panel    Cervical (neck) region somatic dysfunction  -     CBC Auto Differential; Future  -     AMYLASE; Future  -     LIPASE; Future  -     Comprehensive Metabolic Panel; Future  -     CBC Auto Differential  -     AMYLASE  -     LIPASE  -     Comprehensive Metabolic Panel    PSA (psoriatic arthritis)  -     apremilast (OTEZLA STARTER) 10 mg (4)-20 mg (4)-30 mg (47) DsPk; As directed  -     apremilast (OTEZLA) 30 mg Tab; Take 1 tablet (30 mg total) by mouth 2 (two) times daily.    Psoriasis of nail  -     apremilast (OTEZLA STARTER) 10 mg (4)-20 mg (4)-30 mg (47) DsPk; As directed  -     apremilast (OTEZLA) 30 mg Tab; Take 1 tablet (30 mg total) by mouth 2 (two) times daily.    Edema, unspecified type  -     furosemide (LASIX) 40 MG tablet; Take 1 tablet (40 mg total) by mouth once daily.  -     potassium chloride SA (K-DUR,KLOR-CON M) 10 MEQ tablet; Take 1 tablet (10 mEq total) by mouth once daily.    Diabetes mellitus due to underlying condition with other specified complication, unspecified whether long term insulin use  -     semaglutide (OZEMPIC) 1 mg/dose (4 mg/3 mL); Inject 1 mg into the skin every 7 days.       More than 50% of the  40 minute encounter was spent face to face counseling the patient regarding current status and future plan of care as well as side of the medications. All questions were answered to patient's satisfaction

## 2022-10-04 ENCOUNTER — SPECIALTY PHARMACY (OUTPATIENT)
Dept: PHARMACY | Facility: CLINIC | Age: 57
End: 2022-10-04
Payer: COMMERCIAL

## 2022-10-04 NOTE — TELEPHONE ENCOUNTER
Marvin, this is Luz Elena Gonzalez with [facility].  We are working on your prescription that your doctor has sent us. We will be working with your insurance to get this approved for you. We will be calling you along the way with updates on your medication.  If you have any questions, you can reach us at [phone].    Welcome call outcome: Patient/caregiver reached    Patient received starter pack from [facility]. Maintenance dose PA submitted via [facility] epic.     DOCUMENTATION ONLY:  Prior authorization for Otezla approved from 10/4/2022 to 10/4/2023    Case Id: [id number]    Co-pay: 150    BI complete. [facility] in network. Sending to FA for copay card.

## 2022-10-05 NOTE — TELEPHONE ENCOUNTER
Outgoing call regarding Otezla prescription. Calling to inform patient Otezla was approved by insurance and is eligible for a copay card. Patient provided consent, copay card secured and added to [facility].    ID: [id number]  BIN:[id number]   PCN:[id number]  GRP: [id number]    Patient responsibility: $0  Pending for initial consult

## 2022-10-06 LAB
25(OH)D3 SERPL-MCNC: 45 NG/ML (ref 30–100)
ALBUMIN SERPL-MCNC: 4 G/DL (ref 3.6–5.1)
ALBUMIN/GLOB SERPL: 1.9 (CALC) (ref 1–2.5)
ALP SERPL-CCNC: 48 U/L (ref 37–153)
ALT SERPL-CCNC: 13 U/L (ref 6–29)
ANA PAT SER IF-IMP: ABNORMAL
ANA SER QL IF: POSITIVE
ANA TITR SER IF: ABNORMAL TITER
AST SERPL-CCNC: 13 U/L (ref 10–35)
BILIRUB SERPL-MCNC: 0.3 MG/DL (ref 0.2–1.2)
BUN SERPL-MCNC: 12 MG/DL (ref 7–25)
BUN/CREAT SERPL: NORMAL (CALC) (ref 6–22)
CALCIUM SERPL-MCNC: 9.1 MG/DL (ref 8.6–10.4)
CALCIUM SERPL-MCNC: 9.1 MG/DL (ref 8.6–10.4)
CHLORIDE SERPL-SCNC: 106 MMOL/L (ref 98–110)
CO2 SERPL-SCNC: 27 MMOL/L (ref 20–32)
CREAT SERPL-MCNC: 0.71 MG/DL (ref 0.5–1.03)
CRP SERPL-MCNC: 4.3 MG/L
EGFR: 100 ML/MIN/1.73M2
ENA SS-A AB SER IA-ACNC: ABNORMAL AI
ENA SS-B AB SER IA-ACNC: ABNORMAL AI
ERYTHROCYTE [SEDIMENTATION RATE] IN BLOOD BY WESTERGREN METHOD: 6 MM/H
GLOBULIN SER CALC-MCNC: 2.1 G/DL (CALC) (ref 1.9–3.7)
GLUCOSE SERPL-MCNC: 81 MG/DL (ref 65–99)
HISTONE AB SER IA-ACNC: <1 U
POTASSIUM SERPL-SCNC: 4.3 MMOL/L (ref 3.5–5.3)
PROT SERPL-MCNC: 6.1 G/DL (ref 6.1–8.1)
PTH-INTACT SERPL-MCNC: 35 PG/ML (ref 16–77)
SODIUM SERPL-SCNC: 139 MMOL/L (ref 135–146)
T4 FREE SERPL-MCNC: 1.1 NG/DL (ref 0.8–1.8)
TSH SERPL-ACNC: 2.37 MIU/L (ref 0.4–4.5)

## 2022-10-07 ENCOUNTER — PATIENT MESSAGE (OUTPATIENT)
Dept: RHEUMATOLOGY | Facility: CLINIC | Age: 57
End: 2022-10-07
Payer: COMMERCIAL

## 2022-10-10 ENCOUNTER — PATIENT MESSAGE (OUTPATIENT)
Dept: RHEUMATOLOGY | Facility: CLINIC | Age: 57
End: 2022-10-10
Payer: COMMERCIAL

## 2022-10-17 ENCOUNTER — SPECIALTY PHARMACY (OUTPATIENT)
Dept: PHARMACY | Facility: CLINIC | Age: 57
End: 2022-10-17
Payer: COMMERCIAL

## 2022-10-17 NOTE — TELEPHONE ENCOUNTER
Patient declined initial consult. Unable to tolerate Otezla. States Dr Truong said she will be sending in a new prescription for another therapy. [facility] has not received anything. Staff message sent to MD. Patient will also need TB/Hep B for biologic therapy. Asked if MD can put in order for this as well. Will continue to follow up.

## 2022-10-18 ENCOUNTER — TELEPHONE (OUTPATIENT)
Dept: RHEUMATOLOGY | Facility: CLINIC | Age: 57
End: 2022-10-18

## 2022-10-18 DIAGNOSIS — L40.50 PSA (PSORIATIC ARTHRITIS): Primary | ICD-10-CM

## 2022-10-18 RX ORDER — GUSELKUMAB 100 MG/ML
INJECTION SUBCUTANEOUS
Qty: 2 ML | Refills: 0 | Status: SHIPPED | OUTPATIENT
Start: 2022-10-18 | End: 2023-06-01 | Stop reason: SDUPTHER

## 2022-10-18 RX ORDER — GUSELKUMAB 100 MG/ML
100 INJECTION SUBCUTANEOUS
Qty: 1 ML | Refills: 6 | Status: SHIPPED | OUTPATIENT
Start: 2022-10-18 | End: 2023-06-01 | Stop reason: SDUPTHER

## 2022-10-18 NOTE — TELEPHONE ENCOUNTER
Patient anticipated to switch therapies due to intolerable side effects. See associated I-Vent for Otezla.

## 2022-10-18 NOTE — TELEPHONE ENCOUNTER
----- Message from Luz Elena Gonzalez, PharmD sent at 10/17/2022  2:30 PM CDT -----  Regarding: New RA medication  Good afternoon,    Ms. Hein said that you were switching her to a different medication since she was not able to tolerate the Otezla. She said maybe Tremfya? We didn't receive anything yet, so I just wanted to check if you wanted to send over a prescription for a different therapy.     She also does not have  TB/Hep B labs on file. If she will be starting biologic therapy, can you put in an order for these labs?     Thanks,  Luz Elena Gonzalez, PharmD  Clinical Pharmacist  [facility]   Phone: [phone]

## 2022-10-19 ENCOUNTER — SPECIALTY PHARMACY (OUTPATIENT)
Dept: PHARMACY | Facility: CLINIC | Age: 57
End: 2022-10-19
Payer: COMMERCIAL

## 2022-10-19 NOTE — TELEPHONE ENCOUNTER
Marvin, this is Pradeep Terrazas with [facility].  We are working on your prescription that your doctor has sent us. We will be working with your insurance to get this approved for you. We will be calling you along the way with updates on your medication.  If you have any questions, you can reach us at [phone].    Welcome call outcome: Patient/caregiver reached    Notified patient that she would need to have labwork completed prior to starting Tremfya. Reaching out to provider's office to have labs ordered and scheduled for next week.

## 2022-10-20 ENCOUNTER — TELEPHONE (OUTPATIENT)
Dept: RHEUMATOLOGY | Facility: CLINIC | Age: 57
End: 2022-10-20
Payer: COMMERCIAL

## 2022-10-20 NOTE — TELEPHONE ENCOUNTER
----- Message from Pradeep Terrazas PharmD sent at 10/19/2022  2:56 PM CDT -----  Regarding: Tremfya Labs  Good afternoon,     May someone please reach out to the patient to schedule her labs for Tremfya? I spoke with her and stated that she is going out of town, so sometime next week would work for her.     Let me know if you need anything on my end in the meantime.     Thank you.

## 2022-10-24 NOTE — TELEPHONE ENCOUNTER
Spoke to patient and informed of labs needed for tremya approval. [facility] and [facility].

## 2022-10-27 LAB
GAMMA INTERFERON BACKGROUND BLD IA-ACNC: 0.08 IU/ML
HBV CORE AB SERPL QL IA: NORMAL
HBV SURFACE AB SER QL IA: NORMAL
HBV SURFACE AG SERPL QL IA: NORMAL
HCV AB S/CO SERPL IA: 0.04
HCV AB SERPL QL IA: NORMAL
M TB IFN-G BLD-IMP: NEGATIVE
M TB IFN-G CD4+ BCKGRND COR BLD-ACNC: 0.02 IU/ML
M TB IFN-G CD4+CD8+ BCKGRND COR BLD-ACNC: 0.03 IU/ML
MITOGEN IGNF BCKGRD COR BLD-ACNC: >10 IU/ML

## 2022-10-27 NOTE — TELEPHONE ENCOUNTER
Outgoing call to patient to offer Tremfya copay card assistance. Patient provided consent and copay card secured and entered into [employer].     BIN: [id number]  GRP: [id number]  ID: [id number]    Pending to initial.

## 2022-10-27 NOTE — TELEPHONE ENCOUNTER
-PA approved from 10/27/2022 to 10/27/2023  Case ID: [id number]    Benefits Investigation -Abdirizakya     Insurance name: [employer]   Rep name: [facility]     Copay amount: $150  Deductible: $0  OOPmax: $8500  [facility] in network? Y  Tier level (if applicable): N/A     Cost exceeds max override confirmation #: [id number]. Approved from 10/13/2022-10/13/2023    Forwarding to LAURIE

## 2022-11-02 ENCOUNTER — SPECIALTY PHARMACY (OUTPATIENT)
Dept: PHARMACY | Facility: CLINIC | Age: 57
End: 2022-11-02
Payer: COMMERCIAL

## 2022-11-02 NOTE — TELEPHONE ENCOUNTER
Outgoing call to patient for Tremfya initial. Patient requested a callback this afternoon around 1:30-2:00 PM.

## 2022-11-08 NOTE — TELEPHONE ENCOUNTER
We have had multiple, unsuccessful attempts to reach the patient to fill Tremfya. At this time, we will stop reaching out to the patient. In the event that the patient is able to contact us to fill the medication, we will send communication to your office and re-enroll the patient in our pharmacy services. At your next visit, please review the importance of being in contact with our specialty pharmacy as a part of our care team.

## 2022-12-19 DIAGNOSIS — M35.00 SJOGREN'S SYNDROME, WITH UNSPECIFIED ORGAN INVOLVEMENT: ICD-10-CM

## 2022-12-19 DIAGNOSIS — R63.5 WEIGHT GAIN: ICD-10-CM

## 2022-12-19 DIAGNOSIS — D83.9 CVID (COMMON VARIABLE IMMUNODEFICIENCY): ICD-10-CM

## 2022-12-19 DIAGNOSIS — K21.00 GASTROESOPHAGEAL REFLUX DISEASE WITH ESOPHAGITIS: ICD-10-CM

## 2022-12-19 DIAGNOSIS — M99.01 CERVICAL (NECK) REGION SOMATIC DYSFUNCTION: ICD-10-CM

## 2022-12-19 DIAGNOSIS — K21.00 GASTROESOPHAGEAL REFLUX DISEASE WITH ESOPHAGITIS WITHOUT HEMORRHAGE: ICD-10-CM

## 2022-12-19 DIAGNOSIS — M06.9 RHEUMATOID ARTHRITIS: ICD-10-CM

## 2022-12-19 DIAGNOSIS — D83.9 IMMUNODEFICIENCY, COMMON VARIABLE: ICD-10-CM

## 2022-12-19 DIAGNOSIS — M02.30 REACTIVE ARTHRITIS: ICD-10-CM

## 2022-12-21 RX ORDER — PANTOPRAZOLE SODIUM 40 MG/1
TABLET, DELAYED RELEASE ORAL
Qty: 90 TABLET | Refills: 3 | Status: SHIPPED | OUTPATIENT
Start: 2022-12-21

## 2022-12-21 RX ORDER — MECLIZINE HYDROCHLORIDE 25 MG/1
TABLET ORAL
Qty: 90 TABLET | Refills: 3 | Status: SHIPPED | OUTPATIENT
Start: 2022-12-21

## 2022-12-21 RX ORDER — KETOROLAC TROMETHAMINE 10 MG/1
TABLET, FILM COATED ORAL
Qty: 20 TABLET | Refills: 3 | Status: SHIPPED | OUTPATIENT
Start: 2022-12-21

## 2023-01-05 DIAGNOSIS — K21.9 GASTROESOPHAGEAL REFLUX DISEASE: ICD-10-CM

## 2023-01-08 RX ORDER — LANSOPRAZOLE 30 MG/1
CAPSULE, DELAYED RELEASE ORAL
Qty: 90 CAPSULE | Refills: 3 | Status: SHIPPED | OUTPATIENT
Start: 2023-01-08

## 2023-02-02 ENCOUNTER — OFFICE VISIT (OUTPATIENT)
Dept: RHEUMATOLOGY | Facility: CLINIC | Age: 58
End: 2023-02-02
Payer: COMMERCIAL

## 2023-02-02 DIAGNOSIS — D83.9 CVID (COMMON VARIABLE IMMUNODEFICIENCY): ICD-10-CM

## 2023-02-02 DIAGNOSIS — L40.50 PSA (PSORIATIC ARTHRITIS): ICD-10-CM

## 2023-02-02 DIAGNOSIS — D83.9 IMMUNODEFICIENCY, COMMON VARIABLE: ICD-10-CM

## 2023-02-02 DIAGNOSIS — M35.00 SJOGREN'S SYNDROME, WITH UNSPECIFIED ORGAN INVOLVEMENT: ICD-10-CM

## 2023-02-02 DIAGNOSIS — N30.01 ACUTE CYSTITIS WITH HEMATURIA: Primary | ICD-10-CM

## 2023-02-02 PROCEDURE — 1159F PR MEDICATION LIST DOCUMENTED IN MEDICAL RECORD: ICD-10-PCS | Mod: CPTII,95,, | Performed by: INTERNAL MEDICINE

## 2023-02-02 PROCEDURE — 99215 PR OFFICE/OUTPT VISIT, EST, LEVL V, 40-54 MIN: ICD-10-PCS | Mod: 95,,, | Performed by: INTERNAL MEDICINE

## 2023-02-02 PROCEDURE — 1159F MED LIST DOCD IN RCRD: CPT | Mod: CPTII,95,, | Performed by: INTERNAL MEDICINE

## 2023-02-02 PROCEDURE — 99215 OFFICE O/P EST HI 40 MIN: CPT | Mod: 95,,, | Performed by: INTERNAL MEDICINE

## 2023-02-02 PROCEDURE — 1160F RVW MEDS BY RX/DR IN RCRD: CPT | Mod: CPTII,95,, | Performed by: INTERNAL MEDICINE

## 2023-02-02 PROCEDURE — 1160F PR REVIEW ALL MEDS BY PRESCRIBER/CLIN PHARMACIST DOCUMENTED: ICD-10-PCS | Mod: CPTII,95,, | Performed by: INTERNAL MEDICINE

## 2023-02-03 RX ORDER — NITROFURANTOIN 25; 75 MG/1; MG/1
100 CAPSULE ORAL 2 TIMES DAILY
Qty: 20 CAPSULE | Refills: 0 | Status: SHIPPED | OUTPATIENT
Start: 2023-02-03 | End: 2023-02-13

## 2023-02-03 RX ORDER — FLUCONAZOLE 150 MG/1
150 TABLET ORAL DAILY
Qty: 7 TABLET | Refills: 3 | Status: SHIPPED | OUTPATIENT
Start: 2023-02-03 | End: 2023-02-10

## 2023-02-03 NOTE — PROGRESS NOTES
Subjective:       Patient ID: Crystal Hein is a 57 y.o. female.    Chief Complaint: No chief complaint on file.      F/u: she has  ra and sjogren's on plaquenil, she on ozempic blood  glucose is normal, pt had pancreatitis and uti. She was severe vertigo with she tried the hormone pellets she had a rash stopped pellets.Pain is located in multiple joints, both shoulder(s), both elbow(s), both wrist(s), both MCP(s): 1st, 2nd, 3rd, 4th and 5th, both PIP(s): 1st, 2nd, 3rd, 4th and 5th, both DIP(s): 1st and 2nd, both hip(s), both knee(s) and both MTP(s): 1st, 2nd, 3rd, 4th and 5th, is described as aching, pulsating, shooting and throbbing, and is constant, moderate . Severe neck pain she has a mass over her C spine, decrease rom upper movement and stays inflammed.    Review of Systems   Constitutional:  Positive for chills. Negative for activity change, appetite change, diaphoresis, fever and unexpected weight change.   HENT:  Negative for congestion, dental problem, ear discharge, ear pain, facial swelling, mouth sores, nosebleeds, postnasal drip, rhinorrhea, sinus pressure, sneezing, sore throat, tinnitus, trouble swallowing and voice change.    Eyes:  Negative for photophobia, pain, discharge, redness and itching.   Respiratory:  Negative for apnea, cough, chest tightness, shortness of breath and wheezing.    Cardiovascular:  Positive for leg swelling. Negative for chest pain and palpitations.   Gastrointestinal:  Positive for abdominal pain. Negative for abdominal distention, constipation, diarrhea, nausea and vomiting.   Endocrine: Negative for cold intolerance, heat intolerance, polydipsia and polyuria.   Genitourinary:  Negative for decreased urine volume, difficulty urinating, dysuria, flank pain, frequency, genital sores, hematuria and urgency.   Musculoskeletal:  Positive for arthralgias, back pain, neck pain and neck stiffness. Negative for gait problem.   Skin:  Negative for pallor, rash and wound.    Allergic/Immunologic: Negative for immunocompromised state.   Neurological:  Positive for weakness. Negative for dizziness, tremors, numbness and headaches.   Hematological:  Negative for adenopathy. Does not bruise/bleed easily.   Psychiatric/Behavioral:  Negative for sleep disturbance. The patient is not nervous/anxious.        Objective:     There were no vitals taken for this visit.     Physical Exam   Constitutional: She is oriented to person, place, and time.   Neurological: She is alert and oriented to person, place, and time.   Psychiatric: Mood, affect and judgment normal.         Results for orders placed or performed in visit on 10/18/22   HEPATITIS B SURFACE ANTIBODY   Result Value Ref Range    Hepatitis B Surface Ab QL NON-REACTIVE NON-REACTIVE   Hepatitis B Core Antibody, Total   Result Value Ref Range    Hep B Core Total Ab NON-REACTIVE NON-REACTIVE   Hepatitis B Surface Antigen   Result Value Ref Range    Hepatitis B Surface Ag NON-REACTIVE NON-REACTIVE   Hepatitis C Antibody   Result Value Ref Range    Hepatitis C Ab NON-REACTIVE NON-REACTIVE    Signal/Cutoff 0.04 <1.00   QuantiFERON-TB Gold Plus   Result Value Ref Range    QuantiFERON-TB Gold Plus NEGATIVE NEGATIVE    NIL 0.08 IU/mL    Mitogen - Nil >10.00 IU/mL    TB1 - Nil 0.02 IU/mL    TB2 - Nil 0.03 IU/mL     reviewed labs with patient during this visit       Assessment:          Encounter Diagnoses   Name Primary?    Acute cystitis with hematuria Yes    PSA (psoriatic arthritis)     CVID (common variable immunodeficiency)     Sjogren's syndrome, with unspecified organ involvement     Immunodeficiency, common variable              Plan:     Diagnoses and all orders for this visit:    Acute cystitis with hematuria  -     nitrofurantoin, macrocrystal-monohydrate, (MACROBID) 100 MG capsule; Take 1 capsule (100 mg total) by mouth 2 (two) times daily. for 10 days  -     fluconazole (DIFLUCAN) 150 MG Tab; Take 1 tablet (150 mg total) by mouth  once daily. for 7 days  -     AMYLASE; Future  -     LIPASE; Future  -     Urinalysis; Future  -     Gamma GT; Future  -     AMYLASE  -     LIPASE  -     Urinalysis  -     Gamma GT    PSA (psoriatic arthritis)  -     nitrofurantoin, macrocrystal-monohydrate, (MACROBID) 100 MG capsule; Take 1 capsule (100 mg total) by mouth 2 (two) times daily. for 10 days  -     fluconazole (DIFLUCAN) 150 MG Tab; Take 1 tablet (150 mg total) by mouth once daily. for 7 days  -     AMYLASE; Future  -     LIPASE; Future  -     Urinalysis; Future  -     Gamma GT; Future  -     AMYLASE  -     LIPASE  -     Urinalysis  -     Gamma GT    CVID (common variable immunodeficiency)  -     nitrofurantoin, macrocrystal-monohydrate, (MACROBID) 100 MG capsule; Take 1 capsule (100 mg total) by mouth 2 (two) times daily. for 10 days  -     fluconazole (DIFLUCAN) 150 MG Tab; Take 1 tablet (150 mg total) by mouth once daily. for 7 days  -     AMYLASE; Future  -     LIPASE; Future  -     Urinalysis; Future  -     Gamma GT; Future  -     AMYLASE  -     LIPASE  -     Urinalysis  -     Gamma GT    Sjogren's syndrome, with unspecified organ involvement  -     nitrofurantoin, macrocrystal-monohydrate, (MACROBID) 100 MG capsule; Take 1 capsule (100 mg total) by mouth 2 (two) times daily. for 10 days  -     fluconazole (DIFLUCAN) 150 MG Tab; Take 1 tablet (150 mg total) by mouth once daily. for 7 days  -     AMYLASE; Future  -     LIPASE; Future  -     Urinalysis; Future  -     Gamma GT; Future  -     AMYLASE  -     LIPASE  -     Urinalysis  -     Gamma GT    Immunodeficiency, common variable  -     nitrofurantoin, macrocrystal-monohydrate, (MACROBID) 100 MG capsule; Take 1 capsule (100 mg total) by mouth 2 (two) times daily. for 10 days  -     fluconazole (DIFLUCAN) 150 MG Tab; Take 1 tablet (150 mg total) by mouth once daily. for 7 days  -     AMYLASE; Future  -     LIPASE; Future  -     Urinalysis; Future  -     Gamma GT; Future  -     AMYLASE  -      LIPASE  -     Urinalysis  -     Gamma GT        The patient location is: home  The chief complaint leading to consultation is: psa, sjogren's dz    Visit type: audiovisual  Face to Face time with patient: 48   minutes of total time spent on the encounter, which includes face to face time and non-face to face time preparing to see the patient (eg, review of tests), Obtaining and/or reviewing separately obtained history, Documenting clinical information in the electronic or other health record, Independently interpreting results (not separately reported) and communicating results to the patient/family/caregiver, or Care coordination (not separately reported).         Each patient to whom he or she provides medical services by telemedicine is:  (1) informed of the relationship between the physician and patient and the respective role of any other health care provider with respect to management of the patient; and (2) notified that he or she may decline to receive medical services by telemedicine and may withdraw from such care at any time.    Notes:        More than 50% of the  40 minute encounter was spent face to face counseling the patient regarding current status and future plan of care as well as side of the medications. All questions were answered to patient's satisfaction

## 2023-02-06 DIAGNOSIS — D83.9 CVID (COMMON VARIABLE IMMUNODEFICIENCY): ICD-10-CM

## 2023-02-06 DIAGNOSIS — M35.00 SJOGREN'S SYNDROME, WITH UNSPECIFIED ORGAN INVOLVEMENT: ICD-10-CM

## 2023-02-06 DIAGNOSIS — M02.30 REACTIVE ARTHRITIS: ICD-10-CM

## 2023-02-06 DIAGNOSIS — K21.00 GASTROESOPHAGEAL REFLUX DISEASE WITH ESOPHAGITIS WITHOUT HEMORRHAGE: ICD-10-CM

## 2023-02-07 LAB
AMYLASE SERPL-CCNC: 35 U/L (ref 21–101)
APPEARANCE UR: CLEAR
BILIRUB UR QL STRIP: NEGATIVE
COLOR UR: YELLOW
GGT SERPL-CCNC: 10 U/L (ref 3–70)
GLUCOSE UR QL STRIP: NEGATIVE
HGB UR QL STRIP: NEGATIVE
KETONES UR QL STRIP: NEGATIVE
LEUKOCYTE ESTERASE UR QL STRIP: ABNORMAL
LIPASE SERPL-CCNC: 68 U/L (ref 7–60)
NITRITE UR QL STRIP: NEGATIVE
PH UR STRIP: 5.5 [PH] (ref 5–8)
PROT UR QL STRIP: NEGATIVE
SP GR UR STRIP: 1.01 (ref 1–1.03)

## 2023-02-09 RX ORDER — ERGOCALCIFEROL 1.25 MG/1
CAPSULE ORAL
Qty: 4 CAPSULE | Refills: 6 | Status: SHIPPED | OUTPATIENT
Start: 2023-02-09 | End: 2023-10-03 | Stop reason: SDUPTHER

## 2023-04-19 ENCOUNTER — TELEPHONE (OUTPATIENT)
Dept: RHEUMATOLOGY | Facility: CLINIC | Age: 58
End: 2023-04-19
Payer: COMMERCIAL

## 2023-04-19 DIAGNOSIS — E08.69 DIABETES MELLITUS DUE TO UNDERLYING CONDITION WITH OTHER SPECIFIED COMPLICATION, UNSPECIFIED WHETHER LONG TERM INSULIN USE: ICD-10-CM

## 2023-04-19 RX ORDER — SEMAGLUTIDE 1.34 MG/ML
0.5 INJECTION, SOLUTION SUBCUTANEOUS
Qty: 1 EACH | Refills: 5 | Status: SHIPPED | OUTPATIENT
Start: 2023-04-19 | End: 2023-06-01

## 2023-04-21 ENCOUNTER — TELEPHONE (OUTPATIENT)
Dept: RHEUMATOLOGY | Facility: CLINIC | Age: 58
End: 2023-04-21
Payer: COMMERCIAL

## 2023-04-21 NOTE — TELEPHONE ENCOUNTER
Returned pharmacy call regarding diagnosis code. Nurse informed code is E08.69.  patient profile has been up dated.

## 2023-04-21 NOTE — TELEPHONE ENCOUNTER
----- Message from Dotty Whittaker sent at 4/19/2023  9:14 AM CDT -----  Pharmacy Calling to Clarify an RX         Name of Caller Kulwinder          Pharmacy Name [facility]          Prescription Name semaglutide (OZEMPIC) 0.25 mg or 0.5 mg(2 mg/1.5 mL) pen injector         What do they need to clarify? Needs dx code for type 2 diabetes         Best Call Back Number [phone]          Additional Information: without dx code pharmacy will not be able to ill this rx

## 2023-05-11 ENCOUNTER — PATIENT OUTREACH (OUTPATIENT)
Dept: RHEUMATOLOGY | Facility: CLINIC | Age: 58
End: 2023-05-11
Payer: COMMERCIAL

## 2023-05-11 DIAGNOSIS — L40.50 PSA (PSORIATIC ARTHRITIS): Primary | ICD-10-CM

## 2023-05-11 DIAGNOSIS — R11.2 NAUSEA AND VOMITING, UNSPECIFIED VOMITING TYPE: ICD-10-CM

## 2023-05-11 DIAGNOSIS — R19.7 DIARRHEA, UNSPECIFIED TYPE: ICD-10-CM

## 2023-05-11 DIAGNOSIS — K52.9 GASTROENTERITIS: ICD-10-CM

## 2023-05-11 DIAGNOSIS — D83.9 CVID (COMMON VARIABLE IMMUNODEFICIENCY): ICD-10-CM

## 2023-05-11 DIAGNOSIS — K31.89 SPASM OF GI TRACT: ICD-10-CM

## 2023-05-11 PROCEDURE — 99358 PROLONG SERVICE W/O CONTACT: CPT | Mod: S$GLB,,, | Performed by: INTERNAL MEDICINE

## 2023-05-11 PROCEDURE — 99358 PR PROLONGED SERV,NO CONTACT,1ST HR: ICD-10-PCS | Mod: S$GLB,,, | Performed by: INTERNAL MEDICINE

## 2023-05-11 RX ORDER — DICYCLOMINE HYDROCHLORIDE 20 MG/1
20 TABLET ORAL EVERY 6 HOURS
Qty: 120 TABLET | Refills: 0 | Status: SHIPPED | OUTPATIENT
Start: 2023-05-11 | End: 2023-06-28

## 2023-05-11 NOTE — PROGRESS NOTES
Patient is a 57-year-old female who has had nausea and vomiting an acute episode of pancreatitis initially thought to be due to Ozempic however she also had a gallbladder that was hyper functioning after multiple episodes of nausea and vomiting she had her gallbladder bladder removed she was doing very well in prior to a trip in [place] she developed acute nausea and vomiting and diarrhea it resolved as soon as it came it was not associated with what she was eating and then she had another episode of nausea vomiting and diarrhea that ended with dehydration she did not go the hospital but chose to rehydrate herself the best she could she states present day she still has abdominal pain diffusely with the dumping like syndrome associated with any food not specifically fatty foods and the epigastric and left and right upper quadrant pain she is held her Ozempic she has held her Plaquenil she is reaching out to the GI doctor as well as do the surgeon but she still is not doing very well because of the diarrhea GI has ordered some stool studies and with the possibility of seeing parasite in the stool however labs were not ordered so we will add the labs we will continue to hold Ozempic as well as Plaquenil until the patient feels better

## 2023-05-12 LAB
ENA SS-A AB SER IA-ACNC: NORMAL AI
ENA SS-B AB SER IA-ACNC: ABNORMAL AI
LIPASE SERPL-CCNC: 44 U/L (ref 7–60)

## 2023-05-13 LAB
ALBUMIN SERPL-MCNC: 4 G/DL (ref 3.6–5.1)
ALBUMIN/GLOB SERPL: 1.8 (CALC) (ref 1–2.5)
ALP SERPL-CCNC: 64 U/L (ref 37–153)
ALT SERPL-CCNC: 37 U/L (ref 6–29)
AMYLASE SERPL-CCNC: 25 U/L (ref 21–101)
ANA PAT SER IF-IMP: ABNORMAL
ANA SER QL IF: POSITIVE
ANA TITR SER IF: ABNORMAL TITER
AST SERPL-CCNC: 17 U/L (ref 10–35)
BASOPHILS # BLD AUTO: 20 CELLS/UL (ref 0–200)
BASOPHILS NFR BLD AUTO: 0.3 %
BILIRUB SERPL-MCNC: 0.6 MG/DL (ref 0.2–1.2)
BUN SERPL-MCNC: 10 MG/DL (ref 7–25)
BUN/CREAT SERPL: ABNORMAL (CALC) (ref 6–22)
CALCIUM SERPL-MCNC: 8.6 MG/DL (ref 8.6–10.4)
CHLORIDE SERPL-SCNC: 104 MMOL/L (ref 98–110)
CO2 SERPL-SCNC: 27 MMOL/L (ref 20–32)
CREAT SERPL-MCNC: 0.75 MG/DL (ref 0.5–1.03)
CRP SERPL-MCNC: 6.9 MG/L
EGFR: 93 ML/MIN/1.73M2
EOSINOPHIL # BLD AUTO: 117 CELLS/UL (ref 15–500)
EOSINOPHIL NFR BLD AUTO: 1.8 %
ERYTHROCYTE [DISTWIDTH] IN BLOOD BY AUTOMATED COUNT: 12.4 % (ref 11–15)
ERYTHROCYTE [SEDIMENTATION RATE] IN BLOOD BY WESTERGREN METHOD: 2 MM/H
GGT SERPL-CCNC: 57 U/L (ref 3–70)
GLOBULIN SER CALC-MCNC: 2.2 G/DL (CALC) (ref 1.9–3.7)
GLUCOSE SERPL-MCNC: 98 MG/DL (ref 65–99)
HCT VFR BLD AUTO: 34.9 % (ref 35–45)
HGB BLD-MCNC: 11.9 G/DL (ref 11.7–15.5)
LYMPHOCYTES # BLD AUTO: 2373 CELLS/UL (ref 850–3900)
LYMPHOCYTES NFR BLD AUTO: 36.5 %
MCH RBC QN AUTO: 31.3 PG (ref 27–33)
MCHC RBC AUTO-ENTMCNC: 34.1 G/DL (ref 32–36)
MCV RBC AUTO: 91.8 FL (ref 80–100)
MONOCYTES # BLD AUTO: 462 CELLS/UL (ref 200–950)
MONOCYTES NFR BLD AUTO: 7.1 %
NEUTROPHILS # BLD AUTO: 3530 CELLS/UL (ref 1500–7800)
NEUTROPHILS NFR BLD AUTO: 54.3 %
PLATELET # BLD AUTO: 233 THOUSAND/UL (ref 140–400)
PMV BLD REES-ECKER: 10.3 FL (ref 7.5–12.5)
POTASSIUM SERPL-SCNC: 4.2 MMOL/L (ref 3.5–5.3)
PROT SERPL-MCNC: 6.2 G/DL (ref 6.1–8.1)
RBC # BLD AUTO: 3.8 MILLION/UL (ref 3.8–5.1)
RHEUMATOID FACT SERPL-ACNC: <14 IU/ML
SODIUM SERPL-SCNC: 138 MMOL/L (ref 135–146)
WBC # BLD AUTO: 6.5 THOUSAND/UL (ref 3.8–10.8)

## 2023-05-14 ENCOUNTER — PATIENT MESSAGE (OUTPATIENT)
Dept: RHEUMATOLOGY | Facility: CLINIC | Age: 58
End: 2023-05-14
Payer: COMMERCIAL

## 2023-05-14 DIAGNOSIS — L40.50 PSA (PSORIATIC ARTHRITIS): Primary | ICD-10-CM

## 2023-05-14 RX ORDER — METHYLPREDNISOLONE 4 MG/1
TABLET ORAL
Qty: 21 EACH | Refills: 0 | Status: SHIPPED | OUTPATIENT
Start: 2023-05-14 | End: 2023-06-04

## 2023-06-01 ENCOUNTER — OFFICE VISIT (OUTPATIENT)
Dept: RHEUMATOLOGY | Facility: CLINIC | Age: 58
End: 2023-06-01
Payer: COMMERCIAL

## 2023-06-01 VITALS
DIASTOLIC BLOOD PRESSURE: 73 MMHG | BODY MASS INDEX: 28.66 KG/M2 | WEIGHT: 172 LBS | SYSTOLIC BLOOD PRESSURE: 151 MMHG | HEIGHT: 65 IN | HEART RATE: 91 BPM

## 2023-06-01 DIAGNOSIS — R07.89 CHEST PAIN RADIATING TO ARM: ICD-10-CM

## 2023-06-01 DIAGNOSIS — M47.9 OSTEOARTHRITIS OF BACK: ICD-10-CM

## 2023-06-01 DIAGNOSIS — L60.0 INGROWN TOENAIL: ICD-10-CM

## 2023-06-01 DIAGNOSIS — M35.09 SJOGREN'S SYNDROME WITH OTHER ORGAN INVOLVEMENT: ICD-10-CM

## 2023-06-01 DIAGNOSIS — M72.2 PLANTAR FASCIA SYNDROME: ICD-10-CM

## 2023-06-01 DIAGNOSIS — D83.9 IMMUNODEFICIENCY, COMMON VARIABLE: ICD-10-CM

## 2023-06-01 DIAGNOSIS — E08.69 DIABETES MELLITUS DUE TO UNDERLYING CONDITION WITH OTHER SPECIFIED COMPLICATION, UNSPECIFIED WHETHER LONG TERM INSULIN USE: ICD-10-CM

## 2023-06-01 DIAGNOSIS — M54.2 NECK PAIN, BILATERAL: ICD-10-CM

## 2023-06-01 DIAGNOSIS — M02.30 REACTIVE ARTHRITIS, UNSPECIFIED SITE: ICD-10-CM

## 2023-06-01 DIAGNOSIS — L40.50 PSA (PSORIATIC ARTHRITIS): Primary | ICD-10-CM

## 2023-06-01 PROCEDURE — 3077F PR MOST RECENT SYSTOLIC BLOOD PRESSURE >= 140 MM HG: ICD-10-PCS | Mod: CPTII,S$GLB,, | Performed by: INTERNAL MEDICINE

## 2023-06-01 PROCEDURE — 99999 PR PBB SHADOW E&M-EST. PATIENT-LVL III: ICD-10-PCS | Mod: PBBFAC,,, | Performed by: INTERNAL MEDICINE

## 2023-06-01 PROCEDURE — 3008F PR BODY MASS INDEX (BMI) DOCUMENTED: ICD-10-PCS | Mod: CPTII,S$GLB,, | Performed by: INTERNAL MEDICINE

## 2023-06-01 PROCEDURE — 3008F BODY MASS INDEX DOCD: CPT | Mod: CPTII,S$GLB,, | Performed by: INTERNAL MEDICINE

## 2023-06-01 PROCEDURE — 3078F DIAST BP <80 MM HG: CPT | Mod: CPTII,S$GLB,, | Performed by: INTERNAL MEDICINE

## 2023-06-01 PROCEDURE — 1160F RVW MEDS BY RX/DR IN RCRD: CPT | Mod: CPTII,S$GLB,, | Performed by: INTERNAL MEDICINE

## 2023-06-01 PROCEDURE — 99215 PR OFFICE/OUTPT VISIT, EST, LEVL V, 40-54 MIN: ICD-10-PCS | Mod: S$GLB,,, | Performed by: INTERNAL MEDICINE

## 2023-06-01 PROCEDURE — 99999 PR PBB SHADOW E&M-EST. PATIENT-LVL III: CPT | Mod: PBBFAC,,, | Performed by: INTERNAL MEDICINE

## 2023-06-01 PROCEDURE — 1159F PR MEDICATION LIST DOCUMENTED IN MEDICAL RECORD: ICD-10-PCS | Mod: CPTII,S$GLB,, | Performed by: INTERNAL MEDICINE

## 2023-06-01 PROCEDURE — 99215 OFFICE O/P EST HI 40 MIN: CPT | Mod: S$GLB,,, | Performed by: INTERNAL MEDICINE

## 2023-06-01 PROCEDURE — 3078F PR MOST RECENT DIASTOLIC BLOOD PRESSURE < 80 MM HG: ICD-10-PCS | Mod: CPTII,S$GLB,, | Performed by: INTERNAL MEDICINE

## 2023-06-01 PROCEDURE — 1159F MED LIST DOCD IN RCRD: CPT | Mod: CPTII,S$GLB,, | Performed by: INTERNAL MEDICINE

## 2023-06-01 PROCEDURE — 1160F PR REVIEW ALL MEDS BY PRESCRIBER/CLIN PHARMACIST DOCUMENTED: ICD-10-PCS | Mod: CPTII,S$GLB,, | Performed by: INTERNAL MEDICINE

## 2023-06-01 PROCEDURE — 3077F SYST BP >= 140 MM HG: CPT | Mod: CPTII,S$GLB,, | Performed by: INTERNAL MEDICINE

## 2023-06-01 RX ORDER — GUSELKUMAB 100 MG/ML
100 INJECTION SUBCUTANEOUS
Qty: 1 ML | Refills: 6 | Status: ACTIVE | OUTPATIENT
Start: 2023-06-01 | End: 2024-02-14

## 2023-06-01 RX ORDER — TIRZEPATIDE 2.5 MG/.5ML
2.5 INJECTION, SOLUTION SUBCUTANEOUS
Qty: 4 PEN | Refills: 5 | Status: SHIPPED | OUTPATIENT
Start: 2023-06-01 | End: 2023-07-01

## 2023-06-01 RX ORDER — GUSELKUMAB 100 MG/ML
INJECTION SUBCUTANEOUS
Qty: 2 ML | Refills: 0 | Status: ACTIVE | OUTPATIENT
Start: 2023-06-01 | End: 2024-02-14

## 2023-06-01 ASSESSMENT — ROUTINE ASSESSMENT OF PATIENT INDEX DATA (RAPID3)
FATIGUE SCORE: 0
MDHAQ FUNCTION SCORE: 0.8
PSYCHOLOGICAL DISTRESS SCORE: 0
PATIENT GLOBAL ASSESSMENT SCORE: 4.5
TOTAL RAPID3 SCORE: 3.89
PAIN SCORE: 4.5

## 2023-06-01 NOTE — PROGRESS NOTES
Subjective:       Patient ID: Crystal Hein is a 57 y.o. female.    Chief Complaint: Disease Management      F/u: pt is a 57 she has  ra and sjogren's on plaquenil, she had a GI issues and her gallbladder removed  and contributes the ozempic blood  glucose is normal, pt had pancreatitis and uti. She was severe vertigo with she tried the hormone pellets she had a rash stopped pellets.Pain is located in multiple joints, both shoulder(s), both elbow(s), both wrist(s), both MCP(s): 1st, 2nd, 3rd, 4th and 5th, both PIP(s): 1st, 2nd, 3rd, 4th and 5th, both DIP(s): 1st and 2nd, both hip(s), both knee(s) and both MTP(s): 1st, 2nd, 3rd, 4th and 5th, is described as aching, pulsating, shooting and throbbing, and is constant, moderate . Severe neck pain she has a mass over her C spine, decrease rom upper movement and stays inflammed.    Review of Systems   Constitutional:  Positive for activity change and chills. Negative for appetite change, diaphoresis and unexpected weight change.   HENT:  Negative for congestion, dental problem, ear discharge, ear pain, facial swelling, mouth sores, nosebleeds, postnasal drip, rhinorrhea, sinus pressure, sneezing, sore throat, tinnitus and voice change.    Eyes:  Negative for photophobia, pain, discharge, redness and itching.   Respiratory:  Negative for apnea, cough, chest tightness, shortness of breath and wheezing.    Cardiovascular:  Positive for leg swelling. Negative for chest pain and palpitations.   Gastrointestinal:  Positive for abdominal pain. Negative for abdominal distention, constipation, diarrhea, nausea and vomiting.   Endocrine: Negative for cold intolerance, heat intolerance, polydipsia and polyuria.   Genitourinary:  Negative for decreased urine volume, difficulty urinating, flank pain, frequency, genital sores, hematuria and urgency.   Musculoskeletal:  Positive for arthralgias, back pain, joint swelling, neck pain and neck stiffness. Negative for gait problem.  "  Skin:  Negative for pallor, rash and wound.   Allergic/Immunologic: Negative for immunocompromised state.   Neurological:  Positive for weakness. Negative for dizziness, tremors and numbness.   Hematological:  Negative for adenopathy. Does not bruise/bleed easily.   Psychiatric/Behavioral:  Negative for sleep disturbance. The patient is not nervous/anxious.        Objective:     BP (!) 151/73   Pulse 91   Ht 5' 5" (1.651 m)   Wt 78 kg (172 lb)   BMI 28.62 kg/m²      Physical Exam   Constitutional: She is oriented to person, place, and time. No distress.   HENT:   Head: Normocephalic and atraumatic.   Eyes: Pupils are equal, round, and reactive to light. Right eye exhibits no discharge. Left eye exhibits no discharge.   Neck: No thyromegaly present.   Cardiovascular: Normal rate, regular rhythm and normal heart sounds. Exam reveals no gallop and no friction rub.   No murmur heard.  Pulmonary/Chest: Breath sounds normal. She has no wheezes. She has no rales. She exhibits no tenderness.   Abdominal: There is no abdominal tenderness. There is no rebound and no guarding.   Musculoskeletal:         General: Tenderness present.      Right shoulder: Tenderness present.      Left shoulder: Tenderness present.      Right elbow: Normal.      Left elbow: Tenderness present.      Right wrist: Tenderness present.      Left wrist: Tenderness present.      Cervical back: Neck supple.      Right knee: Effusion present. Tenderness present.      Left knee: Effusion present. Tenderness present.   Lymphadenopathy:     She has no cervical adenopathy.   Neurological: She is alert and oriented to person, place, and time. Gait normal.   Skin: Skin is dry. No rash noted. No erythema. There is pallor.   Psychiatric: Mood, affect and judgment normal.   Vitals reviewed.      Right Side Rheumatological Exam     Examination finds the elbow normal.    The patient is tender to palpation of the shoulder, wrist, knee, 1st PIP, 1st MCP, 2nd PIP, " 2nd MCP, 3rd PIP, 3rd MCP, 4th PIP, 4th MCP, 5th PIP and 5th MCP    She has swelling of the 1st PIP, 1st MCP, 2nd PIP, 2nd MCP, 3rd PIP, 3rd MCP, 4th PIP, 4th MCP, 5th PIP and 5th MCP    Shoulder Exam   Tenderness Location: no tenderness    Range of Motion   Active abduction:  abnormal   Adduction: abnormal  Sensation: normal    Knee Exam   Patellofemoral Crepitus: positive  Effusion: positive  Sensation: normal    Hip Exam   Tenderness Location: posterior  Sensation: normal    Elbow/Wrist Exam   Tenderness Location: no tenderness  Sensation: normal    Left Side Rheumatological Exam     The patient is tender to palpation of the shoulder, elbow, wrist, knee, 1st PIP, 1st MCP, 2nd PIP, 2nd MCP, 3rd PIP, 3rd MCP, 4th PIP, 4th MCP, 5th PIP and 5th MCP.    She has swelling of the 1st PIP, 1st MCP, 2nd PIP, 2nd MCP, 3rd PIP, 3rd MCP, 4th PIP, 4th MCP, 5th PIP and 5th MCP    Shoulder Exam   Tenderness Location: no tenderness    Range of Motion   Active abduction:  abnormal   Sensation: normal    Knee Exam     Patellofemoral Crepitus: positive  Effusion: positive  Sensation: normal    Hip Exam   Tenderness Location: posterior  Sensation: normal    Elbow/Wrist Exam   Sensation: normal      Back/Neck Exam   General Inspection   Gait: normal             Results for orders placed or performed in visit on 05/11/23   C-Reactive Protein   Result Value Ref Range    CRP 6.9 <8.0 mg/L   Comprehensive Metabolic Panel   Result Value Ref Range    Glucose 98 65 - 99 mg/dL    BUN 10 7 - 25 mg/dL    Creatinine 0.75 0.50 - 1.03 mg/dL    eGFR 93 > OR = 60 mL/min/1.73m2    BUN/Creatinine Ratio NOT APPLICABLE 6 - 22 (calc)    Sodium 138 135 - 146 mmol/L    Potassium 4.2 3.5 - 5.3 mmol/L    Chloride 104 98 - 110 mmol/L    CO2 27 20 - 32 mmol/L    Calcium 8.6 8.6 - 10.4 mg/dL    Total Protein 6.2 6.1 - 8.1 g/dL    Albumin 4.0 3.6 - 5.1 g/dL    Globulin, Total 2.2 1.9 - 3.7 g/dL (calc)    Albumin/Globulin Ratio 1.8 1.0 - 2.5 (calc)    Total  Bilirubin 0.6 0.2 - 1.2 mg/dL    Alkaline Phosphatase 64 37 - 153 U/L    AST 17 10 - 35 U/L    ALT 37 (H) 6 - 29 U/L   CBC Auto Differential   Result Value Ref Range    WBC 6.5 3.8 - 10.8 Thousand/uL    RBC 3.80 3.80 - 5.10 Million/uL    Hemoglobin 11.9 11.7 - 15.5 g/dL    Hematocrit 34.9 (L) 35.0 - 45.0 %    MCV 91.8 80.0 - 100.0 fL    MCH 31.3 27.0 - 33.0 pg    MCHC 34.1 32.0 - 36.0 g/dL    RDW 12.4 11.0 - 15.0 %    Platelets 233 140 - 400 Thousand/uL    MPV 10.3 7.5 - 12.5 fL    Neutrophils, Abs 3,530 1,500 - 7,800 cells/uL    Lymph # 2,373 850 - 3,900 cells/uL    Mono # 462 200 - 950 cells/uL    Eos # 117 15 - 500 cells/uL    Baso # 20 0 - 200 cells/uL    Neutrophils Relative 54.3 %    Lymph % 36.5 %    Mono % 7.1 %    Eosinophil % 1.8 %    Basophil % 0.3 %   Gamma GT   Result Value Ref Range    GGT 57 3 - 70 U/L   SALEEM Screen w/Reflex   Result Value Ref Range    SALEEM POSITIVE (A) NEGATIVE   Sjogrens syndrome-A extractable nuclear antibody   Result Value Ref Range    Anti-SSA Antibody <1.0 NEG <1.0 NEG AI   Sjogrens syndrome-B extractable nuclear antibody   Result Value Ref Range    Anti-SSB Antibody 1.0 POS (A) <1.0 NEG AI   Rheumatoid Factor   Result Value Ref Range    Rheumatoid Factor <14 <14 IU/mL   LIPASE   Result Value Ref Range    Lipase 44 7 - 60 U/L   AMYLASE   Result Value Ref Range    Amylase 25 21 - 101 U/L   Sedimentation Rate   Result Value Ref Range    Sed Rate 2 < OR = 30 mm/h   Antinuclear antibodies, titer and pattern   Result Value Ref Range    SALEEM Titer 1:40 (H) titer    SALEEM Pattern Nuclear, Dense Fine Speckled (A)      reviewed labs with patient during this visit       Assessment:          Encounter Diagnoses   Name Primary?    PSA (psoriatic arthritis) Yes    Diabetes mellitus due to underlying condition with other specified complication, unspecified whether long term insulin use     Sjogren's syndrome with other organ involvement     Reactive arthritis, unspecified site     Osteoarthritis  of back     Immunodeficiency, common variable     Neck pain, bilateral     Chest pain radiating to arm     Plantar fascia syndrome     Ingrown toenail                Plan:     [place] was seen today for disease management.    Diagnoses and all orders for this visit:    PSA (psoriatic arthritis)  -     guselkumab (TREMFYA) 100 mg/mL AtIn; Inject 1 ml into the skin wk 0 and wk 4  -     guselkumab (TREMFYA) 100 mg/mL AtIn; Inject 100 mg into the skin every 8 weeks.  -     Sedimentation rate; Future  -     C-Reactive Protein; Future  -     Comprehensive Metabolic Panel; Future  -     CBC Auto Differential; Future    Diabetes mellitus due to underlying condition with other specified complication, unspecified whether long term insulin use  -     tirzepatide (MOUNJARO) 2.5 mg/0.5 mL PnIj; Inject 2.5 mg into the skin every 7 days.    Sjogren's syndrome with other organ involvement  -     Sedimentation rate; Future  -     C-Reactive Protein; Future  -     Comprehensive Metabolic Panel; Future  -     CBC Auto Differential; Future    Reactive arthritis, unspecified site  -     Sedimentation rate; Future  -     C-Reactive Protein; Future  -     Comprehensive Metabolic Panel; Future  -     CBC Auto Differential; Future    Osteoarthritis of back  -     Sedimentation rate; Future  -     C-Reactive Protein; Future  -     Comprehensive Metabolic Panel; Future  -     CBC Auto Differential; Future    Immunodeficiency, common variable  -     Sedimentation rate; Future  -     C-Reactive Protein; Future  -     Comprehensive Metabolic Panel; Future  -     CBC Auto Differential; Future    Neck pain, bilateral  -     Sedimentation rate; Future  -     C-Reactive Protein; Future  -     Comprehensive Metabolic Panel; Future  -     CBC Auto Differential; Future    Chest pain radiating to arm  -     Sedimentation rate; Future  -     C-Reactive Protein; Future  -     Comprehensive Metabolic Panel; Future  -     CBC Auto Differential;  Future    Plantar fascia syndrome  -     X-Ray Foot Complete 3 view Bilateral; Future    Ingrown toenail  -     Ambulatory referral/consult to Podiatry; Future      Seeing pt to she is cleared from gallbladder surgery. We will get her tremfya sent to her home and your Clinical Pharmacist Dr Espino will do the teaching and injection  More than 50% of the  40 minute encounter was spent face to face counseling the patient regarding current status and future plan of care as well as side effects  of the medications. All questions were answered to patient's satisfaction also includes  non-face to face time preparing to see the patient (eg, review of tests), Obtaining and/or reviewing separately obtained history, Documenting clinical information in the electronic or other health record, Independently interpreting results

## 2023-06-01 NOTE — PATIENT INSTRUCTIONS
Once you receive the date your injectable medication will arrive from the pharmacy, please contact our office to schedule an appointment with our clinical pharmacist, Dr. Barbara Hsu. She will provide you with information about your injectable medication, show you how to inject it, monitor you for any reaction, and answer any questions you may have.     Please remember to bring your medication with you once your appointment is scheduled with Dr. Hsu.

## 2023-06-02 ENCOUNTER — TELEPHONE (OUTPATIENT)
Dept: PHARMACY | Facility: CLINIC | Age: 58
End: 2023-06-02
Payer: COMMERCIAL

## 2023-06-02 ENCOUNTER — SPECIALTY PHARMACY (OUTPATIENT)
Dept: PHARMACY | Facility: CLINIC | Age: 58
End: 2023-06-02
Payer: COMMERCIAL

## 2023-06-02 DIAGNOSIS — L40.50 PSA (PSORIATIC ARTHRITIS): Primary | ICD-10-CM

## 2023-06-02 NOTE — TELEPHONE ENCOUNTER
Hello, this is Kane Garcias, clinical pharmacist with [facility] that is part of your care team.  We have begun working on your prescription that your doctor has sent us. Our next steps include:     Working with your insurance company to obtain approval for your medication  Working with you to ensure your medication is affordable     We will be calling you along the way with updates on your medication but if you have any concerns or receive information that you would like to discuss please reach us at [phone].    Welcome call outcome: Patient/caregiver reached    Patient gives permission to sign her up for any copay card assistance to reduce her copay.    She will be declining the initial consult/education on the therapy as she stated the care team at the MD office want her to come in to show her how to use it, and the pharmacy team there will review the medication with her in person.

## 2023-06-02 NOTE — TELEPHONE ENCOUNTER
Specialty Pharmacy - Initial Clinical Assessment    Specialty Medication Orders Linked to Encounter      Flowsheet Row Most Recent Value   Medication #1 guselkumab (TREMFYA) 100 mg/mL AtIn (Order#[id number], Rx#[id number])   Medication #2 guselkumab (TREMFYA) 100 mg/mL AtIn (Order#[id number], Rx#[id number])          Patient Diagnosis   L40.50 - PSA (psoriatic arthritis)    Lou Hein is a 57 y.o. female, who is followed by the specialty pharmacy service for management and education.    Recent Encounters       Date Type Provider Description    06/02/2023 Specialty Pharmacy Kane Garcias, Pramod Initial Clinical Assessment    06/02/2023 Specialty Pharmacy Kane Garcias PharmD Referral Authorization    11/02/2022 Specialty Pharmacy Pradeep Terrazas PharmD Clinical Intervention    10/19/2022 Specialty Pharmacy Pradeep Terrazas PharmD Referral Authorization    10/17/2022 Specialty Pharmacy Luz Elena Larios PharmD             Current Outpatient Medications   Medication Sig    azelastine (ASTELIN) 137 mcg (0.1 %) nasal spray use 1-2 sprays IN EACH NOSTRIL TWICE DAILY FOR allergies    bisacodyL (DULCOLAX) 5 mg EC tablet TAKE TWO TABLETS BY MOUTH as directed for ONE day    clindamycin (CLEOCIN T) 1 % lotion Apply topically 2 (two) times daily.    dextroamphetamine-amphetamine 10 mg Tab Take 1 tablet by mouth once daily.    dicyclomine (BENTYL) 20 mg tablet Take 1 tablet (20 mg total) by mouth every 6 (six) hours.    ergocalciferol (ERGOCALCIFEROL) 50,000 unit Cap TAKE ONE CAPSULE BY MOUTH EVERY 7 DAYS    estradioL (ESTRACE) 2 MG tablet Take 2 tablets (4 mg total) by mouth once daily.    fluticasone propionate (CUTIVATE) 0.05 % cream Apply 1 application topically 2 (two) times daily. (Patient not taking: No sig reported)    furosemide (LASIX) 40 MG tablet Take 1 tablet (40 mg total) by mouth once daily.    guselkumab (TREMFYA) 100 mg/mL AtIn Inject 1 ml (100mg) into the skin on  week 0 and week 4    guselkumab (TREMFYA) 100 mg/mL AtIn Inject 100 mg into the skin every 8 weeks.    ketorolac (TORADOL) 10 mg tablet TAKE ONE TABLET BY MOUTH EVERY 6 HOURS FOR 5 DAYS    lansoprazole (PREVACID) 30 MG capsule TAKE ONE CAPSULE BY MOUTH ONCE DAILY    levocetirizine (XYZAL) 5 MG tablet Take 1 tablet (5 mg total) by mouth every evening. (Patient taking differently: Take 5 mg by mouth 2 (two) times daily.)    meclizine (ANTIVERT) 25 mg tablet TAKE ONE TABLET BY MOUTH THREE TIMES DAILY AS NEEDED    methylPREDNISolone (MEDROL DOSEPACK) 4 mg tablet use as directed    montelukast (SINGULAIR) 10 mg tablet TAKE ONE TABLET BY MOUTH EVERY EVENING    MYRBETRIQ 50 mg Tb24 TAKE ONE TABLET BY MOUTH EVERY DAY    nystatin (MYCOSTATIN) 100,000 unit/mL suspension Take 5 mLs by mouth 4 (four) times daily.    ofloxacin (FLOXIN) 0.3 % otic solution ofloxacin 0.3 % ear drops   INSTILL 5 DROPS INTO AFFECTED EAR(S) BY OTIC ROUTE 2 TIMES PER DAY x 7 days    ondansetron (ZOFRAN) 4 MG tablet TAKE ONE TABLET BY MOUTH EVERY 4 TO 6 HOURS AS NEEDED    ondansetron (ZOFRAN-ODT) 4 MG TbDL Take 1 tablet (4 mg total) by mouth every 6 (six) hours as needed.    pantoprazole (PROTONIX) 40 MG tablet TAKE ONE TABLET BY MOUTH once daily    potassium chloride SA (K-DUR,KLOR-CON) 20 MEQ tablet Take 1 tablet (20 mEq total) by mouth once daily. (Patient not taking: No sig reported)    promethazine (PHENERGAN) 25 MG tablet Take 1 tablet (25 mg total) by mouth every 4 (four) hours.    sucralfate (CARAFATE) 1 gram tablet Take 1 tablet (1 g total) by mouth 4 (four) times daily. (Patient not taking: No sig reported)    SUPREP BOWEL PREP KIT 17.5-3.13-1.6 gram SolR TAKE 117 ml BY MOUTH TWICE DAILY as directed for ONE day    tirzepatide (MOUNJARO) 2.5 mg/0.5 mL PnIj Inject 2.5 mg into the skin every 7 days.    valACYclovir (VALTREX) 1000 MG tablet Take 1 tablet (1,000 mg total) by mouth 2 (two) times daily.   Last reviewed on 6/1/2023  9:37 AM by Ghassan  LYSSA Truong MD    Review of patient's allergies indicates:   Allergen Reactions    Dexamethasone Other (See Comments)     Paranoia and anxiety    Imitrex [sumatriptan succinate] Anaphylaxis    Azulfidine [sulfasalazine]     Flagyl [metronidazole hcl] Dermatitis    Pyridium [phenazopyridine]    Last reviewed on  6/1/2023 9:37 AM by Ghassan Truong    Drug Interactions    Drug interactions evaluated: yes  Clinically relevant drug interactions identified: no  Provided the patient with educational material regarding drug interactions: not applicable           Assessment Questions - Documented Responses      Flowsheet Row Most Recent Value   Assessment    Medication Reconciliation completed for patient Yes   During the past 4 weeks, has patient missed any activities due to condition or medication? No   During the past 4 weeks, did patient have any of the following urgent care visits? None   Goals of Therapy Status Discussed (new start)   Status of the patients ability to self-administer: Is Able   All education points have been covered with patient? No, patient declined- printed education provided  [patient stated MD office and their pharmacy team will educate the patient in office. She declines [facility] education services for therapy.]   Welcome packet contents reviewed and discussed with patient? Yes   Assesment completed? Yes   Plan Therapy being initiated   Do you need to open a clinical intervention (i-vent)? No   Do you want to schedule first shipment? Yes   Medication #1 Assessment Info    Patient status New medication, New to [facility]   Is this medication appropriate for the patient? Yes   Is this medication effective? Not yet started   Medication #2 Assessment Info    Patient status New medication, New to [facility]   Is this medication appropriate for the patient? Yes   Is this medication effective? Not yet started          Refill Questions - Documented Responses      Flowsheet Row Most Recent Value   Patient Availability and HIPAA  "Verification    Does patient want to proceed with activity? Yes   HIPAA/medical authority confirmed? Yes   Relationship to patient of person spoken to? Self   Refill Screening Questions    When does the patient need to receive the medication? 06/06/23   Refill Delivery Questions    How will the patient receive the medication? [facility]   When does the patient need to receive the medication? 06/06/23   Shipping Address Home   Address in [facility] confirmed and updated if neccessary? Yes   Expected Copay ($) 5   Is the patient able to afford the medication copay? Yes   Payment Method new CC added to file   Days supply of Refill 28   Supplies needed? No supplies needed   Refill activity completed? Yes   Refill activity plan Refill scheduled   Shipment/Pickup Date: 06/05/23            Objective    She has a past medical history of Anemia, Celiac disease/sprue, Frequent headaches, Immunodeficiency, Osteoarthritis, Pneumonia, and Sjogren's disease.    Tried/failed medications: n/a    BP Readings from Last 4 Encounters:   06/01/23 (!) 151/73   10/03/22 119/76   01/25/22 138/75   05/25/21 128/79     Ht Readings from Last 4 Encounters:   06/01/23 5' 5" (1.651 m)   10/03/22 5' 5" (1.651 m)   01/25/22 5' 5" (1.651 m)   05/25/21 5' 5" (1.651 m)     Wt Readings from Last 4 Encounters:   06/01/23 78 kg (172 lb)   10/03/22 83.3 kg (183 lb 10.3 oz)   01/25/22 83.3 kg (183 lb 12.1 oz)   05/25/21 78.9 kg (174 lb)       The goals of prescribed drug therapy management include:  Supporting patient to meet the prescriber's medical treatment objectives  Improving or maintaining quality of life  Maintaining optimal therapy adherence  Minimizing and managing side effects      Goals of Therapy Status: Discussed (new start)    Assessment/Plan  Patient plans to start therapy on 06/06/23      Indication, dosage, appropriateness, effectiveness, safety and convenience of her specialty medication(s) were reviewed today.     Patient Education "   Pharmacist offer to  patient was declined. Printed educational materials will be provided with medication.      Patient declined the initial consult as she was advised to bring in the Tremfya into the clinic and the MD will show her how to use the device and go over all educational material with the patient.     She is aware of [facility] delivery and refill services.     Tasks added this encounter   3/4/2024 - Clinical Assessment (1 year recurrence)   Tasks due within next 3 months   No tasks due.     Kane Garcias, PharmD  [facility]  [address] [facility] [address]  Phone: [phone]  Fax: [phone]

## 2023-06-06 ENCOUNTER — HOSPITAL ENCOUNTER (OUTPATIENT)
Dept: RADIOLOGY | Facility: HOSPITAL | Age: 58
Discharge: HOME | End: 2023-06-06
Attending: INTERNAL MEDICINE
Payer: COMMERCIAL

## 2023-06-06 ENCOUNTER — OFFICE VISIT (OUTPATIENT)
Dept: PODIATRY | Facility: CLINIC | Age: 58
End: 2023-06-06
Payer: COMMERCIAL

## 2023-06-06 VITALS — HEIGHT: 65 IN | BODY MASS INDEX: 28.65 KG/M2 | WEIGHT: 171.94 LBS

## 2023-06-06 DIAGNOSIS — L60.0 INGROWN TOENAIL: ICD-10-CM

## 2023-06-06 DIAGNOSIS — M72.2 PLANTAR FASCIA SYNDROME: ICD-10-CM

## 2023-06-06 DIAGNOSIS — L60.8 PINCER NAIL DEFORMITY: ICD-10-CM

## 2023-06-06 DIAGNOSIS — M72.2 PLANTAR FASCIITIS: Primary | ICD-10-CM

## 2023-06-06 PROCEDURE — 1160F PR REVIEW ALL MEDS BY PRESCRIBER/CLIN PHARMACIST DOCUMENTED: ICD-10-PCS | Mod: CPTII,S$GLB,, | Performed by: STUDENT IN AN ORGANIZED HEALTH CARE EDUCATION/TRAINING PROGRAM

## 2023-06-06 PROCEDURE — 1159F PR MEDICATION LIST DOCUMENTED IN MEDICAL RECORD: ICD-10-PCS | Mod: CPTII,S$GLB,, | Performed by: STUDENT IN AN ORGANIZED HEALTH CARE EDUCATION/TRAINING PROGRAM

## 2023-06-06 PROCEDURE — 99203 OFFICE O/P NEW LOW 30 MIN: CPT | Mod: S$GLB,,, | Performed by: STUDENT IN AN ORGANIZED HEALTH CARE EDUCATION/TRAINING PROGRAM

## 2023-06-06 PROCEDURE — 73630 XR FOOT COMPLETE 3 VIEW BILATERAL: ICD-10-PCS | Mod: 26,,, | Performed by: RADIOLOGY

## 2023-06-06 PROCEDURE — 73630 X-RAY EXAM OF FOOT: CPT | Mod: TC,50,FY,PO

## 2023-06-06 PROCEDURE — 1159F MED LIST DOCD IN RCRD: CPT | Mod: CPTII,S$GLB,, | Performed by: STUDENT IN AN ORGANIZED HEALTH CARE EDUCATION/TRAINING PROGRAM

## 2023-06-06 PROCEDURE — 99999 PR PBB SHADOW E&M-EST. PATIENT-LVL III: CPT | Mod: PBBFAC,,, | Performed by: STUDENT IN AN ORGANIZED HEALTH CARE EDUCATION/TRAINING PROGRAM

## 2023-06-06 PROCEDURE — 1160F RVW MEDS BY RX/DR IN RCRD: CPT | Mod: CPTII,S$GLB,, | Performed by: STUDENT IN AN ORGANIZED HEALTH CARE EDUCATION/TRAINING PROGRAM

## 2023-06-06 PROCEDURE — 99203 PR OFFICE/OUTPT VISIT, NEW, LEVL III, 30-44 MIN: ICD-10-PCS | Mod: S$GLB,,, | Performed by: STUDENT IN AN ORGANIZED HEALTH CARE EDUCATION/TRAINING PROGRAM

## 2023-06-06 PROCEDURE — 3008F BODY MASS INDEX DOCD: CPT | Mod: CPTII,S$GLB,, | Performed by: STUDENT IN AN ORGANIZED HEALTH CARE EDUCATION/TRAINING PROGRAM

## 2023-06-06 PROCEDURE — 99999 PR PBB SHADOW E&M-EST. PATIENT-LVL III: ICD-10-PCS | Mod: PBBFAC,,, | Performed by: STUDENT IN AN ORGANIZED HEALTH CARE EDUCATION/TRAINING PROGRAM

## 2023-06-06 PROCEDURE — 73630 X-RAY EXAM OF FOOT: CPT | Mod: 26,,, | Performed by: RADIOLOGY

## 2023-06-06 PROCEDURE — 3008F PR BODY MASS INDEX (BMI) DOCUMENTED: ICD-10-PCS | Mod: CPTII,S$GLB,, | Performed by: STUDENT IN AN ORGANIZED HEALTH CARE EDUCATION/TRAINING PROGRAM

## 2023-06-06 NOTE — PROGRESS NOTES
No chief complaint on file.        MEDICAL DECISION MAKING         1. Plantar fasciitis - Left Foot  -     Ambulatory referral/consult to Physical/Occupational Therapy; Future; Expected date: 06/13/2023    2. Ingrown toenail - Left Foot  -     Ambulatory referral/consult to Podiatry  -     Ambulatory referral/consult to Physical/Occupational Therapy; Future; Expected date: 06/13/2023    3. Pincer nail deformity - Left Foot           I counseled the patient on the patient's conditions, their implications and medical management.     Plantar Fasciitis:  -Patient was given a printout of stretching exercises to stretch the achilles tendon and increase ankle dorsiflexion  -Patient instructed to ice with a frozen water bottle as instructed in the handout.  -Patient instructed to refrain from barefoot walking. I recommend Hoka slippers.  -Shoe gear was discussed with patient and recommended a supportive shoe with a stiff shank that doesn't flex at the arch. The shoe should also have an elevated heel. Some brands include Hoover, Asics and Hokas. The length of the shoe should accommodate the width of a thumb between the big toe and the edge of the shoe.  - PT PF L foot.    Discussed different treatment options for the left pincer nail. Total removal with total matrixectomy or total removal with partial phenol matrixectomy.    Patient to follow up in about 8 weeks.       HPI:       Crystal Hein is a 57 y.o. female who presents to clinic with concerns of L heel pain for a couple of months.     Pain is worse with weight bearing and first few steps after prolonged periods of rest.     Patient denies acute trauma to the affected area.   Treatment tried: different shoes    Also has left pincer nail that hurts and gets inflamed from time to time.      Patient Active Problem List   Diagnosis    Sjogren's disease    Reactive arthritis    Osteoarthritis of back    Chest pain radiating to arm    Immunodeficiency, common variable     Neck pain, bilateral         Current Outpatient Medications on File Prior to Visit   Medication Sig Dispense Refill    azelastine (ASTELIN) 137 mcg (0.1 %) nasal spray use 1-2 sprays IN EACH NOSTRIL TWICE DAILY FOR allergies      bisacodyL (DULCOLAX) 5 mg EC tablet TAKE TWO TABLETS BY MOUTH as directed for ONE day      clindamycin (CLEOCIN T) 1 % lotion Apply topically 2 (two) times daily.      dextroamphetamine-amphetamine 10 mg Tab Take 1 tablet by mouth once daily.      dicyclomine (BENTYL) 20 mg tablet Take 1 tablet (20 mg total) by mouth every 6 (six) hours. 120 tablet 0    ergocalciferol (ERGOCALCIFEROL) 50,000 unit Cap TAKE ONE CAPSULE BY MOUTH EVERY 7 DAYS 4 capsule 6    estradioL (ESTRACE) 2 MG tablet Take 2 tablets (4 mg total) by mouth once daily. 60 tablet 5    fluticasone propionate (CUTIVATE) 0.05 % cream Apply 1 application topically 2 (two) times daily. 60 g 1    furosemide (LASIX) 40 MG tablet Take 1 tablet (40 mg total) by mouth once daily. 30 tablet 3    guselkumab (TREMFYA) 100 mg/mL AtIn Inject 1 ml (100mg) into the skin on week 0 and week 4 2 mL 0    guselkumab (TREMFYA) 100 mg/mL AtIn Inject 100 mg into the skin every 8 weeks. 1 mL 6    ketorolac (TORADOL) 10 mg tablet TAKE ONE TABLET BY MOUTH EVERY 6 HOURS FOR 5 DAYS 20 tablet 3    lansoprazole (PREVACID) 30 MG capsule TAKE ONE CAPSULE BY MOUTH ONCE DAILY 90 capsule 3    levocetirizine (XYZAL) 5 MG tablet Take 1 tablet (5 mg total) by mouth every evening. (Patient taking differently: Take 5 mg by mouth 2 (two) times daily.) 30 tablet 5    meclizine (ANTIVERT) 25 mg tablet TAKE ONE TABLET BY MOUTH THREE TIMES DAILY AS NEEDED 90 tablet 3    montelukast (SINGULAIR) 10 mg tablet TAKE ONE TABLET BY MOUTH EVERY EVENING 30 tablet 6    MYRBETRIQ 50 mg Tb24 TAKE ONE TABLET BY MOUTH EVERY DAY 30 tablet 11    nystatin (MYCOSTATIN) 100,000 unit/mL suspension Take 5 mLs by mouth 4 (four) times daily.      ofloxacin (FLOXIN) 0.3 % otic solution  "ofloxacin 0.3 % ear drops   INSTILL 5 DROPS INTO AFFECTED EAR(S) BY OTIC ROUTE 2 TIMES PER DAY x 7 days      ondansetron (ZOFRAN) 4 MG tablet TAKE ONE TABLET BY MOUTH EVERY 4 TO 6 HOURS AS NEEDED      ondansetron (ZOFRAN-ODT) 4 MG TbDL Take 1 tablet (4 mg total) by mouth every 6 (six) hours as needed. 20 tablet 3    pantoprazole (PROTONIX) 40 MG tablet TAKE ONE TABLET BY MOUTH once daily 90 tablet 3    potassium chloride SA (K-DUR,KLOR-CON) 20 MEQ tablet Take 1 tablet (20 mEq total) by mouth once daily. 30 tablet 5    promethazine (PHENERGAN) 25 MG tablet Take 1 tablet (25 mg total) by mouth every 4 (four) hours. 45 tablet 2    sucralfate (CARAFATE) 1 gram tablet Take 1 tablet (1 g total) by mouth 4 (four) times daily. 60 tablet 5    SUPREP BOWEL PREP KIT 17.5-3.13-1.6 gram SolR TAKE 117 ml BY MOUTH TWICE DAILY as directed for ONE day      tirzepatide (MOUNJARO) 2.5 mg/0.5 mL PnIj Inject 2.5 mg into the skin every 7 days. 4 pen 5    valACYclovir (VALTREX) 1000 MG tablet Take 1 tablet (1,000 mg total) by mouth 2 (two) times daily. 60 tablet 11     No current facility-administered medications on file prior to visit.           Review of patient's allergies indicates:   Allergen Reactions    Dexamethasone Other (See Comments)     Paranoia and anxiety    Imitrex [sumatriptan succinate] Anaphylaxis    Azulfidine [sulfasalazine]     Flagyl [metronidazole hcl] Dermatitis    Pyridium [phenazopyridine]          ROS:  General ROS: negative for  chills, fatigue or fever  Cardiovascular ROS: no chest pain or dyspnea on exertion  Musculoskeletal ROS: negative for joint pain or joint stiffness.  Negative for loss of strength.  Positive for foot pain.   Neuro ROS: Negative for syncope, numbness, or muscle weakness  Skin ROS: Negative for rash, itching or nail/hair changes.           OBJECTIVE:         Vitals:    06/06/23 1044   Weight: 78 kg (171 lb 15.3 oz)   Height: 5' 5" (1.651 m)        Left Lower extremity exam:  Vasc: "   Palpable pedal pulses.   Feet appropriately warm to touch.   Cap refill time is within normal limits   Edema: none    Neurological:    Light touch, proprioception, and Sharp/dull sensation are all intact.   There is no Tinel's along the tarsal tunnel.      Derm:   No open lesions, macerations, or rashes  Bruising:  absent  Redness:  absent  Pedal hair:  present  Pincer left great toe nail with onycholysis and subungual debris    MSK:    Palpable pain plantar medial tubercle of the calcaneus left,    tightness to the Achilles tendon with ROM left   There is no pain with the heel squeeze/compression  test.

## 2023-06-27 DIAGNOSIS — K31.89 SPASM OF GI TRACT: ICD-10-CM

## 2023-06-27 DIAGNOSIS — R19.7 DIARRHEA, UNSPECIFIED TYPE: ICD-10-CM

## 2023-06-27 DIAGNOSIS — R11.2 NAUSEA AND VOMITING, UNSPECIFIED VOMITING TYPE: ICD-10-CM

## 2023-06-28 RX ORDER — DICYCLOMINE HYDROCHLORIDE 20 MG/1
TABLET ORAL
Qty: 120 TABLET | Refills: 3 | Status: SHIPPED | OUTPATIENT
Start: 2023-06-28

## 2023-06-29 ENCOUNTER — SPECIALTY PHARMACY (OUTPATIENT)
Dept: PHARMACY | Facility: CLINIC | Age: 58
End: 2023-06-29

## 2023-06-29 NOTE — TELEPHONE ENCOUNTER
Outgoing call regarding tremfya refill.  Pt said she has not started the medication yet (still has first pen [facility] shipped to her) as she wants to have 1st dose done at [facility]. She said she is going to have injection done at [facility] tomorrow.  Pended refill call appropriately.  Pt had no questions.

## 2023-07-01 ENCOUNTER — TELEPHONE (OUTPATIENT)
Dept: RHEUMATOLOGY | Facility: CLINIC | Age: 58
End: 2023-07-01
Payer: COMMERCIAL

## 2023-07-01 DIAGNOSIS — E08.69 DIABETES MELLITUS DUE TO UNDERLYING CONDITION WITH OTHER SPECIFIED COMPLICATION, UNSPECIFIED WHETHER LONG TERM INSULIN USE: Primary | ICD-10-CM

## 2023-07-01 RX ORDER — TIRZEPATIDE 5 MG/.5ML
5 INJECTION, SOLUTION SUBCUTANEOUS
Qty: 4 PEN | Refills: 5 | Status: SHIPPED | OUTPATIENT
Start: 2023-07-01 | End: 2023-08-03

## 2023-07-17 ENCOUNTER — CLINICAL SUPPORT (OUTPATIENT)
Dept: RHEUMATOLOGY | Facility: CLINIC | Age: 58
End: 2023-07-17
Payer: COMMERCIAL

## 2023-07-17 VITALS
WEIGHT: 172 LBS | HEIGHT: 65 IN | SYSTOLIC BLOOD PRESSURE: 135 MMHG | DIASTOLIC BLOOD PRESSURE: 73 MMHG | HEART RATE: 86 BPM | BODY MASS INDEX: 28.66 KG/M2

## 2023-07-17 DIAGNOSIS — L40.50 PSA (PSORIATIC ARTHRITIS): Primary | ICD-10-CM

## 2023-07-17 DIAGNOSIS — Z71.85 IMMUNIZATION COUNSELING: ICD-10-CM

## 2023-07-17 DIAGNOSIS — Z51.81 MEDICATION MONITORING ENCOUNTER: ICD-10-CM

## 2023-07-17 DIAGNOSIS — Z71.89 INJECTION EDUCATION, ENCOUNTER FOR: ICD-10-CM

## 2023-07-17 PROCEDURE — 99999 PR PBB SHADOW E&M-EST. PATIENT-LVL IV: ICD-10-PCS | Mod: PBBFAC,,,

## 2023-07-17 PROCEDURE — 99999 PR PBB SHADOW E&M-EST. PATIENT-LVL IV: CPT | Mod: PBBFAC,,,

## 2023-07-17 RX ORDER — POTASSIUM CHLORIDE 750 MG/1
10 TABLET, EXTENDED RELEASE ORAL DAILY PRN
COMMUNITY
Start: 2023-03-21 | End: 2024-03-01

## 2023-07-17 RX ORDER — FUROSEMIDE 20 MG/1
10-20 TABLET ORAL DAILY PRN
COMMUNITY
Start: 2023-03-21

## 2023-07-17 RX ORDER — FLUTICASONE PROPIONATE 50 MCG
2 SPRAY, SUSPENSION (ML) NASAL DAILY PRN
COMMUNITY
Start: 2023-02-06

## 2023-07-17 RX ORDER — CLINDAMYCIN HYDROCHLORIDE 300 MG/1
300 CAPSULE ORAL 2 TIMES DAILY
COMMUNITY
Start: 2023-07-10 | End: 2024-03-01

## 2023-07-17 NOTE — PROGRESS NOTES
"Subjective    Reason for visit: Biologic Education, Training, and Observation    Rheumatology treatment to be initiate: Tremfya 100 mg     Rheumatology Provider: Dr. Truong    HPI: Crystal Hein is a 57 y.o. female who presents to the clinic for injection training and education. She is a new patient to me but has been following with Dr. Truong and ANTHONY Echevarria for management of her rheumatic disease. Pt is presenting with vertigo, OA, RA, PsA and Sjogren's.     Prior Rheum Therapies:   Otezla - unable to tolerate    PsA:  Dispensing pharmacy: [facility]   Current therapy: plaquenil 200 mg BID  Currently on clindamycin for vaginal infection; started 7/10 but skipped about 2 days d/t difficulty swallowing  Noticed increased difficulty swallowing dry foods and water; common issue that runs in her family and required stretching of esophagus  Follows with ENT Dr. Willard and with GI Dr. Wilkinson; stated that she will call today to make appointment with listed providers   Will return Wednesday 7/26 to complete 1st dose or try injecting at home depending on her schedule    Vaccinations:  Influenza: Discuss and recommend annually. Due next flu season starting around Sept./Oct.   PCV 20: Discuss and recommend 1 dose. Due now  Tetanus (TdaP): Discuss and recommend booster every 10 years. Due now  Shingrix: Discuss and recommend. 2 dose series 2-6 months apart. Due now  Covid: CDC recommends 2 bivalent doses at least 2 months apart in immunocompromised pts. Due now for 1st bivalent COVID dose      Declined all vaccines.    Priority Immunizations: Influenza, Shingles, Pneumonia, & COVID    Reviewed allergies and all current medications including OTC, herbals and supplements.     Objective    Vitals:    07/17/23 1455   BP: 135/73   Pulse: 86   Weight: 78 kg (172 lb)   Height: 5' 5" (1.651 m)     Lab Results   Component Value Date    HEPBSAG NON-REACTIVE 10/25/2022    HEPBCAB NON-REACTIVE 10/25/2022     Lab Results   Component Value " Date    HEPCAB NON-REACTIVE 10/25/2022     Lab Results   Component Value Date    QUANTTBGDPL NEGATIVE 10/25/2022      Lab Results   Component Value Date    WBC 6.5 05/11/2023    RBC 3.80 05/11/2023    HGB 11.9 05/11/2023    HCT 34.9 (L) 05/11/2023    MCV 91.8 05/11/2023    MCH 31.3 05/11/2023    MCHC 34.1 05/11/2023    RDW 12.4 05/11/2023     05/11/2023      Lab Results   Component Value Date     05/11/2023    K 4.2 05/11/2023     05/11/2023    CO2 27 05/11/2023    GLU 98 05/11/2023    BUN 10 05/11/2023    CREATININE 0.75 05/11/2023    CALCIUM 8.6 05/11/2023    PROT 6.2 05/11/2023    ALBUMIN 4.0 05/11/2023    BILITOT 0.6 05/11/2023    ALKPHOS 60 10/17/2018    AST 17 05/11/2023    ALT 37 (H) 05/11/2023    ANIONGAP 10 11/07/2014    EGFRNORACEVR 93 05/11/2023     Lab Results   Component Value Date    SEDRATE 2 05/11/2023    CRP 6.9 05/11/2023      Lab Results   Component Value Date    AXMOMRQF26 372 11/02/2012      Assessment/Plan    1. Injection Training/Education: New start Tremfya for psoriatic arthritis. Pt was referred to the Rheumatology pharmacist for injection education. [facility] sent biologic to pt's home and was brought in for injection. Provided pt with education (MOA, frequency, route of administration, onset of action, injection instructions and safety profile) on Tremfya. Educated patient to inject 100 mg SC at week 0, week 4, then every 8 weeks. Since patient has vaginal infection and still on antibiotics notified patient that it is better to wait until antibiotics are completed and infection resolved. Will return 7/26 for 1st dose injection, training, and observation. Encouraged pt to practice proper hand hygiene considering increased risk of infection with biologics and to avoid raw seafood/live vaccines.   Pt to make us aware if they ever experiences s/sx of an infection including fever, chills, URI symptoms or UTI symptoms.  Pt verbalized understanding instructions  Provided pt with  handouts on education about their biologic and how to administer it  Return 7/26 for injection training and observation.      2. Vaccinations: Explained importance of vaccines considering the increased risk of infections.    Vaccines needed: Influenza, PCV 20, two COVID bivalent doses, Shingles, and TdaP  Pt declined to complete any of the recommended vaccines      3. Medication Rec: Medication list reconciled per patient and EHR. Indications and dosages reviewed with patient.  Has both prevacid and protonix at home. Alternatives between but doesn't take both together  Unsure of dose of potassium but will check and update through her mychart     Follow-up scheduled on 10/3/23 with Dr. Alexi Hsu[facility]  Rheumatology Clinical Pharmacist  [facility]

## 2023-07-17 NOTE — PATIENT INSTRUCTIONS
It was a pleasure meeting you today. As a reminder, my name is Dr. Barbara Hsu. I'm the clinical pharmacist in the Rheumatology office. If you have any questions or need any assistance, please reach out to the office.    Next appointment for Tremfya Injection on Wednesday 7/26 at 9:30am

## 2023-07-24 NOTE — TELEPHONE ENCOUNTER
Per chart review, 1st dose of Tremfya was not completed at [facility] given pt had vaginal infection/ was on abx.  Pt will return to [facility] on 7/26/23 for 1st dose.  Pended refill call appropriately.

## 2023-08-03 DIAGNOSIS — E08.69 DIABETES MELLITUS DUE TO UNDERLYING CONDITION WITH OTHER SPECIFIED COMPLICATION, UNSPECIFIED WHETHER LONG TERM INSULIN USE: Primary | ICD-10-CM

## 2023-08-16 NOTE — TELEPHONE ENCOUNTER
Per chart review, pt did appear to not return to [facility] for 1st dose of Tremfya 7/26/23.  Called pt to clarify if she has started med yet  LVM

## 2023-08-21 NOTE — TELEPHONE ENCOUNTER
Spoke with pt- Tremfya initiation is pending clearance of infection.  She went to [facility] but dose was not given vaginal infection.  Pt has shingles now and a UTI.  She agreed to [facility] checking in with her in 2 weeks to assess infection status/ med appropriateness.

## 2023-09-12 NOTE — TELEPHONE ENCOUNTER
Spoke with pt- she still hasn't started Tremfya because she is still dealing with shingles and now thrush extending into the esophagus. Pt currently taking treatment for the thrush.  She plans to speak with Dr Truong's office soon to confirm the plan of care.  Pt is concerned about her recurrent infections.  I confirmed with pt she should be infection free before starting Tremfya. Pt agreed to call back in 2 weeks.

## 2023-10-03 ENCOUNTER — OFFICE VISIT (OUTPATIENT)
Dept: RHEUMATOLOGY | Facility: CLINIC | Age: 58
End: 2023-10-03
Payer: COMMERCIAL

## 2023-10-03 DIAGNOSIS — L40.50 PSA (PSORIATIC ARTHRITIS): ICD-10-CM

## 2023-10-03 DIAGNOSIS — M47.9 OSTEOARTHRITIS OF BACK: ICD-10-CM

## 2023-10-03 DIAGNOSIS — M35.00 SJOGREN'S SYNDROME, WITH UNSPECIFIED ORGAN INVOLVEMENT: ICD-10-CM

## 2023-10-03 DIAGNOSIS — D83.9 CVID (COMMON VARIABLE IMMUNODEFICIENCY): ICD-10-CM

## 2023-10-03 DIAGNOSIS — N76.0 BV (BACTERIAL VAGINOSIS): ICD-10-CM

## 2023-10-03 DIAGNOSIS — D83.9 IMMUNODEFICIENCY, COMMON VARIABLE: ICD-10-CM

## 2023-10-03 DIAGNOSIS — M35.09 SJOGREN'S SYNDROME WITH OTHER ORGAN INVOLVEMENT: ICD-10-CM

## 2023-10-03 DIAGNOSIS — K21.00 GASTROESOPHAGEAL REFLUX DISEASE WITH ESOPHAGITIS WITHOUT HEMORRHAGE: ICD-10-CM

## 2023-10-03 DIAGNOSIS — K59.00 CONSTIPATION, UNSPECIFIED CONSTIPATION TYPE: Primary | ICD-10-CM

## 2023-10-03 DIAGNOSIS — B96.89 BV (BACTERIAL VAGINOSIS): ICD-10-CM

## 2023-10-03 PROCEDURE — 99215 OFFICE O/P EST HI 40 MIN: CPT | Mod: 95,,, | Performed by: INTERNAL MEDICINE

## 2023-10-03 PROCEDURE — 99215 PR OFFICE/OUTPT VISIT, EST, LEVL V, 40-54 MIN: ICD-10-PCS | Mod: 95,,, | Performed by: INTERNAL MEDICINE

## 2023-10-03 RX ORDER — FLUCONAZOLE 150 MG/1
150 TABLET ORAL DAILY
Qty: 7 TABLET | Refills: 1 | Status: SHIPPED | OUTPATIENT
Start: 2023-10-03 | End: 2023-10-17

## 2023-10-03 RX ORDER — ERGOCALCIFEROL 1.25 MG/1
1 CAPSULE ORAL
COMMUNITY
End: 2024-03-01

## 2023-10-03 RX ORDER — HYDROXYCHLOROQUINE SULFATE 200 MG/1
1 TABLET, FILM COATED ORAL 2 TIMES DAILY
COMMUNITY
End: 2023-11-18

## 2023-10-03 RX ORDER — ERGOCALCIFEROL 1.25 MG/1
50000 CAPSULE ORAL
Qty: 4 CAPSULE | Refills: 6 | Status: SHIPPED | OUTPATIENT
Start: 2023-10-03

## 2023-10-03 RX ORDER — SPIRONOLACTONE 50 MG/1
TABLET, FILM COATED ORAL
COMMUNITY
Start: 2023-08-16

## 2023-10-03 RX ORDER — ESTRADIOL 2 MG/1
2 TABLET ORAL DAILY
COMMUNITY
End: 2024-03-01

## 2023-10-03 RX ORDER — ACYCLOVIR 800 MG/1
800 TABLET ORAL
COMMUNITY
Start: 2023-08-15

## 2023-10-03 RX ORDER — GABAPENTIN 300 MG/1
300 CAPSULE ORAL
COMMUNITY
Start: 2023-08-15 | End: 2024-03-01

## 2023-10-03 NOTE — PROGRESS NOTES
Subjective:       Patient ID: Crystal Hein is a 57 y.o. female.    Chief Complaint: No chief complaint on file.      F/u: pt is a 57 she has  ra and sjogren's on plaquenil,  she has started on mounjaro.Pain is located in multiple joints, both shoulder(s), both elbow(s), both wrist(s), both MCP(s): 1st, 2nd, 3rd, 4th and 5th, both PIP(s): 1st, 2nd, 3rd, 4th and 5th, both DIP(s): 1st and 2nd, both hip(s), both knee(s) and both MTP(s): 1st, 2nd, 3rd, 4th and 5th, is described as aching, pulsating, shooting and throbbing, and is constant, moderate . Severe neck pain she has a mass over her C spine, decrease rom upper movement and stays inflammed.      Review of Systems   Constitutional:  Positive for activity change and chills. Negative for appetite change, diaphoresis, fever and unexpected weight change.   HENT:  Positive for trouble swallowing. Negative for congestion, dental problem, ear discharge, ear pain, facial swelling, mouth sores, nosebleeds, postnasal drip, rhinorrhea, sinus pressure, sneezing, sore throat, tinnitus and voice change.    Eyes:  Negative for photophobia, pain, discharge, redness and itching.   Respiratory:  Negative for apnea, cough, chest tightness, shortness of breath and wheezing.    Cardiovascular:  Positive for leg swelling. Negative for chest pain and palpitations.   Gastrointestinal:  Positive for abdominal pain. Negative for abdominal distention, constipation, diarrhea, nausea and vomiting.   Endocrine: Negative for cold intolerance, heat intolerance, polydipsia and polyuria.   Genitourinary:  Negative for decreased urine volume, difficulty urinating, dysuria, flank pain, frequency, genital sores, hematuria and urgency.   Musculoskeletal:  Positive for arthralgias, back pain, joint swelling, neck pain and neck stiffness. Negative for gait problem.   Skin:  Negative for pallor, rash and wound.   Allergic/Immunologic: Negative for immunocompromised state.   Neurological:  Positive  for weakness and headaches. Negative for dizziness, tremors and numbness.   Hematological:  Negative for adenopathy. Does not bruise/bleed easily.   Psychiatric/Behavioral:  Negative for sleep disturbance. The patient is not nervous/anxious.          Objective:     There were no vitals taken for this visit.     Physical Exam   Constitutional: She is oriented to person, place, and time.   Neurological: She is alert and oriented to person, place, and time.   Psychiatric: Mood, affect and judgment normal.           Results for orders placed or performed in visit on 05/11/23   C-Reactive Protein   Result Value Ref Range    CRP 6.9 <8.0 mg/L   Comprehensive Metabolic Panel   Result Value Ref Range    Glucose 98 65 - 99 mg/dL    BUN 10 7 - 25 mg/dL    Creatinine 0.75 0.50 - 1.03 mg/dL    eGFR 93 > OR = 60 mL/min/1.73m2    BUN/Creatinine Ratio NOT APPLICABLE 6 - 22 (calc)    Sodium 138 135 - 146 mmol/L    Potassium 4.2 3.5 - 5.3 mmol/L    Chloride 104 98 - 110 mmol/L    CO2 27 20 - 32 mmol/L    Calcium 8.6 8.6 - 10.4 mg/dL    Total Protein 6.2 6.1 - 8.1 g/dL    Albumin 4.0 3.6 - 5.1 g/dL    Globulin, Total 2.2 1.9 - 3.7 g/dL (calc)    Albumin/Globulin Ratio 1.8 1.0 - 2.5 (calc)    Total Bilirubin 0.6 0.2 - 1.2 mg/dL    Alkaline Phosphatase 64 37 - 153 U/L    AST 17 10 - 35 U/L    ALT 37 (H) 6 - 29 U/L   CBC Auto Differential   Result Value Ref Range    WBC 6.5 3.8 - 10.8 Thousand/uL    RBC 3.80 3.80 - 5.10 Million/uL    Hemoglobin 11.9 11.7 - 15.5 g/dL    Hematocrit 34.9 (L) 35.0 - 45.0 %    MCV 91.8 80.0 - 100.0 fL    MCH 31.3 27.0 - 33.0 pg    MCHC 34.1 32.0 - 36.0 g/dL    RDW 12.4 11.0 - 15.0 %    Platelets 233 140 - 400 Thousand/uL    MPV 10.3 7.5 - 12.5 fL    Neutrophils, Abs 3,530 1,500 - 7,800 cells/uL    Lymph # 2,373 850 - 3,900 cells/uL    Mono # 462 200 - 950 cells/uL    Eos # 117 15 - 500 cells/uL    Baso # 20 0 - 200 cells/uL    Neutrophils Relative 54.3 %    Lymph % 36.5 %    Mono % 7.1 %    Eosinophil % 1.8  %    Basophil % 0.3 %   Gamma GT   Result Value Ref Range    GGT 57 3 - 70 U/L   SALEEM Screen w/Reflex   Result Value Ref Range    SALEEM POSITIVE (A) NEGATIVE   Sjogrens syndrome-A extractable nuclear antibody   Result Value Ref Range    Anti-SSA Antibody <1.0 NEG <1.0 NEG AI   Sjogrens syndrome-B extractable nuclear antibody   Result Value Ref Range    Anti-SSB Antibody 1.0 POS (A) <1.0 NEG AI   Rheumatoid Factor   Result Value Ref Range    Rheumatoid Factor <14 <14 IU/mL   LIPASE   Result Value Ref Range    Lipase 44 7 - 60 U/L   AMYLASE   Result Value Ref Range    Amylase 25 21 - 101 U/L   Sedimentation Rate   Result Value Ref Range    Sed Rate 2 < OR = 30 mm/h   Antinuclear antibodies, titer and pattern   Result Value Ref Range    SALEEM Titer 1:40 (H) titer    SALEEM Pattern Nuclear, Dense Fine Speckled (A)      reviewed labs with patient during this visit       Assessment:          Encounter Diagnoses   Name Primary?    Constipation, unspecified constipation type Yes    Sjogren's syndrome with other organ involvement     Immunodeficiency, common variable     Osteoarthritis of back     BV (bacterial vaginosis)     PSA (psoriatic arthritis)     CVID (common variable immunodeficiency)                  Plan:     Diagnoses and all orders for this visit:    Constipation, unspecified constipation type  -     linaCLOtide (LINZESS) 72 mcg Cap capsule; Take 1 capsule (72 mcg total) by mouth before breakfast.  -     boric acid (PH-D) 600 mg vaginal suppository; Place 1 suppository (600 mg total) vaginally every evening. for 10 days  -     fluconazole (DIFLUCAN) 150 MG Tab; Take 1 tablet (150 mg total) by mouth once daily. for 14 days  -     Sedimentation rate; Future  -     C-Reactive Protein; Future  -     Comprehensive Metabolic Panel; Future  -     CBC Auto Differential; Future  -     Anti-Thyroglobulin Antibody; Future  -     T4, Free; Future  -     Thyroid Peroxidase Antibody; Future  -     TSH; Future  -     T3, Free;  Future  -     AMYLASE; Future  -     LIPASE; Future  -     Sedimentation rate  -     C-Reactive Protein  -     Comprehensive Metabolic Panel  -     CBC Auto Differential  -     Anti-Thyroglobulin Antibody  -     T4, Free  -     Thyroid Peroxidase Antibody  -     TSH  -     T3, Free  -     AMYLASE  -     LIPASE    Sjogren's syndrome with other organ involvement  -     boric acid (PH-D) 600 mg vaginal suppository; Place 1 suppository (600 mg total) vaginally every evening. for 10 days  -     fluconazole (DIFLUCAN) 150 MG Tab; Take 1 tablet (150 mg total) by mouth once daily. for 14 days  -     Sedimentation rate; Future  -     C-Reactive Protein; Future  -     Comprehensive Metabolic Panel; Future  -     CBC Auto Differential; Future  -     Anti-Thyroglobulin Antibody; Future  -     T4, Free; Future  -     Thyroid Peroxidase Antibody; Future  -     TSH; Future  -     T3, Free; Future  -     AMYLASE; Future  -     LIPASE; Future  -     Lymphocyte Profile II; Future  -     IgA; Future  -     Humoral Immune Eval (Pneumo Serotypes) With H. Flu; Future  -     IgM; Future  -     IgG; Future  -     IgG 1, 2, 3, and 4; Future  -     Sedimentation rate  -     C-Reactive Protein  -     Comprehensive Metabolic Panel  -     CBC Auto Differential  -     Anti-Thyroglobulin Antibody  -     T4, Free  -     Thyroid Peroxidase Antibody  -     TSH  -     T3, Free  -     AMYLASE  -     LIPASE  -     Lymphocyte Profile II  -     IgA  -     Humoral Immune Eval (Pneumo Serotypes) With H. Flu  -     IgM  -     IgG  -     IgG 1, 2, 3, and 4    Immunodeficiency, common variable  -     boric acid (PH-D) 600 mg vaginal suppository; Place 1 suppository (600 mg total) vaginally every evening. for 10 days  -     fluconazole (DIFLUCAN) 150 MG Tab; Take 1 tablet (150 mg total) by mouth once daily. for 14 days  -     Sedimentation rate; Future  -     C-Reactive Protein; Future  -     Comprehensive Metabolic Panel; Future  -     CBC Auto Differential;  Future  -     Anti-Thyroglobulin Antibody; Future  -     T4, Free; Future  -     Thyroid Peroxidase Antibody; Future  -     TSH; Future  -     T3, Free; Future  -     AMYLASE; Future  -     LIPASE; Future  -     Lymphocyte Profile II; Future  -     IgA; Future  -     Humoral Immune Eval (Pneumo Serotypes) With H. Flu; Future  -     IgM; Future  -     IgG; Future  -     IgG 1, 2, 3, and 4; Future  -     Sedimentation rate  -     C-Reactive Protein  -     Comprehensive Metabolic Panel  -     CBC Auto Differential  -     Anti-Thyroglobulin Antibody  -     T4, Free  -     Thyroid Peroxidase Antibody  -     TSH  -     T3, Free  -     AMYLASE  -     LIPASE  -     Lymphocyte Profile II  -     IgA  -     Humoral Immune Eval (Pneumo Serotypes) With H. Flu  -     IgM  -     IgG  -     IgG 1, 2, 3, and 4    Osteoarthritis of back  -     boric acid (PH-D) 600 mg vaginal suppository; Place 1 suppository (600 mg total) vaginally every evening. for 10 days  -     fluconazole (DIFLUCAN) 150 MG Tab; Take 1 tablet (150 mg total) by mouth once daily. for 14 days  -     Sedimentation rate; Future  -     C-Reactive Protein; Future  -     Comprehensive Metabolic Panel; Future  -     CBC Auto Differential; Future  -     Anti-Thyroglobulin Antibody; Future  -     T4, Free; Future  -     Thyroid Peroxidase Antibody; Future  -     TSH; Future  -     T3, Free; Future  -     AMYLASE; Future  -     LIPASE; Future  -     Lymphocyte Profile II; Future  -     IgA; Future  -     Humoral Immune Eval (Pneumo Serotypes) With H. Flu; Future  -     IgM; Future  -     IgG; Future  -     IgG 1, 2, 3, and 4; Future  -     Sedimentation rate  -     C-Reactive Protein  -     Comprehensive Metabolic Panel  -     CBC Auto Differential  -     Anti-Thyroglobulin Antibody  -     T4, Free  -     Thyroid Peroxidase Antibody  -     TSH  -     T3, Free  -     AMYLASE  -     LIPASE  -     Lymphocyte Profile II  -     IgA  -     Humoral Immune Eval (Pneumo Serotypes)  With H. Flu  -     IgM  -     IgG  -     IgG 1, 2, 3, and 4    BV (bacterial vaginosis)  -     boric acid (PH-D) 600 mg vaginal suppository; Place 1 suppository (600 mg total) vaginally every evening. for 10 days  -     fluconazole (DIFLUCAN) 150 MG Tab; Take 1 tablet (150 mg total) by mouth once daily. for 14 days  -     Sedimentation rate; Future  -     C-Reactive Protein; Future  -     Comprehensive Metabolic Panel; Future  -     CBC Auto Differential; Future  -     Anti-Thyroglobulin Antibody; Future  -     T4, Free; Future  -     Thyroid Peroxidase Antibody; Future  -     TSH; Future  -     T3, Free; Future  -     AMYLASE; Future  -     LIPASE; Future  -     Lymphocyte Profile II; Future  -     IgA; Future  -     Humoral Immune Eval (Pneumo Serotypes) With H. Flu; Future  -     IgM; Future  -     IgG; Future  -     IgG 1, 2, 3, and 4; Future  -     Sedimentation rate  -     C-Reactive Protein  -     Comprehensive Metabolic Panel  -     CBC Auto Differential  -     Anti-Thyroglobulin Antibody  -     T4, Free  -     Thyroid Peroxidase Antibody  -     TSH  -     T3, Free  -     AMYLASE  -     LIPASE  -     Lymphocyte Profile II  -     IgA  -     Humoral Immune Eval (Pneumo Serotypes) With H. Flu  -     IgM  -     IgG  -     IgG 1, 2, 3, and 4    PSA (psoriatic arthritis)  -     Sedimentation rate; Future  -     C-Reactive Protein; Future  -     Comprehensive Metabolic Panel; Future  -     CBC Auto Differential; Future  -     Anti-Thyroglobulin Antibody; Future  -     T4, Free; Future  -     Thyroid Peroxidase Antibody; Future  -     TSH; Future  -     T3, Free; Future  -     AMYLASE; Future  -     LIPASE; Future  -     Lymphocyte Profile II; Future  -     IgA; Future  -     Humoral Immune Eval (Pneumo Serotypes) With H. Flu; Future  -     IgM; Future  -     IgG; Future  -     IgG 1, 2, 3, and 4; Future  -     Sedimentation rate  -     C-Reactive Protein  -     Comprehensive Metabolic Panel  -     CBC Auto  Differential  -     Anti-Thyroglobulin Antibody  -     T4, Free  -     Thyroid Peroxidase Antibody  -     TSH  -     T3, Free  -     AMYLASE  -     LIPASE  -     Lymphocyte Profile II  -     IgA  -     Humoral Immune Eval (Pneumo Serotypes) With H. Flu  -     IgM  -     IgG  -     IgG 1, 2, 3, and 4    CVID (common variable immunodeficiency)  -     Sedimentation rate; Future  -     C-Reactive Protein; Future  -     Comprehensive Metabolic Panel; Future  -     CBC Auto Differential; Future  -     Anti-Thyroglobulin Antibody; Future  -     T4, Free; Future  -     Thyroid Peroxidase Antibody; Future  -     TSH; Future  -     T3, Free; Future  -     AMYLASE; Future  -     LIPASE; Future  -     Lymphocyte Profile II; Future  -     IgA; Future  -     Humoral Immune Eval (Pneumo Serotypes) With H. Flu; Future  -     IgM; Future  -     IgG; Future  -     IgG 1, 2, 3, and 4; Future  -     Sedimentation rate  -     C-Reactive Protein  -     Comprehensive Metabolic Panel  -     CBC Auto Differential  -     Anti-Thyroglobulin Antibody  -     T4, Free  -     Thyroid Peroxidase Antibody  -     TSH  -     T3, Free  -     AMYLASE  -     LIPASE  -     Lymphocyte Profile II  -     IgA  -     Humoral Immune Eval (Pneumo Serotypes) With H. Flu  -     IgM  -     IgG  -     IgG 1, 2, 3, and 4      1.labs ordered  2.continue plaquenil  3 start tremfya    The patient location is: home  The chief complaint leading to consultation is: sjogren's, psa    Visit type: audiovisual    Face to Face time with patient: 52   minutes of total time spent on the encounter, which includes face to face time and non-face to face time preparing to see the patient (eg, review of tests), Obtaining and/or reviewing separately obtained history, Documenting clinical information in the electronic or other health record, Independently interpreting results (not separately reported) and communicating results to the patient/family/caregiver, or Care coordination (not  separately reported).         Each patient to whom he or she provides medical services by telemedicine is:  (1) informed of the relationship between the physician and patient and the respective role of any other health care provider with respect to management of the patient; and (2) notified that he or she may decline to receive medical services by telemedicine and may withdraw from such care at any time.    Notes:

## 2023-10-10 NOTE — TELEPHONE ENCOUNTER
Spoke with pt- she said Dr Truong does not want her to start Tremfya yet.  Pt said she is in the process of getting more labs and tests done before starting the med. She asked for [facility] f/u call in 1 month.

## 2023-11-17 DIAGNOSIS — D83.9 COMMON VARIABLE IMMUNODEFICIENCY, UNSPECIFIED: ICD-10-CM

## 2023-11-17 DIAGNOSIS — M35.00 SJOGREN SYNDROME, UNSPECIFIED: ICD-10-CM

## 2023-11-18 RX ORDER — HYDROXYCHLOROQUINE SULFATE 200 MG/1
200 TABLET, FILM COATED ORAL 2 TIMES DAILY
Qty: 180 TABLET | Refills: 3 | Status: SHIPPED | OUTPATIENT
Start: 2023-11-18

## 2023-11-20 NOTE — TELEPHONE ENCOUNTER
Specialty Pharmacy - Refill Coordination  Specialty Pharmacy - Clinical Intervention    Specialty Medication Orders Linked to Encounter      Flowsheet Row Most Recent Value   Medication #1 guselkumab (TREMFYA) 100 mg/mL [facility] (Order#[id number], Rx#[id number])   Medication #2 guselkumab (TREMFYA) 100 mg/mL [facility] (Order#[id number], Rx#[id number])          Refill Questions - Documented Responses      Flowsheet Row Most Recent Value   Patient Availability and HIPAA Verification    Does patient want to proceed with activity? Unable to Reach          We have had multiple attempts to the patient and have been unsuccessful to reach the patient. We will stop reaching out to the patient but in the event that the patient needs the med and contacts us, we will communicate and begin dispensing for the patient. At your next visit with the patient, please review the importance of being in contact with our specialty pharmacy as a part of our care team.    Interventions added this encounter   Medication requires monitoring - guselkumab (TREMFYA) 100 mg/mL [facility]     Hernando Michaud, PharmD  [facility]  [address] [place] [id number]  Phone: [phone]  Fax: [phone]

## 2023-11-29 DIAGNOSIS — M06.9 RHEUMATOID ARTHRITIS, UNSPECIFIED: ICD-10-CM

## 2023-11-29 DIAGNOSIS — R63.5 ABNORMAL WEIGHT GAIN: ICD-10-CM

## 2023-12-01 RX ORDER — MONTELUKAST SODIUM 10 MG/1
TABLET ORAL
Qty: 30 TABLET | Refills: 6 | Status: SHIPPED | OUTPATIENT
Start: 2023-12-01 | End: 2024-04-03

## 2023-12-04 LAB
ALBUMIN SERPL-MCNC: 4.5 G/DL (ref 3.6–5.1)
ALBUMIN/GLOB SERPL: 2.1 (CALC) (ref 1–2.5)
ALP SERPL-CCNC: 66 U/L (ref 37–153)
ALT SERPL-CCNC: 15 U/L (ref 6–29)
AMYLASE SERPL-CCNC: 41 U/L (ref 21–101)
AST SERPL-CCNC: 14 U/L (ref 10–35)
BASOPHILS # BLD AUTO: 49 CELLS/UL (ref 0–200)
BASOPHILS NFR BLD AUTO: 0.9 %
BILIRUB SERPL-MCNC: 0.4 MG/DL (ref 0.2–1.2)
BUN SERPL-MCNC: 13 MG/DL (ref 7–25)
BUN/CREAT SERPL: NORMAL (CALC) (ref 6–22)
CALCIUM SERPL-MCNC: 9.2 MG/DL (ref 8.6–10.4)
CHLORIDE SERPL-SCNC: 102 MMOL/L (ref 98–110)
CO2 SERPL-SCNC: 31 MMOL/L (ref 20–32)
CREAT SERPL-MCNC: 0.89 MG/DL (ref 0.5–1.03)
CRP SERPL-MCNC: 3.8 MG/L
EGFR: 75 ML/MIN/1.73M2
EOSINOPHIL # BLD AUTO: 130 CELLS/UL (ref 15–500)
EOSINOPHIL NFR BLD AUTO: 2.4 %
ERYTHROCYTE [DISTWIDTH] IN BLOOD BY AUTOMATED COUNT: 12.7 % (ref 11–15)
ERYTHROCYTE [SEDIMENTATION RATE] IN BLOOD BY WESTERGREN METHOD: 11 MM/H
GLOBULIN SER CALC-MCNC: 2.1 G/DL (CALC) (ref 1.9–3.7)
GLUCOSE SERPL-MCNC: 91 MG/DL (ref 65–99)
HCT VFR BLD AUTO: 35.4 % (ref 35–45)
HGB BLD-MCNC: 12.1 G/DL (ref 11.7–15.5)
IGA SERPL-MCNC: 253 MG/DL (ref 47–310)
IGG SERPL-MCNC: 850 MG/DL (ref 600–1640)
IGM SERPL-MCNC: 52 MG/DL (ref 50–300)
LYMPHOCYTES # BLD AUTO: 2117 CELLS/UL (ref 850–3900)
LYMPHOCYTES NFR BLD AUTO: 39.2 %
MCH RBC QN AUTO: 31.9 PG (ref 27–33)
MCHC RBC AUTO-ENTMCNC: 34.2 G/DL (ref 32–36)
MCV RBC AUTO: 93.4 FL (ref 80–100)
MONOCYTES # BLD AUTO: 340 CELLS/UL (ref 200–950)
MONOCYTES NFR BLD AUTO: 6.3 %
NEUTROPHILS # BLD AUTO: 2765 CELLS/UL (ref 1500–7800)
NEUTROPHILS NFR BLD AUTO: 51.2 %
PLATELET # BLD AUTO: 286 THOUSAND/UL (ref 140–400)
PMV BLD REES-ECKER: 10.5 FL (ref 7.5–12.5)
POTASSIUM SERPL-SCNC: 4.4 MMOL/L (ref 3.5–5.3)
PROT SERPL-MCNC: 6.6 G/DL (ref 6.1–8.1)
RBC # BLD AUTO: 3.79 MILLION/UL (ref 3.8–5.1)
SODIUM SERPL-SCNC: 139 MMOL/L (ref 135–146)
T4 FREE SERPL-MCNC: 0.9 NG/DL (ref 0.8–1.8)
THYROGLOB AB SERPL-ACNC: <1 IU/ML
THYROPEROXIDASE AB SERPL-ACNC: <1 IU/ML
TSH SERPL-ACNC: 2.3 MIU/L (ref 0.4–4.5)
WBC # BLD AUTO: 5.4 THOUSAND/UL (ref 3.8–10.8)

## 2023-12-05 LAB
IGG SERPL-MCNC: 732 MG/DL (ref 600–1640)
IGG1 SER-MCNC: 402 MG/DL (ref 382–929)
IGG2 SER-MCNC: 264 MG/DL (ref 241–700)
IGG3 SER-MCNC: 81 MG/DL (ref 22–178)
IGG4 SER-MCNC: 10.8 MG/DL (ref 4–86)

## 2023-12-06 LAB
C DIPHTHERIAE AB SER IA-ACNC: 0.12 IU/ML
C TETANI TOXOID AB SER-ACNC: 1.04 IU/ML
CD19 CELLS # BLD: 222 CELLS/UL (ref 110–660)
CD19 CELLS NFR BLD: 10 % (ref 6–29)
CD3 CELLS # BLD: 1929 CELLS/UL (ref 840–3060)
CD3 CELLS NFR BLD: 84 % (ref 57–85)
CD3+CD4+ CELLS # BLD: 958 CELLS/UL (ref 490–1740)
CD3+CD4+ CELLS NFR BLD: 42 % (ref 30–61)
CD3+CD4+ CELLS/CD3+CD8+ CLL BLD: 0.97 % (ref 0.86–5)
CD3+CD8+ CELLS # BLD: 984 CELLS/UL (ref 180–1170)
CD3+CD8+ CELLS NFR BLD: 43 % (ref 12–42)
HAEM INFLU B IGG SER IA-MCNC: 1.74 MCG/ML
LIPASE SERPL-CCNC: 66 U/L (ref 7–60)
LYMPHOCYTES # BLD AUTO: 2284 CELLS/UL (ref 850–3900)
S PN DA SERO 19F IGG SER-MCNC: 7.7 UG/ML
S PNEUM DA 1 IGG SER-MCNC: 3.9 UG/ML
S PNEUM DA 14 IGG SER-MCNC: 0.9
S PNEUM DA 18C IGG SER-MCNC: 2.5
S PNEUM DA 19A IGG SER-MCNC: 2.1 UG/ML
S PNEUM DA 23F IGG SER-MCNC: 1.7 UG/ML
S PNEUM DA 3 IGG SER-MCNC: 0.5 UG/ML
S PNEUM DA 4 IGG SER-MCNC: <0.3 UG/ML
S PNEUM DA 5 IGG SER-MCNC: 4.1 UG/ML
S PNEUM DA 6A IGG SER-MCNC: <0.3 UG/ML
S PNEUM DA 6B IGG SER-MCNC: 0.8 UG/ML
S PNEUM DA 7F IGG SER-MCNC: 0.8 UG/ML
S PNEUM DA 9V IGG SER-MCNC: 1.9 UG/ML
T3FREE SERPL-MCNC: 3.6 PG/ML (ref 2.3–4.2)

## 2024-02-07 ENCOUNTER — OFFICE VISIT (OUTPATIENT)
Dept: RHEUMATOLOGY | Facility: CLINIC | Age: 59
End: 2024-02-07
Payer: COMMERCIAL

## 2024-02-07 VITALS
HEART RATE: 105 BPM | HEIGHT: 65 IN | DIASTOLIC BLOOD PRESSURE: 79 MMHG | SYSTOLIC BLOOD PRESSURE: 150 MMHG | BODY MASS INDEX: 28.99 KG/M2 | WEIGHT: 174 LBS

## 2024-02-07 DIAGNOSIS — L40.9 PSORIASIS: ICD-10-CM

## 2024-02-07 DIAGNOSIS — E08.69 DIABETES MELLITUS DUE TO UNDERLYING CONDITION WITH OTHER SPECIFIED COMPLICATION, UNSPECIFIED WHETHER LONG TERM INSULIN USE: ICD-10-CM

## 2024-02-07 DIAGNOSIS — E09.9 STEROID-INDUCED DIABETES MELLITUS, SEQUELA: ICD-10-CM

## 2024-02-07 DIAGNOSIS — M32.9 SYSTEMIC LUPUS ERYTHEMATOSUS, UNSPECIFIED SLE TYPE, UNSPECIFIED ORGAN INVOLVEMENT STATUS: ICD-10-CM

## 2024-02-07 DIAGNOSIS — M35.09 SJOGREN'S SYNDROME WITH OTHER ORGAN INVOLVEMENT: Primary | ICD-10-CM

## 2024-02-07 DIAGNOSIS — T38.0X5S STEROID-INDUCED DIABETES MELLITUS, SEQUELA: ICD-10-CM

## 2024-02-07 DIAGNOSIS — R09.81 SINUS CONGESTION: ICD-10-CM

## 2024-02-07 DIAGNOSIS — B37.2 YEAST DERMATITIS: ICD-10-CM

## 2024-02-07 PROCEDURE — 99999 PR PBB SHADOW E&M-EST. PATIENT-LVL III: CPT | Mod: PBBFAC,,, | Performed by: INTERNAL MEDICINE

## 2024-02-07 PROCEDURE — 1159F MED LIST DOCD IN RCRD: CPT | Mod: CPTII,S$GLB,, | Performed by: INTERNAL MEDICINE

## 2024-02-07 PROCEDURE — 3078F DIAST BP <80 MM HG: CPT | Mod: CPTII,S$GLB,, | Performed by: INTERNAL MEDICINE

## 2024-02-07 PROCEDURE — 3008F BODY MASS INDEX DOCD: CPT | Mod: CPTII,S$GLB,, | Performed by: INTERNAL MEDICINE

## 2024-02-07 PROCEDURE — 1160F RVW MEDS BY RX/DR IN RCRD: CPT | Mod: CPTII,S$GLB,, | Performed by: INTERNAL MEDICINE

## 2024-02-07 PROCEDURE — 99215 OFFICE O/P EST HI 40 MIN: CPT | Mod: 25,S$GLB,, | Performed by: INTERNAL MEDICINE

## 2024-02-07 PROCEDURE — 96372 THER/PROPH/DIAG INJ SC/IM: CPT | Mod: S$GLB,,, | Performed by: INTERNAL MEDICINE

## 2024-02-07 PROCEDURE — 3077F SYST BP >= 140 MM HG: CPT | Mod: CPTII,S$GLB,, | Performed by: INTERNAL MEDICINE

## 2024-02-07 RX ORDER — FLUCONAZOLE 150 MG/1
150 TABLET ORAL DAILY
Qty: 7 TABLET | Refills: 3 | Status: SHIPPED | OUTPATIENT
Start: 2024-02-07 | End: 2024-02-14

## 2024-02-07 RX ORDER — AZITHROMYCIN 250 MG/1
TABLET, FILM COATED ORAL
Qty: 6 TABLET | Refills: 0 | Status: SHIPPED | OUTPATIENT
Start: 2024-02-07 | End: 2024-02-12

## 2024-02-07 RX ORDER — CLINDAMYCIN HYDROCHLORIDE 300 MG/1
300 CAPSULE ORAL 2 TIMES DAILY
Qty: 14 CAPSULE | Refills: 0 | Status: SHIPPED | OUTPATIENT
Start: 2024-02-07 | End: 2024-02-14

## 2024-02-07 RX ORDER — CALCIPOTRIENE, BETAMETHASONE DIPROPIONATE 50; .643 UG/G; MG/G
OINTMENT TOPICAL DAILY
Qty: 100 G | Refills: 3 | Status: SHIPPED | OUTPATIENT
Start: 2024-02-07 | End: 2024-08-05

## 2024-02-07 RX ORDER — TRIAMCINOLONE ACETONIDE 1 MG/G
OINTMENT TOPICAL
COMMUNITY
Start: 2024-01-25

## 2024-02-07 RX ORDER — NYSTATIN 100000 [USP'U]/ML
4 SUSPENSION ORAL 4 TIMES DAILY
Qty: 160 EACH | Refills: 0 | Status: SHIPPED | OUTPATIENT
Start: 2024-02-07 | End: 2024-02-17

## 2024-02-07 RX ORDER — CEFTRIAXONE 1 G/1
1 INJECTION, POWDER, FOR SOLUTION INTRAMUSCULAR; INTRAVENOUS
Status: COMPLETED | OUTPATIENT
Start: 2024-02-07 | End: 2024-02-07

## 2024-02-07 RX ORDER — FLUCONAZOLE 150 MG/1
150 TABLET ORAL ONCE
COMMUNITY
Start: 2023-12-09

## 2024-02-07 RX ORDER — BELIMUMAB 200 MG/ML
200 SOLUTION SUBCUTANEOUS
Qty: 4 ML | Refills: 11 | Status: ACTIVE | OUTPATIENT
Start: 2024-02-07 | End: 2025-02-06

## 2024-02-07 RX ORDER — METHYLPREDNISOLONE 4 MG/1
4 TABLET ORAL DAILY
Qty: 10 TABLET | Refills: 0 | Status: SHIPPED | OUTPATIENT
Start: 2024-02-07 | End: 2024-02-17

## 2024-02-07 RX ORDER — ORAL SEMAGLUTIDE 3 MG/1
3 TABLET ORAL DAILY
Qty: 30 TABLET | Refills: 2 | Status: SHIPPED | OUTPATIENT
Start: 2024-02-07 | End: 2024-02-15

## 2024-02-07 RX ADMIN — CEFTRIAXONE 1 G: 1 INJECTION, POWDER, FOR SOLUTION INTRAMUSCULAR; INTRAVENOUS at 10:02

## 2024-02-07 NOTE — PROGRESS NOTES
Subjective:     Patient ID:  Crystal Hein    Chief Complaint:  Disease Management     History of Present Illness:  Pt is a 58 y.o. female she has  ra, sleand sjogren's on plaquenil, she had a GI issues and her gallbladder removed  and contributes the ozempic blood  glucose is normal, pt had pancreatitis and uti. She was severe vertigo with she tried the hormone pellets she had a rash stopped pellets.Pain is located in multiple joints, both shoulder(s), both elbow(s), both wrist(s), both MCP(s): 1st, 2nd, 3rd, 4th and 5th, both PIP(s): 1st, 2nd, 3rd, 4th and 5th, both DIP(s): 1st and 2nd, both hip(s), both knee(s) and both MTP(s): 1st, 2nd, 3rd, 4th and 5th, is described as aching, pulsating, shooting and throbbing, and is constant, moderate . Severe neck pain she has a mass over her C spine, decrease rom upper movement and stays inflammed. y:   - Diagnosis/es:  - Positive serologies:  - Infectious screening labs:  - Previous Treatments:  - Current Treatments:     Interval History:   Hospitalization since last office visit: Yes -     Patient Active Problem List    Diagnosis Date Noted    Systemic lupus erythematosus 02/14/2024    Sjogren's syndrome 04/20/2018    Chest pain radiating to arm 07/10/2015    Immunodeficiency, common variable 07/10/2015    Neck pain, bilateral 07/10/2015    Sjogren's disease 11/07/2014    Reactive arthritis 11/07/2014    Osteoarthritis of back 11/07/2014     Past Surgical History:   Procedure Laterality Date    ADENOIDECTOMY      AUGMENTATION OF BREAST      BREAST BIOPSY      HYSTERECTOMY      knee  for ACL      OOPHORECTOMY      LA EXPLORATORY OF ABDOMEN      TONSILLECTOMY      tummy tuck       Social History     Tobacco Use    Smoking status: Never    Smokeless tobacco: Never   Substance Use Topics    Alcohol use: No    Drug use: No     No family history on file.  Review of patient's allergies indicates:   Allergen Reactions    Dexamethasone Other (See Comments)     Paranoia and  anxiety    Imitrex [sumatriptan succinate] Anaphylaxis    Azulfidine [sulfasalazine]     Flagyl [metronidazole hcl] Dermatitis    Pyridium [phenazopyridine]        Review of Systems   Review of Systems   Constitutional:  Positive for chills, fatigue and unexpected weight change. Negative for activity change, appetite change, diaphoresis and fever.   HENT:  Positive for mouth sores and trouble swallowing. Negative for congestion, dental problem, ear discharge, ear pain, facial swelling, nosebleeds, postnasal drip, rhinorrhea, sinus pressure, sneezing, sore throat, tinnitus and voice change.    Eyes:  Negative for photophobia, pain, discharge, redness and itching.   Respiratory:  Positive for cough. Negative for apnea, chest tightness, shortness of breath and wheezing.    Cardiovascular:  Positive for leg swelling. Negative for chest pain and palpitations.   Gastrointestinal:  Positive for abdominal pain. Negative for abdominal distention, constipation, diarrhea, nausea and vomiting.   Endocrine: Negative for cold intolerance, heat intolerance, polydipsia and polyuria.   Genitourinary:  Negative for decreased urine volume, difficulty urinating, dysuria, flank pain, frequency, genital sores, hematuria and urgency.   Musculoskeletal:  Positive for arthralgias, back pain, gait problem, joint swelling, myalgias, neck pain and neck stiffness.   Skin:  Positive for rash. Negative for pallor and wound.   Allergic/Immunologic: Negative for immunocompromised state.   Neurological:  Negative for dizziness, tremors, numbness and headaches.   Hematological:  Negative for adenopathy. Does not bruise/bleed easily.   Psychiatric/Behavioral:  Negative for sleep disturbance. The patient is not nervous/anxious.         Current Medications:  Current Outpatient Medications   Medication Instructions    acyclovir (ZOVIRAX) 800 mg, Oral, 5 times daily    azelastine (ASTELIN) 137 mcg (0.1 %) nasal spray use 1-2 sprays IN EACH NOSTRIL TWICE  DAILY FOR allergies    BENLYSTA 200 mg, Subcutaneous, Every 7 days    bisacodyL (DULCOLAX) 5 mg EC tablet TAKE TWO TABLETS BY MOUTH as directed for ONE day    boric acid (PH-D) 600 mg, Vaginal, Nightly    calcipotriene-betamethasone (TACLONEX) ointment Topical (Top), Daily    clindamycin (CLEOCIN) 300 mg, Oral, 2 times daily    dicyclomine (BENTYL) 20 mg tablet TAKE ONE TABLET BY MOUTH EVERY 6 HOURS    ergocalciferol (ERGOCALCIFEROL) 50,000 unit Cap 1 capsule, Oral, Every 7 days    ergocalciferol (ERGOCALCIFEROL) 50,000 Units, Oral, Every 7 days    estradioL (ESTRACE) 2 MG tablet 2 tablets, Oral, Daily    fluconazole (DIFLUCAN) 150 mg, Oral, Once    fluticasone propionate (FLONASE) 50 mcg/actuation nasal spray 2 sprays, Each Nostril, Daily PRN    furosemide (LASIX) 10-20 mg, Oral, Daily PRN    gabapentin (NEURONTIN) 300 mg, Oral    hydroxychloroquine (PLAQUENIL) 200 mg, Oral, 2 times daily    ketorolac (TORADOL) 10 mg tablet TAKE ONE TABLET BY MOUTH EVERY 6 HOURS FOR 5 DAYS    lansoprazole (PREVACID) 30 MG capsule TAKE ONE CAPSULE BY MOUTH ONCE DAILY    levocetirizine (XYZAL) 5 mg, Oral, Nightly    linaCLOtide (LINZESS) 72 mcg, Oral, Before breakfast    meclizine (ANTIVERT) 25 mg tablet TAKE ONE TABLET BY MOUTH THREE TIMES DAILY AS NEEDED    methylPREDNISolone (MEDROL) 4 mg, Oral, Daily    montelukast (SINGULAIR) 10 mg tablet TAKE ONE TABLET BY MOUTH EVERY EVENING    nystatin (MYCOSTATIN) 100,000 unit/mL suspension 5 mLs, Oral, 4 times daily    nystatin (MYCOSTATIN) 400,000 Units, Oral, 4 times daily    ondansetron (ZOFRAN-ODT) 4 mg, Oral, Every 6 hours PRN    pantoprazole (PROTONIX) 40 MG tablet TAKE ONE TABLET BY MOUTH once daily    potassium chloride (KLOR-CON) 10 MEQ TbSR 10 mEq, Oral, Daily PRN    potassium chloride SA (K-DUR,KLOR-CON) 20 MEQ tablet 20 mEq, Oral, Daily    promethazine (PHENERGAN) 25 mg, Oral, Every 4 hours    spironolactone (ALDACTONE) 50 MG tablet Oral    sucralfate (CARAFATE) 1 g, Oral, 4  "times daily    triamcinolone acetonide 0.1% (KENALOG) 0.1 % ointment Topical (Top)    TRULICITY 0.75 mg, Subcutaneous, Every 7 days    valACYclovir (VALTREX) 1,000 mg, Oral, 2 times daily         Objective:     Vitals:    02/07/24 0850   BP: (!) 150/79   Pulse: 105   Weight: 78.9 kg (174 lb)   Height: 5' 5" (1.651 m)   PainSc:   7   PainLoc: Generalized      Body mass index is 28.96 kg/m².     Physical Examinations:  Physical Exam   Constitutional: She is oriented to person, place, and time.   HENT:   Head: Normocephalic and atraumatic.   Mouth/Throat: Oropharynx is clear and moist.   Eyes: Pupils are equal, round, and reactive to light.   Neck: No thyromegaly present.   Cardiovascular: Normal rate, regular rhythm and normal heart sounds. Exam reveals no gallop and no friction rub.   No murmur heard.  Pulmonary/Chest: Breath sounds normal. She has no wheezes. She has no rales. She exhibits no tenderness.   Abdominal: There is no abdominal tenderness. There is no rebound and no guarding.   Musculoskeletal:         General: Swelling and deformity present.      Right shoulder: Tenderness present.      Left shoulder: Tenderness present.      Right elbow: Normal.      Left elbow: Tenderness present.      Right wrist: Tenderness present.      Left wrist: Tenderness present.      Cervical back: Neck supple.      Right knee: Effusion present. Tenderness present.      Left knee: Effusion present. Tenderness present.      Right lower leg: Edema present.      Left lower leg: Edema present.   Lymphadenopathy:     She has no cervical adenopathy.   Neurological: She is alert and oriented to person, place, and time. Gait normal.   Skin: Rash noted. There is erythema. There is pallor.   Psychiatric: Mood, memory, affect and judgment normal.   Vitals reviewed.      Right Side Rheumatological Exam     Examination finds the elbow normal.    The patient is tender to palpation of the shoulder, wrist, knee, 1st PIP, 1st MCP, 2nd PIP, 2nd " MCP, 3rd PIP, 3rd MCP, 4th PIP, 4th MCP, 5th PIP and 5th MCP    Shoulder Exam   Tenderness Location: acromion    Range of Motion   Active abduction:  abnormal   Adduction: abnormal  Sensation: normal    Knee Exam   Tenderness Location: medial joint line and LCL  Patellofemoral Crepitus: positive  Effusion: positive  Sensation: normal    Hip Exam   Tenderness Location: posterior and lateral  Sensation: normal    Elbow/Wrist Exam   Tenderness Location: no tenderness  Sensation: normal    Left Side Rheumatological Exam     The patient is tender to palpation of the shoulder, elbow, wrist, knee, 1st PIP, 1st MCP, 2nd PIP, 2nd MCP, 3rd PIP, 3rd MCP, 4th PIP, 4th MCP, 5th PIP and 5th MCP.    Shoulder Exam   Tenderness Location: acromion    Range of Motion   Active abduction:  abnormal   Sensation: normal    Knee Exam   Tenderness Location: lateral joint line and medial joint line    Patellofemoral Crepitus: positive  Effusion: positive  Sensation: normal    Hip Exam   Tenderness Location: posterior and lateral  Sensation: normal    Elbow/Wrist Exam   Sensation: normal      Back/Neck Exam   Tenderness Right paramedian tenderness of the Upper C-Spine, Upper T-Spine, Upper L-Spine and SI Joint.Left paramedian tenderness of the Upper C-Spine, Upper T-Spine, SI Joint and Upper L-Spine.         Disease Assessment Scores:  Patient's Global Assessment of arthritis (0-10): 6  Physician's Global Assessment of arthritis (0-10): 6  Number of Tender Joints (0-28): 6  Number of Swollen Joints (0-28): 9    There is currently no information documented on the homunculus. Go to the Rheumatology activity and complete the homunculus joint exam.         2/6/2024     9:43 AM   Rapid3 Question Responses and Scores   MDHAQ Score 0.9   Psychologic Score 0   Pain Score 7   When you awakened in the morning OVER THE LAST WEEK, did you feel stiff? Yes   If Yes, please indicate the number of hours until you are as limber as you will be for the day 1  "  Fatigue Score 5   Global Health Score 5   RAPID3 Score 5       Monitoring Lab Results:  Lab Results   Component Value Date    WBC 5.4 12/02/2023    RBC 3.79 (L) 12/02/2023    HGB 12.1 12/02/2023    HCT 35.4 12/02/2023    MCV 93.4 12/02/2023    MCH 31.9 12/02/2023    MCHC 34.2 12/02/2023    RDW 12.7 12/02/2023     12/02/2023        Lab Results   Component Value Date     12/08/2023    K 3.9 12/08/2023     12/02/2023    CO2 24 12/08/2023    GLU 91 12/02/2023    BUN 11 12/08/2023    CREATININE 0.66 12/08/2023    CALCIUM 8.7 (L) 12/08/2023    PROT 7.0 12/08/2023    ALBUMIN 4.3 12/08/2023    BILITOT 0.6 12/08/2023    ALKPHOS 89 12/08/2023    AST 21 12/08/2023    ALT 26 12/08/2023    ANIONGAP 11 12/08/2023    EGFRNORACEVR 75 12/02/2023       Lab Results   Component Value Date    SEDRATE 11 12/02/2023    CRP 3.8 12/02/2023        Lab Results   Component Value Date    VRZPGKHI65 372 11/02/2012        Lab Results   Component Value Date    CHOL 183 07/11/2015    HDL 48 07/11/2015    LDLCALC 114 07/11/2015    TRIG 105 07/11/2015       Lab Results   Component Value Date    RF <14 05/11/2023    CCPANTIBODIE <0.5 11/07/2014     Lab Results   Component Value Date    ANASCREEN Negative <1:160 11/07/2014    DSDNA 3 01/26/2022    SMRNPAB <1.0 NEG 01/26/2022    LQW35AX <1.0 NEG 01/26/2022     No results found for: "HLABB27"    Infectious Disease Screening:  Lab Results   Component Value Date    HEPBSAG NON-REACTIVE 10/25/2022    HEPBCAB NON-REACTIVE 10/25/2022     Lab Results   Component Value Date    HEPCAB NON-REACTIVE 10/25/2022     Lab Results   Component Value Date    QUANTTBGDPL NEGATIVE 10/25/2022     No results found for: "QUANTIFERON", "SVCMT", "QUANTAGVALUE", "QUANTNILVALU", "QUANTMITOGEN", "QFTTBAG", "QINT"     Imaging: DEXA, Xrays, MRIs, CTs, etc    Old & Outside Medical Records:  Reviewed old and all outside medical records available in Care Everywhere    Current Medication Changes:  Medication List " with Changes/Refills   New Medications    BELIMUMAB (BENLYSTA) 200 MG/ML ATIN    Inject 1 mL (200 mg total) into the skin every 7 days.    CALCIPOTRIENE-BETAMETHASONE (TACLONEX) OINTMENT    Apply topically once daily.    DULAGLUTIDE (TRULICITY) 0.75 MG/0.5 ML PEN INJECTOR    Inject 0.75 mg into the skin every 7 days.    METHYLPREDNISOLONE (MEDROL) 4 MG TAB    Take 1 tablet (4 mg total) by mouth once daily. for 10 days    NYSTATIN (MYCOSTATIN) 100,000 UNIT/ML SUSPENSION    Take 4 mLs (400,000 Units total) by mouth 4 (four) times daily. for 10 days   Current Medications    ACYCLOVIR (ZOVIRAX) 800 MG TAB    Take 800 mg by mouth 5 (five) times daily.    AZELASTINE (ASTELIN) 137 MCG (0.1 %) NASAL SPRAY    use 1-2 sprays IN EACH NOSTRIL TWICE DAILY FOR allergies    BISACODYL (DULCOLAX) 5 MG EC TABLET    TAKE TWO TABLETS BY MOUTH as directed for ONE day    BORIC ACID (PH-D) 600 MG VAGINAL SUPPOSITORY    Place 1 suppository (600 mg total) vaginally every evening. for 10 days    CLINDAMYCIN (CLEOCIN) 300 MG CAPSULE    Take 300 mg by mouth 2 (two) times daily.    DICYCLOMINE (BENTYL) 20 MG TABLET    TAKE ONE TABLET BY MOUTH EVERY 6 HOURS    ERGOCALCIFEROL (ERGOCALCIFEROL) 50,000 UNIT CAP    Take 1 capsule by mouth every 7 days.    ERGOCALCIFEROL (ERGOCALCIFEROL) 50,000 UNIT CAP    Take 1 capsule (50,000 Units total) by mouth every 7 days.    ESTRADIOL (ESTRACE) 2 MG TABLET    Take 2 tablets by mouth once daily.    FLUCONAZOLE (DIFLUCAN) 150 MG TAB    Take 150 mg by mouth once.    FLUTICASONE PROPIONATE (FLONASE) 50 MCG/ACTUATION NASAL SPRAY    2 sprays by Each Nostril route daily as needed.    FUROSEMIDE (LASIX) 20 MG TABLET    Take 10-20 mg by mouth daily as needed.    GABAPENTIN (NEURONTIN) 300 MG CAPSULE    Take 300 mg by mouth.    HYDROXYCHLOROQUINE (PLAQUENIL) 200 MG TABLET    TAKE ONE TABLET BY MOUTH TWICE DAILY    KETOROLAC (TORADOL) 10 MG TABLET    TAKE ONE TABLET BY MOUTH EVERY 6 HOURS FOR 5 DAYS    LANSOPRAZOLE  (PREVACID) 30 MG CAPSULE    TAKE ONE CAPSULE BY MOUTH ONCE DAILY    LEVOCETIRIZINE (XYZAL) 5 MG TABLET    Take 1 tablet (5 mg total) by mouth every evening.    LINACLOTIDE (LINZESS) 72 MCG CAP CAPSULE    Take 1 capsule (72 mcg total) by mouth before breakfast.    MECLIZINE (ANTIVERT) 25 MG TABLET    TAKE ONE TABLET BY MOUTH THREE TIMES DAILY AS NEEDED    MONTELUKAST (SINGULAIR) 10 MG TABLET    TAKE ONE TABLET BY MOUTH EVERY EVENING    NYSTATIN (MYCOSTATIN) 100,000 UNIT/ML SUSPENSION    Take 5 mLs by mouth 4 (four) times daily.    ONDANSETRON (ZOFRAN-ODT) 4 MG TBDL    Take 1 tablet (4 mg total) by mouth every 6 (six) hours as needed.    PANTOPRAZOLE (PROTONIX) 40 MG TABLET    TAKE ONE TABLET BY MOUTH once daily    POTASSIUM CHLORIDE (KLOR-CON) 10 MEQ TBSR    Take 10 mEq by mouth daily as needed.    POTASSIUM CHLORIDE SA (K-DUR,KLOR-CON) 20 MEQ TABLET    Take 1 tablet (20 mEq total) by mouth once daily.    PROMETHAZINE (PHENERGAN) 25 MG TABLET    Take 1 tablet (25 mg total) by mouth every 4 (four) hours.    SPIRONOLACTONE (ALDACTONE) 50 MG TABLET    Take by mouth.    SUCRALFATE (CARAFATE) 1 GRAM TABLET    Take 1 tablet (1 g total) by mouth 4 (four) times daily.    TRIAMCINOLONE ACETONIDE 0.1% (KENALOG) 0.1 % OINTMENT    Apply topically.    VALACYCLOVIR (VALTREX) 1000 MG TABLET    Take 1 tablet (1,000 mg total) by mouth 2 (two) times daily.   Discontinued Medications    GUSELKUMAB (TREMFYA) 100 MG/ML ATIN    Inject 1 ml (100mg) into the skin on week 0 and week 4    GUSELKUMAB (TREMFYA) 100 MG/ML ATIN    Inject 100 mg into the skin every 8 weeks.    TIRZEPATIDE 10 MG/0.5 ML PNIJ    Inject 10 mg into the skin every 7 days.     2 yr ago  (1/26/22) 2 yr ago  (3/19/21) 3 yr ago  (2/1/21)         Glucose 65 - 99 mg/dL 91 98 CM 81 CM  103 High  CM 97  High  CM   Comment:          Assessment:     Pt is a 58 y.o. female with psoriatic arthritis, systemic lupus erythematosus, and sjogren's. The patient has not achieved  clinical remission. Plan is to continue hydroxychloroquine (PLAQUENIL) and start belimumab (BENLYSTA).      Plan:      Encounter Diagnoses   Name Primary?    Sjogren's syndrome with other organ involvement Yes    Yeast dermatitis     Systemic lupus erythematosus, unspecified SLE type, unspecified organ involvement status     Diabetes mellitus due to underlying condition with other specified complication, unspecified whether long term insulin use     Sinus congestion     Psoriasis     Steroid-induced diabetes mellitus, sequela      Diagnoses and all orders for this visit:    Sjogren's syndrome with other organ involvement  -     fluconazole (DIFLUCAN) 150 MG Tab; Take 1 tablet (150 mg total) by mouth once daily. for 7 days  -     calcipotriene-betamethasone (TACLONEX) ointment; Apply topically once daily.  -     Complement, Total; Future  -     C4 Complement; Future  -     C3 Complement; Future  -     Sedimentation rate; Future  -     Sjogrens syndrome-B extractable nuclear antibody; Future  -     Sjogrens syndrome-A extractable nuclear antibody; Future  -     Comprehensive Metabolic Panel; Future  -     CBC Auto Differential; Future  -     Anti-Smith Antibody; Future  -     Anti-Scleroderma Antibody; Future  -     Anti-Histone Antibody; Future  -     Anti-DNA Ab, Double-Stranded; Future  -     Anti Sm/RNP Antibody; Future  -     SALEEM Screen w/Reflex; Future  -     Urinalysis; Future  -     Protein/Creatinine Ratio, Urine; Future  -     Hepatitis C Antibody; Future  -     Quantiferon Gold TB; Future  -     Hepatitis B Surface Antigen; Future  -     Hepatitis B Surface Antibody, Qual/Quant; Future  -     Hepatitis B Core Antibody, Total; Future  -     Hepatitis B Surface Ab, Qualitative; Future  -     nystatin (MYCOSTATIN) 100,000 unit/mL suspension; Take 4 mLs (400,000 Units total) by mouth 4 (four) times daily. for 10 days  -     Sedimentation rate; Future  -     C-Reactive Protein; Future  -     Comprehensive  Metabolic Panel; Future  -     CBC Auto Differential; Future  -     Anti-Thyroglobulin Antibody; Future  -     T4, Free; Future  -     Thyroid Peroxidase Antibody; Future  -     TSH; Future  -     T3, Free; Future  -     Sedimentation rate  -     C-Reactive Protein  -     Comprehensive Metabolic Panel  -     CBC Auto Differential  -     Anti-Thyroglobulin Antibody  -     T4, Free  -     Thyroid Peroxidase Antibody  -     TSH  -     T3, Free    Yeast dermatitis  -     fluconazole (DIFLUCAN) 150 MG Tab; Take 1 tablet (150 mg total) by mouth once daily. for 7 days  -     Complement, Total; Future  -     C4 Complement; Future  -     C3 Complement; Future  -     Sedimentation rate; Future  -     Sjogrens syndrome-B extractable nuclear antibody; Future  -     Sjogrens syndrome-A extractable nuclear antibody; Future  -     Comprehensive Metabolic Panel; Future  -     CBC Auto Differential; Future  -     Anti-Smith Antibody; Future  -     Anti-Scleroderma Antibody; Future  -     Anti-Histone Antibody; Future  -     Anti-DNA Ab, Double-Stranded; Future  -     Anti Sm/RNP Antibody; Future  -     SALEEM Screen w/Reflex; Future  -     Urinalysis; Future  -     Protein/Creatinine Ratio, Urine; Future  -     Hepatitis C Antibody; Future  -     Quantiferon Gold TB; Future  -     Hepatitis B Surface Antigen; Future  -     Hepatitis B Surface Antibody, Qual/Quant; Future  -     Hepatitis B Core Antibody, Total; Future  -     Hepatitis B Surface Ab, Qualitative; Future  -     nystatin (MYCOSTATIN) 100,000 unit/mL suspension; Take 4 mLs (400,000 Units total) by mouth 4 (four) times daily. for 10 days  -     Sedimentation rate; Future  -     C-Reactive Protein; Future  -     Comprehensive Metabolic Panel; Future  -     CBC Auto Differential; Future  -     Anti-Thyroglobulin Antibody; Future  -     T4, Free; Future  -     Thyroid Peroxidase Antibody; Future  -     TSH; Future  -     T3, Free; Future  -     Sedimentation rate  -      C-Reactive Protein  -     Comprehensive Metabolic Panel  -     CBC Auto Differential  -     Anti-Thyroglobulin Antibody  -     T4, Free  -     Thyroid Peroxidase Antibody  -     TSH  -     T3, Free    Systemic lupus erythematosus, unspecified SLE type, unspecified organ involvement status  -     belimumab (BENLYSTA) 200 mg/mL AtIn; Inject 1 mL (200 mg total) into the skin every 7 days.  -     Complement, Total; Future  -     C4 Complement; Future  -     C3 Complement; Future  -     Sedimentation rate; Future  -     Sjogrens syndrome-B extractable nuclear antibody; Future  -     Sjogrens syndrome-A extractable nuclear antibody; Future  -     Comprehensive Metabolic Panel; Future  -     CBC Auto Differential; Future  -     Anti-Smith Antibody; Future  -     Anti-Scleroderma Antibody; Future  -     Anti-Histone Antibody; Future  -     Anti-DNA Ab, Double-Stranded; Future  -     Anti Sm/RNP Antibody; Future  -     SALEEM Screen w/Reflex; Future  -     Urinalysis; Future  -     Protein/Creatinine Ratio, Urine; Future  -     Hepatitis C Antibody; Future  -     Quantiferon Gold TB; Future  -     Hepatitis B Surface Antigen; Future  -     Hepatitis B Surface Antibody, Qual/Quant; Future  -     Hepatitis B Core Antibody, Total; Future  -     Hepatitis B Surface Ab, Qualitative; Future  -     nystatin (MYCOSTATIN) 100,000 unit/mL suspension; Take 4 mLs (400,000 Units total) by mouth 4 (four) times daily. for 10 days  -     Sedimentation rate; Future  -     C-Reactive Protein; Future  -     Comprehensive Metabolic Panel; Future  -     CBC Auto Differential; Future  -     Anti-Thyroglobulin Antibody; Future  -     T4, Free; Future  -     Thyroid Peroxidase Antibody; Future  -     TSH; Future  -     T3, Free; Future  -     Sedimentation rate  -     C-Reactive Protein  -     Comprehensive Metabolic Panel  -     CBC Auto Differential  -     Anti-Thyroglobulin Antibody  -     T4, Free  -     Thyroid Peroxidase Antibody  -     TSH  -      T3, Free    Diabetes mellitus due to underlying condition with other specified complication, unspecified whether long term insulin use  -     Discontinue: semaglutide (RYBELSUS) 3 mg tablet; Take 1 tablet (3 mg total) by mouth once daily.  -     Complement, Total; Future  -     C4 Complement; Future  -     C3 Complement; Future  -     Sedimentation rate; Future  -     Sjogrens syndrome-B extractable nuclear antibody; Future  -     Sjogrens syndrome-A extractable nuclear antibody; Future  -     Comprehensive Metabolic Panel; Future  -     CBC Auto Differential; Future  -     Anti-Smith Antibody; Future  -     Anti-Scleroderma Antibody; Future  -     Anti-Histone Antibody; Future  -     Anti-DNA Ab, Double-Stranded; Future  -     Anti Sm/RNP Antibody; Future  -     SALEEM Screen w/Reflex; Future  -     Urinalysis; Future  -     Protein/Creatinine Ratio, Urine; Future  -     Hepatitis C Antibody; Future  -     Quantiferon Gold TB; Future  -     Hepatitis B Surface Antigen; Future  -     Hepatitis B Surface Antibody, Qual/Quant; Future  -     Hepatitis B Core Antibody, Total; Future  -     Hepatitis B Surface Ab, Qualitative; Future  -     Sedimentation rate; Future  -     C-Reactive Protein; Future  -     Comprehensive Metabolic Panel; Future  -     CBC Auto Differential; Future  -     Anti-Thyroglobulin Antibody; Future  -     T4, Free; Future  -     Thyroid Peroxidase Antibody; Future  -     TSH; Future  -     T3, Free; Future  -     Sedimentation rate  -     C-Reactive Protein  -     Comprehensive Metabolic Panel  -     CBC Auto Differential  -     Anti-Thyroglobulin Antibody  -     T4, Free  -     Thyroid Peroxidase Antibody  -     TSH  -     T3, Free    Sinus congestion  -     cefTRIAXone injection 1 g  -     clindamycin (CLEOCIN) 300 MG capsule; Take 1 capsule (300 mg total) by mouth 2 (two) times a day. for 7 days  -     methylPREDNISolone (MEDROL) 4 MG Tab; Take 1 tablet (4 mg total) by mouth once daily. for 10  days  -     Complement, Total; Future  -     C4 Complement; Future  -     C3 Complement; Future  -     Sedimentation rate; Future  -     Sjogrens syndrome-B extractable nuclear antibody; Future  -     Sjogrens syndrome-A extractable nuclear antibody; Future  -     Comprehensive Metabolic Panel; Future  -     CBC Auto Differential; Future  -     Anti-Smith Antibody; Future  -     Anti-Scleroderma Antibody; Future  -     Anti-Histone Antibody; Future  -     Anti-DNA Ab, Double-Stranded; Future  -     Anti Sm/RNP Antibody; Future  -     SALEEM Screen w/Reflex; Future  -     Urinalysis; Future  -     Protein/Creatinine Ratio, Urine; Future  -     Hepatitis C Antibody; Future  -     Quantiferon Gold TB; Future  -     Hepatitis B Surface Antigen; Future  -     Hepatitis B Surface Antibody, Qual/Quant; Future  -     Hepatitis B Core Antibody, Total; Future  -     Hepatitis B Surface Ab, Qualitative; Future  -     azithromycin (Z-ADENIKE) 250 MG tablet; Take 2 tablets by mouth on day 1; Take 1 tablet by mouth on days 2-5  -     Sedimentation rate; Future  -     C-Reactive Protein; Future  -     Comprehensive Metabolic Panel; Future  -     CBC Auto Differential; Future  -     Anti-Thyroglobulin Antibody; Future  -     T4, Free; Future  -     Thyroid Peroxidase Antibody; Future  -     TSH; Future  -     T3, Free; Future  -     Sedimentation rate  -     C-Reactive Protein  -     Comprehensive Metabolic Panel  -     CBC Auto Differential  -     Anti-Thyroglobulin Antibody  -     T4, Free  -     Thyroid Peroxidase Antibody  -     TSH  -     T3, Free    Psoriasis  -     calcipotriene-betamethasone (TACLONEX) ointment; Apply topically once daily.  -     Sedimentation rate; Future  -     C-Reactive Protein; Future  -     Comprehensive Metabolic Panel; Future  -     CBC Auto Differential; Future  -     Anti-Thyroglobulin Antibody; Future  -     T4, Free; Future  -     Thyroid Peroxidase Antibody; Future  -     TSH; Future  -     T3, Free;  Future  -     Sedimentation rate  -     C-Reactive Protein  -     Comprehensive Metabolic Panel  -     CBC Auto Differential  -     Anti-Thyroglobulin Antibody  -     T4, Free  -     Thyroid Peroxidase Antibody  -     TSH  -     T3, Free    Steroid-induced diabetes mellitus, sequela  -     dulaglutide (TRULICITY) 0.75 mg/0.5 mL pen injector; Inject 0.75 mg into the skin every 7 days.      1.start benlysta  2. Labs   3. Cleocin and medrol  4.shots today  5.f/u 4 to 5 months  6. Pt has a sig h/o elevated blood glucose and has a family hx of CAD w/ h/o mi      More than 50% of the  40 minute encounter was spent face to face counseling the patient regarding current status and future plan of care as well as side effects  of the medications. All questions were answered to patient's satisfaction also includes  non-face to face time preparing to see the patient (eg, review of tests), Obtaining and/or reviewing separately obtained history, Documenting clinical information in the electronic or other health record, Independently interpreting results

## 2024-02-08 ENCOUNTER — PATIENT MESSAGE (OUTPATIENT)
Dept: RHEUMATOLOGY | Facility: CLINIC | Age: 59
End: 2024-02-08
Payer: COMMERCIAL

## 2024-02-09 NOTE — TELEPHONE ENCOUNTER
Provider asked for update on rybelsus endyt patient. Notified her that it was denied d/t patient not having diabetes. Glucose normal in CMPs. Per provider, no need to appeal    Staff,  Please update patient. Thanks!

## 2024-02-12 ENCOUNTER — PATIENT MESSAGE (OUTPATIENT)
Dept: RHEUMATOLOGY | Facility: CLINIC | Age: 59
End: 2024-02-12
Payer: COMMERCIAL

## 2024-02-14 PROBLEM — M35.00 SJOGREN'S SYNDROME: Status: ACTIVE | Noted: 2018-04-20

## 2024-02-14 PROBLEM — M32.9 SYSTEMIC LUPUS ERYTHEMATOSUS: Status: ACTIVE | Noted: 2024-02-14

## 2024-02-15 RX ORDER — DULAGLUTIDE 0.75 MG/.5ML
0.75 INJECTION, SOLUTION SUBCUTANEOUS
Qty: 4 PEN | Refills: 11 | Status: SHIPPED | OUTPATIENT
Start: 2024-02-15 | End: 2024-02-21

## 2024-02-21 RX ORDER — TIRZEPATIDE 2.5 MG/.5ML
2.5 INJECTION, SOLUTION SUBCUTANEOUS
Qty: 4 PEN | Refills: 5 | Status: SHIPPED | OUTPATIENT
Start: 2024-02-21 | End: 2024-08-19

## 2024-04-02 DIAGNOSIS — R63.5 ABNORMAL WEIGHT GAIN: ICD-10-CM

## 2024-04-02 DIAGNOSIS — M06.9 RHEUMATOID ARTHRITIS, UNSPECIFIED: ICD-10-CM

## 2024-04-03 RX ORDER — MONTELUKAST SODIUM 10 MG/1
TABLET ORAL
Qty: 30 TABLET | Refills: 6 | Status: SHIPPED | OUTPATIENT
Start: 2024-04-03

## 2024-06-14 DIAGNOSIS — M02.30 REACTIVE ARTHRITIS: ICD-10-CM

## 2024-06-14 DIAGNOSIS — D83.9 CVID (COMMON VARIABLE IMMUNODEFICIENCY): ICD-10-CM

## 2024-06-14 DIAGNOSIS — M35.00 SJOGREN'S SYNDROME, WITH UNSPECIFIED ORGAN INVOLVEMENT: ICD-10-CM

## 2024-06-14 DIAGNOSIS — K21.00 GASTROESOPHAGEAL REFLUX DISEASE WITH ESOPHAGITIS WITHOUT HEMORRHAGE: ICD-10-CM

## 2024-06-16 RX ORDER — MECLIZINE HYDROCHLORIDE 25 MG/1
TABLET ORAL
Qty: 90 TABLET | Refills: 3 | Status: SHIPPED | OUTPATIENT
Start: 2024-06-16

## 2024-07-29 ENCOUNTER — OFFICE VISIT (OUTPATIENT)
Dept: RHEUMATOLOGY | Facility: CLINIC | Age: 59
End: 2024-07-29
Payer: COMMERCIAL

## 2024-07-29 VITALS
DIASTOLIC BLOOD PRESSURE: 67 MMHG | HEIGHT: 65 IN | BODY MASS INDEX: 29.99 KG/M2 | HEART RATE: 96 BPM | WEIGHT: 180 LBS | SYSTOLIC BLOOD PRESSURE: 130 MMHG

## 2024-07-29 DIAGNOSIS — D83.9 CVID (COMMON VARIABLE IMMUNODEFICIENCY): Primary | ICD-10-CM

## 2024-07-29 DIAGNOSIS — L40.50 PSA (PSORIATIC ARTHRITIS): ICD-10-CM

## 2024-07-29 DIAGNOSIS — M35.00 SJOGREN'S SYNDROME, WITH UNSPECIFIED ORGAN INVOLVEMENT: ICD-10-CM

## 2024-07-29 DIAGNOSIS — M47.9 OSTEOARTHRITIS OF BACK: ICD-10-CM

## 2024-07-29 DIAGNOSIS — E11.69 TYPE 2 DIABETES MELLITUS WITH OTHER SPECIFIED COMPLICATION, UNSPECIFIED WHETHER LONG TERM INSULIN USE: ICD-10-CM

## 2024-07-29 DIAGNOSIS — D83.9 IMMUNODEFICIENCY, COMMON VARIABLE: ICD-10-CM

## 2024-07-29 PROCEDURE — 3008F BODY MASS INDEX DOCD: CPT | Mod: CPTII,S$GLB,, | Performed by: INTERNAL MEDICINE

## 2024-07-29 PROCEDURE — 1160F RVW MEDS BY RX/DR IN RCRD: CPT | Mod: CPTII,S$GLB,, | Performed by: INTERNAL MEDICINE

## 2024-07-29 PROCEDURE — 3078F DIAST BP <80 MM HG: CPT | Mod: CPTII,S$GLB,, | Performed by: INTERNAL MEDICINE

## 2024-07-29 PROCEDURE — 1159F MED LIST DOCD IN RCRD: CPT | Mod: CPTII,S$GLB,, | Performed by: INTERNAL MEDICINE

## 2024-07-29 PROCEDURE — 3075F SYST BP GE 130 - 139MM HG: CPT | Mod: CPTII,S$GLB,, | Performed by: INTERNAL MEDICINE

## 2024-07-29 PROCEDURE — 99215 OFFICE O/P EST HI 40 MIN: CPT | Mod: S$GLB,,, | Performed by: INTERNAL MEDICINE

## 2024-07-29 PROCEDURE — 99999 PR PBB SHADOW E&M-EST. PATIENT-LVL III: CPT | Mod: PBBFAC,,, | Performed by: INTERNAL MEDICINE

## 2024-07-29 RX ORDER — ORAL SEMAGLUTIDE 3 MG/1
3 TABLET ORAL DAILY
Qty: 30 TABLET | Refills: 5 | Status: SHIPPED | OUTPATIENT
Start: 2024-07-29 | End: 2025-01-25

## 2024-07-29 ASSESSMENT — ROUTINE ASSESSMENT OF PATIENT INDEX DATA (RAPID3)
PAIN SCORE: 9.5
PATIENT GLOBAL ASSESSMENT SCORE: 1
TOTAL RAPID3 SCORE: 4.28
PSYCHOLOGICAL DISTRESS SCORE: 1.1
MDHAQ FUNCTION SCORE: 0.7

## 2024-08-20 DIAGNOSIS — D83.9 CVID (COMMON VARIABLE IMMUNODEFICIENCY): ICD-10-CM

## 2024-08-20 DIAGNOSIS — M35.00 SJOGREN'S SYNDROME, WITH UNSPECIFIED ORGAN INVOLVEMENT: ICD-10-CM

## 2024-08-20 DIAGNOSIS — M06.9 RHEUMATOID ARTHRITIS: ICD-10-CM

## 2024-08-20 DIAGNOSIS — K21.00 GASTROESOPHAGEAL REFLUX DISEASE WITH ESOPHAGITIS: ICD-10-CM

## 2024-08-21 RX ORDER — KETOROLAC TROMETHAMINE 10 MG/1
TABLET, FILM COATED ORAL
Qty: 20 TABLET | Refills: 3 | Status: SHIPPED | OUTPATIENT
Start: 2024-08-21

## 2024-08-22 ENCOUNTER — TELEPHONE (OUTPATIENT)
Dept: RHEUMATOLOGY | Facility: CLINIC | Age: 59
End: 2024-08-22
Payer: COMMERCIAL

## 2024-08-22 NOTE — TELEPHONE ENCOUNTER
----- Message from Sydnee Chahal sent at 8/22/2024  8:59 AM CDT -----  Contact: Pharmacy  Type:  Pharmacy Calling to Clarify an RX    Name of Caller:Pharmacy       [facility] - [place] - [address]  [address]  [place] [id number]  Phone: [phone] Fax: [phone]      Prescription Name:ketorolac (TORADOL) 10 mg tablet    What do they need to clarify?:Has the patient received the medication in office or through a IV before starting this medication     Best Call Back Number:See above     Additional Information: Please call to advise

## 2024-09-26 ENCOUNTER — TELEPHONE (OUTPATIENT)
Dept: RHEUMATOLOGY | Facility: CLINIC | Age: 59
End: 2024-09-26
Payer: COMMERCIAL

## 2024-09-26 NOTE — TELEPHONE ENCOUNTER
----- Message from Betsey Malik sent at 9/26/2024  9:40 AM CDT -----  Regarding: Needs reschedule  Type: Needs Medical Advice  Who Called:  Pt    Best Call Back Number: [phone]    Additional Information: Pt needs to reschedule appt please advsie

## 2024-10-07 ENCOUNTER — OFFICE VISIT (OUTPATIENT)
Dept: RHEUMATOLOGY | Facility: CLINIC | Age: 59
End: 2024-10-07
Payer: COMMERCIAL

## 2024-10-07 DIAGNOSIS — L40.50 PSA (PSORIATIC ARTHRITIS): ICD-10-CM

## 2024-10-07 DIAGNOSIS — M35.00 SJOGREN'S SYNDROME, WITH UNSPECIFIED ORGAN INVOLVEMENT: ICD-10-CM

## 2024-10-07 DIAGNOSIS — D83.9 CVID (COMMON VARIABLE IMMUNODEFICIENCY): Primary | ICD-10-CM

## 2024-10-07 DIAGNOSIS — M06.9 RHEUMATOID ARTHRITIS, INVOLVING UNSPECIFIED SITE, UNSPECIFIED WHETHER RHEUMATOID FACTOR PRESENT: ICD-10-CM

## 2024-10-07 DIAGNOSIS — M35.09 SJOGREN'S SYNDROME WITH OTHER ORGAN INVOLVEMENT: ICD-10-CM

## 2024-10-07 PROCEDURE — 1159F MED LIST DOCD IN RCRD: CPT | Mod: CPTII,95,, | Performed by: INTERNAL MEDICINE

## 2024-10-07 PROCEDURE — 99214 OFFICE O/P EST MOD 30 MIN: CPT | Mod: 95,,, | Performed by: INTERNAL MEDICINE

## 2024-10-07 PROCEDURE — 1160F RVW MEDS BY RX/DR IN RCRD: CPT | Mod: CPTII,95,, | Performed by: INTERNAL MEDICINE

## 2024-10-08 NOTE — PROGRESS NOTES
Subjective:     Patient ID:  Crystal Hein    Chief Complaint:  Disease Management     History of Present Illness:  Pt is a 59 y.o. female  she has  ra, sleand sjogren's on plaquenil, she has  sinusitis and tx w/ augmentin/ steroid injection,  macrobid, zpack  medrol dose pack. She now  orange. She was severe vertigo with she tried the hormone pellets she had a rash stopped pellets.Pain is located in multiple joints, both shoulder(s), both elbow(s), both wrist(s), both MCP(s): 1st, 2nd, 3rd, 4th and 5th, both PIP(s): 1st, 2nd, 3rd, 4th and 5th, both DIP(s): 1st and 2nd, both hip(s), both knee(s) and both MTP(s): 1st, 2nd, 3rd, 4th and 5th, is described as aching, pulsating, shooting and throbbing, and is constant, moderate . Severe neck pain she has a mass over her C spine, decrease rom upper movement and stays inflammed.         Rheumatologic History:   - Diagnosis/es:  - Positive serologies:  - Infectious screening labs:  - Previous Treatments:  - Current Treatments:     Interval History:   Hospitalization since last office visit: No    Patient Active Problem List    Diagnosis Date Noted    Systemic lupus erythematosus 02/14/2024    Sjogren's syndrome 04/20/2018    Chest pain radiating to arm 07/10/2015    Immunodeficiency, common variable 07/10/2015    Neck pain, bilateral 07/10/2015    Sjogren's disease 11/07/2014    Reactive arthritis 11/07/2014    Osteoarthritis of back 11/07/2014     Past Surgical History:   Procedure Laterality Date    ADENOIDECTOMY      AUGMENTATION OF BREAST      BREAST BIOPSY      HYSTERECTOMY      knee  for ACL      OOPHORECTOMY      MT EXPLORATORY OF ABDOMEN      TONSILLECTOMY      tummy tuck       Social History     Tobacco Use    Smoking status: Never    Smokeless tobacco: Never   Substance Use Topics    Alcohol use: No    Drug use: No     No family history on file.  Review of patient's allergies indicates:   Allergen Reactions    Dexamethasone Other (See Comments)     Paranoia  and anxiety    Imitrex [sumatriptan succinate] Anaphylaxis    Azulfidine [sulfasalazine]     Flagyl [metronidazole hcl] Dermatitis    Pyridium [phenazopyridine]        Review of Systems   Review of Systems   Constitutional:  Positive for chills and fatigue. Negative for activity change, appetite change, diaphoresis, fever and unexpected weight change.   HENT:  Negative for congestion, dental problem, ear discharge, ear pain, facial swelling, mouth sores, nosebleeds, postnasal drip, rhinorrhea, sinus pressure, sneezing, sore throat, tinnitus, trouble swallowing and voice change.    Eyes:  Negative for photophobia, pain, discharge, redness and itching.   Respiratory:  Positive for cough. Negative for apnea, chest tightness, shortness of breath and wheezing.    Cardiovascular:  Positive for leg swelling. Negative for chest pain and palpitations.   Gastrointestinal:  Positive for abdominal pain. Negative for abdominal distention, constipation, diarrhea, nausea and vomiting.   Endocrine: Negative for cold intolerance, heat intolerance, polydipsia and polyuria.   Genitourinary:  Negative for decreased urine volume, difficulty urinating, dysuria, flank pain, frequency, hematuria and urgency.   Musculoskeletal:  Positive for arthralgias, back pain, gait problem, joint swelling, myalgias, neck pain and neck stiffness.   Skin:  Negative for pallor, rash and wound.   Allergic/Immunologic: Negative for immunocompromised state.   Neurological:  Negative for dizziness, tremors, numbness and headaches.   Hematological:  Negative for adenopathy. Does not bruise/bleed easily.   Psychiatric/Behavioral:  Negative for sleep disturbance. The patient is not nervous/anxious.         Current Medications:  Current Outpatient Medications   Medication Instructions    acyclovir (ZOVIRAX) 800 mg, Oral, 5 times daily    azelastine (ASTELIN) 137 mcg (0.1 %) nasal spray use 1-2 sprays IN EACH NOSTRIL TWICE DAILY FOR allergies    BENLYSTA 200 mg,  Subcutaneous, Every 7 days    bisacodyL (DULCOLAX) 5 mg EC tablet TAKE TWO TABLETS BY MOUTH as directed for ONE day    boric acid (PH-D) 600 mg, Vaginal, Nightly    dicyclomine (BENTYL) 20 mg tablet TAKE ONE TABLET BY MOUTH EVERY 6 HOURS    ergocalciferol (ERGOCALCIFEROL) 50,000 Units, Oral, Every 7 days    fluconazole (DIFLUCAN) 150 mg, Oral, Once    fluticasone propionate (FLONASE) 50 mcg/actuation nasal spray 2 sprays, Each Nostril, Daily PRN    furosemide (LASIX) 10-20 mg, Oral, Daily PRN    hydroxychloroquine (PLAQUENIL) 200 mg, Oral, 2 times daily    ketorolac (TORADOL) 10 mg tablet TAKE ONE TABLET BY MOUTH EVERY 6 HOURS FOR 5 DAYS    lansoprazole (PREVACID) 30 MG capsule TAKE ONE CAPSULE BY MOUTH ONCE DAILY    levocetirizine (XYZAL) 5 mg, Oral, Nightly    meclizine (ANTIVERT) 25 mg tablet TAKE ONE TABLET BY MOUTH THREE TIMES DAILY AS NEEDED    montelukast (SINGULAIR) 10 mg tablet TAKE ONE TABLET BY MOUTH EVERY EVENING    nystatin (MYCOSTATIN) 100,000 unit/mL suspension 5 mLs, Oral, 4 times daily    ondansetron (ZOFRAN-ODT) 4 mg, Oral, Every 6 hours PRN    pantoprazole (PROTONIX) 40 MG tablet TAKE ONE TABLET BY MOUTH once daily    promethazine (PHENERGAN) 25 mg, Oral, Every 4 hours    RYBELSUS 3 mg, Oral, Daily    spironolactone (ALDACTONE) 50 MG tablet Oral    sucralfate (CARAFATE) 1 g, Oral, 4 times daily    triamcinolone acetonide 0.1% (KENALOG) 0.1 % ointment Topical (Top)    valACYclovir (VALTREX) 1,000 mg, Oral, 2 times daily         Objective:     There were no vitals filed for this visit.   There is no height or weight on file to calculate BMI.     Physical Examinations:  Physical Exam   Constitutional: She is oriented to person, place, and time.   Neurological: She is alert and oriented to person, place, and time.   Psychiatric: Mood, affect and judgment normal.        Disease Assessment Scores:  Patient's Global Assessment of arthritis (0-10): 3  Physician's Global Assessment of arthritis (0-10):  "3  Number of Tender Joints (0-28): 3  Number of Swollen Joints (0-28): 3        2/6/2024     9:43 AM   Rapid3 Question Responses and Scores   MDHAQ Score 0.9   Psychologic Score 0   Pain Score 7   When you awakened in the morning OVER THE LAST WEEK, did you feel stiff? Yes   If Yes, please indicate the number of hours until you are as limber as you will be for the day 1   Fatigue Score 5   Global Health Score 5   RAPID3 Score 5       Monitoring Lab Results:  Lab Results   Component Value Date    WBC 5.4 12/02/2023    RBC 3.79 (L) 12/02/2023    HGB 12.1 12/02/2023    HCT 35.4 12/02/2023    MCV 93.4 12/02/2023    MCH 31.9 12/02/2023    MCHC 34.2 12/02/2023    RDW 12.7 12/02/2023     12/02/2023        Lab Results   Component Value Date     12/08/2023    K 3.9 12/08/2023     12/02/2023    CO2 24 12/08/2023    GLU 91 12/02/2023    BUN 11 12/08/2023    CREATININE 0.66 12/08/2023    CALCIUM 8.7 (L) 12/08/2023    PROT 7 12/08/2023    ALBUMIN 4.3 12/08/2023    BILITOT 0.6 12/08/2023    ALKPHOS 89 12/08/2023    AST 21 12/08/2023    ALT 26 12/08/2023    ANIONGAP 11 12/08/2023    EGFRNORACEVR 75 12/02/2023       Lab Results   Component Value Date    SEDRATE 11 12/02/2023    CRP 3.8 12/02/2023        Lab Results   Component Value Date    BUSYETTL08 372 11/02/2012        Lab Results   Component Value Date    CHOL 183 07/11/2015    HDL 48 07/11/2015    LDLCALC 114 07/11/2015    TRIG 105 07/11/2015       Lab Results   Component Value Date    RF <14 05/11/2023    CCPANTIBODIE <0.5 11/07/2014     Lab Results   Component Value Date    ANASCREEN Negative <1:160 11/07/2014    DSDNA 3 01/26/2022    SMRNPAB <1.0 NEG 01/26/2022    VUZ11ST <1.0 NEG 01/26/2022     No results found for: "HLABB27"    Infectious Disease Screening:  Lab Results   Component Value Date    HEPBSAG NON-REACTIVE 10/25/2022    HEPBCAB NON-REACTIVE 10/25/2022     Lab Results   Component Value Date    HEPCAB NON-REACTIVE 10/25/2022     Lab Results " "  Component Value Date    QUANTTBGDPL NEGATIVE 10/25/2022     No results found for: "QUANTIFERON", "SVCMT", "QUANTAGVALUE", "QUANTNILVALU", "QUANTMITOGEN", "QFTTBAG", "QINT"     Imaging: DEXA, Xrays, MRIs, CTs, etc    Old & Outside Medical Records:  Reviewed old and all outside medical records available in Care Everywhere     Assessment:     Encounter Diagnoses   Name Primary?    CVID (common variable immunodeficiency) Yes    Rheumatoid arthritis, involving unspecified site, unspecified whether rheumatoid factor present     Sjogren's syndrome, with unspecified organ involvement     PSA (psoriatic arthritis)     Sjogren's syndrome with other organ involvement        Plan:      Encounter Diagnoses   Name Primary?    CVID (common variable immunodeficiency) Yes    Rheumatoid arthritis, involving unspecified site, unspecified whether rheumatoid factor present     Sjogren's syndrome, with unspecified organ involvement     PSA (psoriatic arthritis)     Sjogren's syndrome with other organ involvement      Diagnoses and all orders for this visit:    CVID (common variable immunodeficiency)  -     Clostridium difficile EIA; Future  -     H. pylori antigen, stool; Future  -     Giardia / Cryptosporidum, EIA; Future  -     Calprotectin, Stool; Future  -     Pancreatic elastase, fecal; Future  -     Lactoferrin, fecal, quantitative; Future  -     Stool culture; Future  -     Stool Exam-Ova,Cysts,Parasites; Future  -     Urine culture; Future  -     Urinalysis; Future  -     Lymphocyte Profile II; Future  -     IgA; Future  -     Humoral Immune Eval (Pneumo Serotypes) With H. Flu; Future  -     IgM; Future  -     IgG; Future  -     IgG 1, 2, 3, and 4; Future  -     Anti-Thyroglobulin Antibody; Future  -     T4, Free; Future  -     Thyroid Peroxidase Antibody; Future  -     TSH; Future  -     T3, Free; Future  -     Clostridium difficile EIA  -     H. pylori antigen, stool  -     Giardia / Cryptosporidum, EIA  -     Calprotectin, " Stool  -     Pancreatic elastase, fecal  -     Lactoferrin, fecal, quantitative  -     Stool culture  -     Stool Exam-Ova,Cysts,Parasites  -     Urine culture  -     Lymphocyte Profile II  -     IgA  -     Humoral Immune Eval (Pneumo Serotypes) With H. Flu  -     IgM  -     IgG  -     IgG 1, 2, 3, and 4  -     Anti-Thyroglobulin Antibody  -     T4, Free  -     Thyroid Peroxidase Antibody  -     TSH  -     T3, Free    Rheumatoid arthritis, involving unspecified site, unspecified whether rheumatoid factor present  -     Clostridium difficile EIA; Future  -     H. pylori antigen, stool; Future  -     Giardia / Cryptosporidum, EIA; Future  -     Calprotectin, Stool; Future  -     Pancreatic elastase, fecal; Future  -     Lactoferrin, fecal, quantitative; Future  -     Stool culture; Future  -     Stool Exam-Ova,Cysts,Parasites; Future  -     Urine culture; Future  -     Urinalysis; Future  -     Lymphocyte Profile II; Future  -     IgA; Future  -     Humoral Immune Eval (Pneumo Serotypes) With H. Flu; Future  -     IgM; Future  -     IgG; Future  -     IgG 1, 2, 3, and 4; Future  -     Anti-Thyroglobulin Antibody; Future  -     T4, Free; Future  -     Thyroid Peroxidase Antibody; Future  -     TSH; Future  -     T3, Free; Future  -     Clostridium difficile EIA  -     H. pylori antigen, stool  -     Giardia / Cryptosporidum, EIA  -     Calprotectin, Stool  -     Pancreatic elastase, fecal  -     Lactoferrin, fecal, quantitative  -     Stool culture  -     Stool Exam-Ova,Cysts,Parasites  -     Urine culture  -     Lymphocyte Profile II  -     IgA  -     Humoral Immune Eval (Pneumo Serotypes) With H. Flu  -     IgM  -     IgG  -     IgG 1, 2, 3, and 4  -     Anti-Thyroglobulin Antibody  -     T4, Free  -     Thyroid Peroxidase Antibody  -     TSH  -     T3, Free    Sjogren's syndrome, with unspecified organ involvement  -     Clostridium difficile EIA; Future  -     H. pylori antigen, stool; Future  -     Giardia /  Cryptosporidum, EIA; Future  -     Calprotectin, Stool; Future  -     Pancreatic elastase, fecal; Future  -     Lactoferrin, fecal, quantitative; Future  -     Stool culture; Future  -     Stool Exam-Ova,Cysts,Parasites; Future  -     Urine culture; Future  -     Urinalysis; Future  -     Lymphocyte Profile II; Future  -     IgA; Future  -     Humoral Immune Eval (Pneumo Serotypes) With H. Flu; Future  -     IgM; Future  -     IgG; Future  -     IgG 1, 2, 3, and 4; Future  -     Anti-Thyroglobulin Antibody; Future  -     T4, Free; Future  -     Thyroid Peroxidase Antibody; Future  -     TSH; Future  -     T3, Free; Future  -     Clostridium difficile EIA  -     H. pylori antigen, stool  -     Giardia / Cryptosporidum, EIA  -     Calprotectin, Stool  -     Pancreatic elastase, fecal  -     Lactoferrin, fecal, quantitative  -     Stool culture  -     Stool Exam-Ova,Cysts,Parasites  -     Urine culture  -     Lymphocyte Profile II  -     IgA  -     Humoral Immune Eval (Pneumo Serotypes) With H. Flu  -     IgM  -     IgG  -     IgG 1, 2, 3, and 4  -     Anti-Thyroglobulin Antibody  -     T4, Free  -     Thyroid Peroxidase Antibody  -     TSH  -     T3, Free    PSA (psoriatic arthritis)  -     Clostridium difficile EIA; Future  -     H. pylori antigen, stool; Future  -     Giardia / Cryptosporidum, EIA; Future  -     Calprotectin, Stool; Future  -     Pancreatic elastase, fecal; Future  -     Lactoferrin, fecal, quantitative; Future  -     Stool culture; Future  -     Stool Exam-Ova,Cysts,Parasites; Future  -     Urine culture; Future  -     Urinalysis; Future  -     Lymphocyte Profile II; Future  -     IgA; Future  -     Humoral Immune Eval (Pneumo Serotypes) With H. Flu; Future  -     IgM; Future  -     IgG; Future  -     IgG 1, 2, 3, and 4; Future  -     Anti-Thyroglobulin Antibody; Future  -     T4, Free; Future  -     Thyroid Peroxidase Antibody; Future  -     TSH; Future  -     T3, Free; Future  -     Clostridium  difficile EIA  -     H. pylori antigen, stool  -     Giardia / Cryptosporidum, EIA  -     Calprotectin, Stool  -     Pancreatic elastase, fecal  -     Lactoferrin, fecal, quantitative  -     Stool culture  -     Stool Exam-Ova,Cysts,Parasites  -     Urine culture  -     Lymphocyte Profile II  -     IgA  -     Humoral Immune Eval (Pneumo Serotypes) With H. Flu  -     IgM  -     IgG  -     IgG 1, 2, 3, and 4  -     Anti-Thyroglobulin Antibody  -     T4, Free  -     Thyroid Peroxidase Antibody  -     TSH  -     T3, Free    Sjogren's syndrome with other organ involvement  -     Clostridium difficile EIA; Future  -     H. pylori antigen, stool; Future  -     Giardia / Cryptosporidum, EIA; Future  -     Calprotectin, Stool; Future  -     Pancreatic elastase, fecal; Future  -     Lactoferrin, fecal, quantitative; Future  -     Stool culture; Future  -     Stool Exam-Ova,Cysts,Parasites; Future  -     Urine culture; Future  -     Urinalysis; Future  -     Lymphocyte Profile II; Future  -     IgA; Future  -     Humoral Immune Eval (Pneumo Serotypes) With H. Flu; Future  -     IgM; Future  -     IgG; Future  -     IgG 1, 2, 3, and 4; Future  -     Anti-Thyroglobulin Antibody; Future  -     T4, Free; Future  -     Thyroid Peroxidase Antibody; Future  -     TSH; Future  -     T3, Free; Future  -     Clostridium difficile EIA  -     H. pylori antigen, stool  -     Giardia / Cryptosporidum, EIA  -     Calprotectin, Stool  -     Pancreatic elastase, fecal  -     Lactoferrin, fecal, quantitative  -     Stool culture  -     Stool Exam-Ova,Cysts,Parasites  -     Urine culture  -     Lymphocyte Profile II  -     IgA  -     Humoral Immune Eval (Pneumo Serotypes) With H. Flu  -     IgM  -     IgG  -     IgG 1, 2, 3, and 4  -     Anti-Thyroglobulin Antibody  -     T4, Free  -     Thyroid Peroxidase Antibody  -     TSH  -     T3, Free        1. Labs ordered  2. F/u 4 months  3. Plaquenil refills       Follow-up 4 months      The patient  location is: home  The chief complaint leading to consultation is: sjogren's    Visit type: audiovisual    Face to Face time with patient: 30   minutes of total time spent on the encounter, which includes face to face time and non-face to face time preparing to see the patient (eg, review of tests), Obtaining and/or reviewing separately obtained history, Documenting clinical information in the electronic or other health record, Independently interpreting results (not separately reported) and communicating results to the patient/family/caregiver, or Care coordination (not separately reported).         Each patient to whom he or she provides medical services by telemedicine is:  (1) informed of the relationship between the physician and patient and the respective role of any other health care provider with respect to management of the patient; and (2) notified that he or she may decline to receive medical services by telemedicine and may withdraw from such care at any time.    Notes:

## 2024-11-06 ENCOUNTER — PATIENT MESSAGE (OUTPATIENT)
Dept: RHEUMATOLOGY | Facility: CLINIC | Age: 59
End: 2024-11-06
Payer: COMMERCIAL

## 2024-11-11 ENCOUNTER — TELEPHONE (OUTPATIENT)
Dept: RHEUMATOLOGY | Facility: CLINIC | Age: 59
End: 2024-11-11
Payer: COMMERCIAL

## 2024-11-11 DIAGNOSIS — M35.00 SJOGREN'S SYNDROME, WITH UNSPECIFIED ORGAN INVOLVEMENT: ICD-10-CM

## 2024-11-11 DIAGNOSIS — D83.9 IMMUNODEFICIENCY, COMMON VARIABLE: ICD-10-CM

## 2024-11-11 DIAGNOSIS — M35.09 SJOGREN'S SYNDROME WITH OTHER ORGAN INVOLVEMENT: ICD-10-CM

## 2024-11-11 DIAGNOSIS — D83.9 CVID (COMMON VARIABLE IMMUNODEFICIENCY): Primary | ICD-10-CM

## 2024-11-11 DIAGNOSIS — E78.9 LIPID DISORDER: ICD-10-CM

## 2024-11-11 NOTE — TELEPHONE ENCOUNTER
----- Message from Kay sent at 11/11/2024 10:28 AM CST -----  Regarding: Needs Medical Order clarification  Contact: patient at [phone]  Type: Needs Medical Order clarification    Who Called:  patient at [phone]    Additional Information: [facility] is needing a code for one of the test order for patient today. Please call and advise. Thank you

## 2024-11-13 ENCOUNTER — TELEPHONE (OUTPATIENT)
Dept: RHEUMATOLOGY | Facility: CLINIC | Age: 59
End: 2024-11-13
Payer: COMMERCIAL

## 2024-11-13 ENCOUNTER — PATIENT MESSAGE (OUTPATIENT)
Dept: RHEUMATOLOGY | Facility: CLINIC | Age: 59
End: 2024-11-13
Payer: COMMERCIAL

## 2024-11-13 DIAGNOSIS — R82.71 BACTERIURIA: ICD-10-CM

## 2024-11-13 DIAGNOSIS — N39.0 FREQUENT UTI: ICD-10-CM

## 2024-11-13 DIAGNOSIS — R30.0 DYSURIA: Primary | ICD-10-CM

## 2024-11-13 RX ORDER — GRANULES FOR ORAL 3 G/1
3 POWDER ORAL ONCE
Qty: 3 G | Refills: 3 | Status: SHIPPED | OUTPATIENT
Start: 2024-11-13 | End: 2024-11-13

## 2024-11-13 NOTE — TELEPHONE ENCOUNTER
Left message on voicemail for return phone call. We need to know what she needs on referral so I can route the request to Dr. Truong.

## 2024-11-13 NOTE — TELEPHONE ENCOUNTER
----- Message from Belen sent at 11/13/2024  2:38 PM CST -----  Regarding: Request for referral  Name of Who is Calling:[place]            What is the request in detail: Pt is requesting a call back to have a referral sent to Dr. Nova Parsons so that she can be seen because they are requesting a referral to schedule to schedule.            Can the clinic reply by [facility]:Yes            What Number to Call Back if not in [facility]:[phone]

## 2024-11-15 ENCOUNTER — OFFICE VISIT (OUTPATIENT)
Dept: UROLOGY | Facility: CLINIC | Age: 59
End: 2024-11-15
Payer: COMMERCIAL

## 2024-11-15 ENCOUNTER — PATIENT MESSAGE (OUTPATIENT)
Dept: UROLOGY | Facility: CLINIC | Age: 59
End: 2024-11-15

## 2024-11-15 VITALS — WEIGHT: 179.88 LBS | BODY MASS INDEX: 29.97 KG/M2 | HEIGHT: 65 IN

## 2024-11-15 DIAGNOSIS — N39.0 FREQUENT UTI: ICD-10-CM

## 2024-11-15 DIAGNOSIS — R30.0 DYSURIA: Primary | ICD-10-CM

## 2024-11-15 DIAGNOSIS — R82.71 BACTERIURIA: ICD-10-CM

## 2024-11-15 LAB
BILIRUBIN, UA POC OHS: NEGATIVE
BLOOD, UA POC OHS: ABNORMAL
CLARITY, UA POC OHS: CLEAR
COLOR, UA POC OHS: YELLOW
GLUCOSE, UA POC OHS: NEGATIVE
KETONES, UA POC OHS: NEGATIVE
LEUKOCYTES, UA POC OHS: ABNORMAL
MICROSCOPIC COMMENT: NORMAL
NITRITE, UA POC OHS: NEGATIVE
PH, UA POC OHS: 5.5
PROTEIN, UA POC OHS: NEGATIVE
RBC #/AREA URNS AUTO: 0 /HPF (ref 0–4)
SPECIFIC GRAVITY, UA POC OHS: 1.02
SQUAMOUS #/AREA URNS AUTO: 1 /HPF
UROBILINOGEN, UA POC OHS: 0.2

## 2024-11-15 PROCEDURE — 87563 M. GENITALIUM AMP PROBE: CPT | Performed by: NURSE PRACTITIONER

## 2024-11-15 PROCEDURE — 99999 PR PBB SHADOW E&M-EST. PATIENT-LVL V: CPT | Mod: PBBFAC,,, | Performed by: NURSE PRACTITIONER

## 2024-11-15 PROCEDURE — 87086 URINE CULTURE/COLONY COUNT: CPT | Performed by: NURSE PRACTITIONER

## 2024-11-15 PROCEDURE — 81001 URINALYSIS AUTO W/SCOPE: CPT | Performed by: NURSE PRACTITIONER

## 2024-11-15 NOTE — PATIENT INSTRUCTIONS
DETAILED PLAN: .   Preventative DAILY supplements:  Recommend Buying a Cranberry Supplement that has 36mg PAC (only a few have this much) of cranberry - it is a very specific dose but many companies sell it.   Preferred: Utiva Cranberry Tablets (Utiva Cranberry PACs Supplement Pills $39.99 for 30 pills)- their particular tablet is the equivalent of 9 cranberry tablets that you buy over the counter. (phone 1-550.850.1793 or online https://www.[employer]riaplex/products/utiva-uti-control)  Uriexo Cranberry Tablets   [employer] $30/month: https://GKN - GloboKasNet/products/cranberex-urinary-health-support  If unable to afford- can use over the counter cranberry caplets but will need to take 1500mg daily (about 3 capsules, 3x a day)   Ok to Buy Over the Counter at [employer], [employer] (ask pharmacist for help) or buy from [facility] (see above)  Probiotic with lactobacillus - take once a day. This helps populate the vagina with good bacteria instead of bad bacteria- you can buy this over the counter or buy from the company [facility]. Make sure to look for LACTOBACILLUS on the bottle.   D-mannose supplement (2000mg a day)  $20/30d supply  This helps keep the bad bacteria from sticking to your bladder wall. You can buy this over the counter or buy from [facility] by visiting [employer].     UTI prevention recommendations  Drink more water (2 to 4 bottles) to dilute the bacteria that are replicating. This will also help you urinate more often if you tend to hold your bladder.   Timed voiding (which means- Urinate every 2 hours during the day) to rid the bladder of bacteria before they multiply and start to stick to your bladder walls and cause more symptoms and problems  Avoid pads if possible or wear thinner pads and change them more often    Other helpful information:    If you have a UTI try to self treat first for 1-2 days with conservative treatment:  Increase fluid intake - drink 4 to 5 bottles of  "water a day and empty bladder often  AZO for burning or urinary frequency (over the counter)  Utiva cranberry tablets when having uti symptoms: Take 2x a day for 2 days then daily for a week (buy from [employer]). Visit https://www.Eptica.Hall or call 1-764.721.2912 to order. They costs about $30/month and offer a subscribe and save option. They also have a probiotic and D mannose supplementation, if the patient is able to afford, I suggest taking. Utiva cranberry tablets only available from [employer] are equivalent to the suggested dose of 9 tablets if you buy over the counter.   D mannose 2000mx a day for 2 days then daily for a week (can buy from [employer] or over the counter)    If your symptoms persists despite increased water intake and azo then:  see pcp, gynecologist, or urgent care and make sure to give a clean catch urine or catheterized urine. This means wipe, urinate in the toilet, then provide a MID STREAM sample (your hand should get urine on it if you are doing it right). This avoids urine picking up the normal bacteria that line the vagina on the way out to the cup.   ask for a URINE CULTURE. You need to make sure you know what antibiotics will kill your particular bacteria  if you are told you have "multiple organisms" or "normal vaginal rufus" it means the urine sample picked up the normal bacteria in the vagina and contaminated your urine specimen. If you are still having symptoms of a UTI then you will need to provide ANOTHER clean catch sample or CATHETERIZED urine to bypass the vagina and get urine directly from the bladder:     If you are given antibiotics from primary care, ER, urgent care or gynecologist:  only take 3 to 5 days of the antibiotics (unless you have diabetes or a urologist tells you take take more), even if they give you a 10d supply and keep or discard the rest  only take the full 10 days if you are having fever, chills or pain in your kidneys     Things to remember: " "  Try to avoid antibiotics or at least try to avoid taking them for more than 3 to 5 days for simple uti.    Bacteria are smart and they will become resistant to antibiotics. We have only a limited amount of antibiotics that work.   ALWAYS request a urine culture so you can know which antibiotics work.   If you ever see bloody red urine call us ASAP, even with uti's and even if you are on a blood thinner, this can sometimes be a sign of bladder cancer or kidney stones.     If you do have uti symptoms but do not have a culture proven uti (with E.coli, for example)  You may need a catheterized urine if it says "multiple organisms" which means normal vaginal rufus  You may have a kidney stone, interstitial cystitis, overactive bladder, bladder cancer, so please call to make a follow-up     Once we rule out any anatomic obstruction and have given UTI recs, we will see for follow-up and then will have you return to your PCP/primary care physician  for symptomatic UTI treatment wit the information and recommendations we have given them.     "

## 2024-11-15 NOTE — PROGRESS NOTES
"[facility] Note  Staff: GREYSON Majano    PCP: SAMANTHA Truong    Chief Complaint: Vaginal discharge, Recurrent UTIs    Subjective:        HPI: Crystal Hein is a 59 y.o. female presents today with "ongoing UTI symptoms"    11/15/24:  Pt arrives today to clarify that she requested Dr. Nova Parsons however was instructed to see me "first" before being evaluated by Dr. Parsons.  CC UA in office showed Small leuks, trace-intact blood, 5.5, 1.020.  +Stress Urinary Incontinence for several years.  +Pt has had intermittent vaginal leakage for 2 years with no odor.  Dysuria:  Yes  Gross HematuriaNo  History of UTI: Yes - Pt brings in hx of Urinalysis testing, NO URINE CULTURES to observe at this time.  History of Kidney Stones?:  None  Constipation issues?:  None    12/8/23:  CT Abdomen Pelvis With IV Contrast:  IMPRESSION:  No acute findings noted at this time.    REVIEW OF SYSTEMS:  A comprehensive 10 system review was performed and is negative except as noted above in HPI    PMHx:  Past Medical History:   Diagnosis Date    Anemia     Celiac disease/sprue     Frequent headaches     Immunodeficiency     Osteoarthritis     Pneumonia     Sjogren's disease      PSHx:  Past Surgical History:   Procedure Laterality Date    ADENOIDECTOMY      AUGMENTATION OF BREAST      BREAST BIOPSY      HYSTERECTOMY      knee  for ACL      OOPHORECTOMY      TX EXPLORATORY OF ABDOMEN      TONSILLECTOMY      tummy tuck         Allergies:  Dexamethasone, Imitrex [sumatriptan succinate], Azulfidine [sulfasalazine], Flagyl [metronidazole hcl], and Pyridium [phenazopyridine]    Medications: reviewed   Objective:   There were no vitals filed for this visit.    Physical Exam  Vitals reviewed.   Constitutional:       Appearance: She is well-developed.   HENT:      Head: Normocephalic and atraumatic.   Eyes:      Conjunctiva/sclera: Conjunctivae normal.      Pupils: Pupils are equal, round, and reactive to " light.   Cardiovascular:      Rate and Rhythm: Normal rate and regular rhythm.      Heart sounds: Normal heart sounds.   Pulmonary:      Effort: Pulmonary effort is normal.      Breath sounds: Normal breath sounds.   Abdominal:      General: Bowel sounds are normal.      Palpations: Abdomen is soft.   Musculoskeletal:         General: Normal range of motion.      Cervical back: Normal range of motion and neck supple.   Skin:     General: Skin is warm and dry.   Neurological:      Mental Status: She is alert and oriented to person, place, and time.      Deep Tendon Reflexes: Reflexes are normal and symmetric.   Psychiatric:         Behavior: Behavior normal.         Thought Content: Thought content normal.         Judgment: Judgment normal.      EXAM performed by me in office today:  External Genitalia: normal hair distribution, no lesions  Urethral meatus: normal without prolapse or caruncle  Urethra: without tenderness or mass  Bladder: without fulness or tenderness  No vaginal discharge observed.  Cough test negative during exam today.  Mild POP observed  10 Fr straight in and out cath performed by me with 65 mL of urine residual    Assessment:       1. Dysuria    2. Frequent UTI    3. Bacteriuria          Plan:      UA Micro and Urine Culture from cath'd specimen to be processed today to rule out infection at this time.    Pt was encouraged to begin taking daily supplementation of the following per instructions given:  D-Mannose daily, Cranberry tablets, etc.    I have spent over 30 min with this patient regarding discussion of the following:    -Discussed conservative measures to control urgency and frequency including   1. Avoiding/minimizing bladder irritants (see below), especially in afternoon and evening hours     Discussed bladder irritants include coffee (even decaf), tea, alcohol, soda, spicy foods, acidic juices (orange, tomato), vinegar, and artificial sweeteners/sugary beverages.     2. Do Timed  Voiding - empty on a schedule (approx ~2-3 hours) in spite of need to urinate, to get ahead of urge     3. Do not postponing voiding - dont hold it on purpose      4. Bowel regimen as distended bowel has extrinsic compressive effect on bladder.   - any or all of the following in any combination, titrate to soft daily bowel movement without pushing or straining  - colace/stool softener capsule - once to twice daily  - miralax - 1 capful daily to start, can increase to 2x daily (or decrease to 1/2 cap daily)  - increase dietary fibery  - fiber supplements, such as metamucil  - prunes, prune juice     5. INCREASE water intake     6. Stop fluids 2 hours before bed, and urinate just before bed      F/u with Dr. Nova Parsons per pt's request for further assessment and evaluation in the near future.    [facility]: Active    Yasmeen Moore, FNP-C

## 2024-11-16 LAB
ALBUMIN SERPL-MCNC: 4.3 G/DL (ref 3.6–5.1)
ALBUMIN/GLOB SERPL: 1.9 (CALC) (ref 1–2.5)
ALP SERPL-CCNC: 60 U/L (ref 37–153)
ALT SERPL-CCNC: 15 U/L (ref 6–29)
ANA PAT SER IF-IMP: ABNORMAL
ANA SER QL IF: POSITIVE
ANA TITR SER IF: ABNORMAL TITER
APPEARANCE UR: ABNORMAL
AST SERPL-CCNC: 16 U/L (ref 10–35)
BACTERIA #/AREA URNS HPF: ABNORMAL /HPF
BACTERIA UR CULT: NO GROWTH
BASOPHILS # BLD AUTO: 49 CELLS/UL (ref 0–200)
BASOPHILS NFR BLD AUTO: 0.6 %
BILIRUB SERPL-MCNC: 0.6 MG/DL (ref 0.2–1.2)
BILIRUB UR QL STRIP: NEGATIVE
BUN SERPL-MCNC: 14 MG/DL (ref 7–25)
BUN/CREAT SERPL: ABNORMAL (CALC) (ref 6–22)
CALCIUM SERPL-MCNC: 9.1 MG/DL (ref 8.6–10.4)
CD19 CELLS # BLD: 307 CELLS/UL (ref 110–660)
CD19 CELLS NFR BLD: 11 % (ref 6–29)
CD3 CELLS # BLD: 2245 CELLS/UL (ref 840–3060)
CD3 CELLS NFR BLD: 83 % (ref 57–85)
CD3+CD4+ CELLS # BLD: 1075 CELLS/UL (ref 490–1740)
CD3+CD4+ CELLS NFR BLD: 41 % (ref 30–61)
CD3+CD4+ CELLS/CD3+CD8+ CLL BLD: 0.96 % (ref 0.86–5)
CD3+CD8+ CELLS # BLD: 1121 CELLS/UL (ref 180–1170)
CD3+CD8+ CELLS NFR BLD: 43 % (ref 12–42)
CHLORIDE SERPL-SCNC: 103 MMOL/L (ref 98–110)
CO2 SERPL-SCNC: 28 MMOL/L (ref 20–32)
COLOR UR: ABNORMAL
CREAT SERPL-MCNC: 0.77 MG/DL (ref 0.5–1.03)
CRP SERPL-MCNC: 11.9 MG/L
EGFR: 89 ML/MIN/1.73M2
ENA SM AB SER IA-ACNC: NORMAL AI
ENA SM+RNP AB SER IA-ACNC: NORMAL AI
ENA SS-A AB SER IA-ACNC: NORMAL AI
ENA SS-B AB SER IA-ACNC: ABNORMAL AI
EOSINOPHIL # BLD AUTO: 227 CELLS/UL (ref 15–500)
EOSINOPHIL NFR BLD AUTO: 2.8 %
ERYTHROCYTE [DISTWIDTH] IN BLOOD BY AUTOMATED COUNT: 12.1 % (ref 11–15)
ERYTHROCYTE [SEDIMENTATION RATE] IN BLOOD BY WESTERGREN METHOD: 6 MM/H
GLOBULIN SER CALC-MCNC: 2.3 G/DL (CALC) (ref 1.9–3.7)
GLUCOSE SERPL-MCNC: 106 MG/DL (ref 65–99)
GLUCOSE UR QL STRIP: NEGATIVE
HCT VFR BLD AUTO: 36.2 % (ref 35–45)
HGB BLD-MCNC: 12 G/DL (ref 11.7–15.5)
HGB UR QL STRIP: ABNORMAL
HYALINE CASTS #/AREA URNS LPF: ABNORMAL /LPF
IGA SERPL-MCNC: 250 MG/DL (ref 47–310)
IGG SERPL-MCNC: 684 MG/DL (ref 600–1640)
IGG1 SER-MCNC: 392 MG/DL (ref 382–929)
IGG2 SER-MCNC: 238 MG/DL (ref 241–700)
IGG3 SER-MCNC: 83 MG/DL (ref 22–178)
IGG4 SER-MCNC: 10.7 MG/DL (ref 4–86)
IGM SERPL-MCNC: 48 MG/DL (ref 50–300)
KETONES UR QL STRIP: NEGATIVE
LEUKOCYTE ESTERASE UR QL STRIP: ABNORMAL
LYMPHOCYTES # BLD AUTO: 2349 CELLS/UL (ref 850–3900)
LYMPHOCYTES # BLD AUTO: 2694 CELLS/UL (ref 850–3900)
LYMPHOCYTES NFR BLD AUTO: 29 %
M GENITALIUM DNA UR QL NAA+PROBE: NEGATIVE
MCH RBC QN AUTO: 31.5 PG (ref 27–33)
MCHC RBC AUTO-ENTMCNC: 33.1 G/DL (ref 32–36)
MCV RBC AUTO: 95 FL (ref 80–100)
MONOCYTES # BLD AUTO: 608 CELLS/UL (ref 200–950)
MONOCYTES NFR BLD AUTO: 7.5 %
NEUTROPHILS # BLD AUTO: 4868 CELLS/UL (ref 1500–7800)
NEUTROPHILS NFR BLD AUTO: 60.1 %
NITRITE UR QL STRIP: NEGATIVE
PH UR STRIP: 5.5 [PH] (ref 5–8)
PLATELET # BLD AUTO: 260 THOUSAND/UL (ref 140–400)
PMV BLD REES-ECKER: 10.3 FL (ref 7.5–12.5)
POTASSIUM SERPL-SCNC: 4.1 MMOL/L (ref 3.5–5.3)
PROT SERPL-MCNC: 6.6 G/DL (ref 6.1–8.1)
PROT UR QL STRIP: ABNORMAL
RBC # BLD AUTO: 3.81 MILLION/UL (ref 3.8–5.1)
RBC #/AREA URNS HPF: ABNORMAL /HPF
S PN DA SERO 19F IGG SER-MCNC: 6.7 UG/ML
S PNEUM DA 1 IGG SER-MCNC: 3.1 UG/ML
S PNEUM DA 12F IGG SER-MCNC: 0.4 UG/ML
S PNEUM DA 14 IGG SER-MCNC: 0.8
S PNEUM DA 18C IGG SER-MCNC: 2.1
S PNEUM DA 23F IGG SER-MCNC: 1 UG/ML
S PNEUM DA 3 IGG SER-MCNC: <0.3 UG/ML
S PNEUM DA 4 IGG SER-MCNC: <0.3 UG/ML
S PNEUM DA 5 IGG SER-MCNC: 2.4 UG/ML
S PNEUM DA 6B IGG SER-MCNC: 0.6 UG/ML
S PNEUM DA 7F IGG SER-MCNC: 0.5 UG/ML
S PNEUM DA 8 IGG SER-MCNC: 0.7 UG/ML
S PNEUM DA 9N IGG SER-MCNC: <0.3 UG/ML
S PNEUM DA 9V IGG SER-MCNC: 1.3 UG/ML
SERVICE CMNT-IMP: ABNORMAL
SODIUM SERPL-SCNC: 139 MMOL/L (ref 135–146)
SP GR UR STRIP: 1.03 (ref 1–1.03)
SPECIMEN SOURCE: NORMAL
SQUAMOUS #/AREA URNS HPF: ABNORMAL /HPF
WBC # BLD AUTO: 8.1 THOUSAND/UL (ref 3.8–10.8)
WBC #/AREA URNS HPF: ABNORMAL /HPF

## 2024-11-22 ENCOUNTER — PATIENT MESSAGE (OUTPATIENT)
Dept: PODIATRY | Facility: CLINIC | Age: 59
End: 2024-11-22
Payer: COMMERCIAL

## 2024-12-30 ENCOUNTER — TELEPHONE (OUTPATIENT)
Dept: UROLOGY | Facility: CLINIC | Age: 59
End: 2024-12-30
Payer: COMMERCIAL

## 2024-12-30 NOTE — TELEPHONE ENCOUNTER
----- Message from Maggi sent at 12/30/2024 11:38 AM CST -----  Regarding: call back  Contact: pt  Type: Needs Medical Advice  Who Called:  patient   Symptoms (please be specific):    How long has patient had these symptoms:    Pharmacy name and phone #:    Best Call Back Number: [phone]    Additional Information: leelee latham do u have a sooner apt than april or tomorrow for pt to be seen [facility] [id number] CYNTHIA MONZON

## 2024-12-30 NOTE — TELEPHONE ENCOUNTER
Spoke with patient was scheduled on 1/6 with  will be out of town. Next available is April. Patient informed will place on waitlist for sooner appointment. Verbally voiced understanding.

## 2025-01-03 DIAGNOSIS — D83.9 COMMON VARIABLE IMMUNODEFICIENCY, UNSPECIFIED: ICD-10-CM

## 2025-01-03 DIAGNOSIS — M35.00 SJOGREN SYNDROME, UNSPECIFIED: ICD-10-CM

## 2025-01-03 DIAGNOSIS — D83.9 CVID (COMMON VARIABLE IMMUNODEFICIENCY): ICD-10-CM

## 2025-01-03 DIAGNOSIS — M35.00 SJOGREN'S SYNDROME, WITH UNSPECIFIED ORGAN INVOLVEMENT: ICD-10-CM

## 2025-01-03 DIAGNOSIS — K21.00 GASTROESOPHAGEAL REFLUX DISEASE WITH ESOPHAGITIS WITHOUT HEMORRHAGE: ICD-10-CM

## 2025-01-07 RX ORDER — ERGOCALCIFEROL 1.25 MG/1
50000 CAPSULE ORAL
Qty: 4 CAPSULE | Refills: 0 | Status: SHIPPED | OUTPATIENT
Start: 2025-01-07

## 2025-01-07 RX ORDER — HYDROXYCHLOROQUINE SULFATE 200 MG/1
200 TABLET, FILM COATED ORAL 2 TIMES DAILY
Qty: 180 TABLET | Refills: 0 | Status: SHIPPED | OUTPATIENT
Start: 2025-01-07

## 2025-02-05 ENCOUNTER — TELEPHONE (OUTPATIENT)
Dept: RHEUMATOLOGY | Facility: CLINIC | Age: 60
End: 2025-02-05
Payer: COMMERCIAL

## 2025-02-17 ENCOUNTER — OFFICE VISIT (OUTPATIENT)
Dept: RHEUMATOLOGY | Facility: CLINIC | Age: 60
End: 2025-02-17
Payer: COMMERCIAL

## 2025-02-17 VITALS
HEART RATE: 78 BPM | HEIGHT: 65 IN | SYSTOLIC BLOOD PRESSURE: 157 MMHG | WEIGHT: 179.88 LBS | BODY MASS INDEX: 29.97 KG/M2 | DIASTOLIC BLOOD PRESSURE: 82 MMHG

## 2025-02-17 DIAGNOSIS — M35.00 SJOGREN SYNDROME, UNSPECIFIED: ICD-10-CM

## 2025-02-17 DIAGNOSIS — D83.9 CVID (COMMON VARIABLE IMMUNODEFICIENCY): ICD-10-CM

## 2025-02-17 DIAGNOSIS — J32.9 SINUS ABSCESS: ICD-10-CM

## 2025-02-17 DIAGNOSIS — J32.0 CHRONIC MAXILLARY SINUSITIS: ICD-10-CM

## 2025-02-17 DIAGNOSIS — N39.0 FREQUENT UTI: ICD-10-CM

## 2025-02-17 DIAGNOSIS — M35.09 SJOGREN'S SYNDROME WITH OTHER ORGAN INVOLVEMENT: ICD-10-CM

## 2025-02-17 DIAGNOSIS — D83.9 COMMON VARIABLE IMMUNODEFICIENCY, UNSPECIFIED: ICD-10-CM

## 2025-02-17 DIAGNOSIS — B37.81 THRUSH OF MOUTH AND ESOPHAGUS: Primary | ICD-10-CM

## 2025-02-17 DIAGNOSIS — N89.8 VAGINAL DRYNESS: ICD-10-CM

## 2025-02-17 DIAGNOSIS — N81.10 CYSTOCELE WITHOUT UTERINE PROLAPSE: ICD-10-CM

## 2025-02-17 DIAGNOSIS — M17.0 PRIMARY OSTEOARTHRITIS OF BOTH KNEES: ICD-10-CM

## 2025-02-17 DIAGNOSIS — B37.0 THRUSH OF MOUTH AND ESOPHAGUS: Primary | ICD-10-CM

## 2025-02-17 DIAGNOSIS — B02.8 HERPES ZOSTER WITH COMPLICATION: ICD-10-CM

## 2025-02-17 PROCEDURE — 99215 OFFICE O/P EST HI 40 MIN: CPT | Mod: 25,S$GLB,, | Performed by: INTERNAL MEDICINE

## 2025-02-17 PROCEDURE — 3008F BODY MASS INDEX DOCD: CPT | Mod: CPTII,S$GLB,, | Performed by: INTERNAL MEDICINE

## 2025-02-17 PROCEDURE — 99999 PR PBB SHADOW E&M-EST. PATIENT-LVL V: CPT | Mod: PBBFAC,,, | Performed by: INTERNAL MEDICINE

## 2025-02-17 PROCEDURE — 96372 THER/PROPH/DIAG INJ SC/IM: CPT | Mod: S$GLB,,, | Performed by: INTERNAL MEDICINE

## 2025-02-17 PROCEDURE — 3079F DIAST BP 80-89 MM HG: CPT | Mod: CPTII,S$GLB,, | Performed by: INTERNAL MEDICINE

## 2025-02-17 PROCEDURE — 1159F MED LIST DOCD IN RCRD: CPT | Mod: CPTII,S$GLB,, | Performed by: INTERNAL MEDICINE

## 2025-02-17 PROCEDURE — 3077F SYST BP >= 140 MM HG: CPT | Mod: CPTII,S$GLB,, | Performed by: INTERNAL MEDICINE

## 2025-02-17 RX ORDER — IBUPROFEN AND FAMOTIDINE 26.6; 8 MG/1; MG/1
1 TABLET ORAL 3 TIMES DAILY
Qty: 90 TABLET | Refills: 12 | Status: SHIPPED | OUTPATIENT
Start: 2025-02-17 | End: 2026-02-17

## 2025-02-17 RX ORDER — NYSTATIN 100000 [USP'U]/ML
6 SUSPENSION ORAL 4 TIMES DAILY
Qty: 240 ML | Refills: 1 | Status: SHIPPED | OUTPATIENT
Start: 2025-02-17 | End: 2025-03-09

## 2025-02-17 RX ORDER — PREDNISONE 1 MG/1
4 TABLET ORAL DAILY PRN
Qty: 120 TABLET | Refills: 1 | Status: SHIPPED | OUTPATIENT
Start: 2025-02-17

## 2025-02-17 RX ORDER — FLUCONAZOLE 200 MG/1
200 TABLET ORAL DAILY
Qty: 14 TABLET | Refills: 1 | Status: SHIPPED | OUTPATIENT
Start: 2025-02-17 | End: 2025-03-03

## 2025-02-17 RX ORDER — HYDROXYCHLOROQUINE SULFATE 200 MG/1
200 TABLET, FILM COATED ORAL 2 TIMES DAILY
Qty: 180 TABLET | Refills: 1 | Status: SHIPPED | OUTPATIENT
Start: 2025-02-17

## 2025-02-17 RX ORDER — DOXYCYCLINE HYCLATE 100 MG
100 TABLET ORAL 2 TIMES DAILY
Qty: 28 TABLET | Refills: 0 | Status: SHIPPED | OUTPATIENT
Start: 2025-02-17 | End: 2025-03-03

## 2025-02-17 RX ORDER — KETOROLAC TROMETHAMINE 30 MG/ML
60 INJECTION, SOLUTION INTRAMUSCULAR; INTRAVENOUS
Status: COMPLETED | OUTPATIENT
Start: 2025-02-17 | End: 2025-02-17

## 2025-02-17 RX ORDER — METHYLPREDNISOLONE ACETATE 80 MG/ML
80 INJECTION, SUSPENSION INTRA-ARTICULAR; INTRALESIONAL; INTRAMUSCULAR; SOFT TISSUE
Status: COMPLETED | OUTPATIENT
Start: 2025-02-17 | End: 2025-02-17

## 2025-02-17 RX ORDER — CEVIMELINE HYDROCHLORIDE 30 MG/1
30 CAPSULE ORAL 3 TIMES DAILY
Qty: 90 CAPSULE | Refills: 11 | Status: SHIPPED | OUTPATIENT
Start: 2025-02-17 | End: 2026-02-17

## 2025-02-17 RX ORDER — CEFTRIAXONE 1 G/1
1 INJECTION, POWDER, FOR SOLUTION INTRAMUSCULAR; INTRAVENOUS
Status: COMPLETED | OUTPATIENT
Start: 2025-02-17 | End: 2025-02-17

## 2025-02-17 RX ADMIN — KETOROLAC TROMETHAMINE 60 MG: 30 INJECTION, SOLUTION INTRAMUSCULAR; INTRAVENOUS at 10:02

## 2025-02-17 RX ADMIN — METHYLPREDNISOLONE ACETATE 80 MG: 80 INJECTION, SUSPENSION INTRA-ARTICULAR; INTRALESIONAL; INTRAMUSCULAR; SOFT TISSUE at 10:02

## 2025-02-17 RX ADMIN — CEFTRIAXONE 1 G: 1 INJECTION, POWDER, FOR SOLUTION INTRAMUSCULAR; INTRAVENOUS at 10:02

## 2025-02-17 ASSESSMENT — ROUTINE ASSESSMENT OF PATIENT INDEX DATA (RAPID3)
PATIENT GLOBAL ASSESSMENT SCORE: 8
PAIN SCORE: 8
MDHAQ FUNCTION SCORE: 1
TOTAL RAPID3 SCORE: 6.44
PSYCHOLOGICAL DISTRESS SCORE: 2.2

## 2025-02-17 NOTE — LETTER
February 17, 2025        No Recipients             [facility]  [address]  [place] [address]  Phone: [phone]  Fax: [phone]   Patient: Crystal Hein   MR Number: [id number]   YOB: 1965   Date of Visit: 2/17/2025     Dear  No Recipients,     {WILDCARD:[id number]}    Sincerely,      Ghassan Truong MD            CC  No Recipients    Enclosure

## 2025-02-17 NOTE — PROGRESS NOTES
Subjective:     Patient ID:  Crystal Hein    Chief Complaint:  Disease Management     History of Present Illness:  Pt is a 59 y.o. female  she has  ra, sleand sjogren's on plaquenil,she has severe thrush, severe constipation, h/o C'difficle and pancreatitis. She was severe vertigo with she tried the hormone pellets she had a rash stopped pellets.Pain is located in multiple joints, both shoulder(s), both elbow(s), both wrist(s), both MCP(s): 1st, 2nd, 3rd, 4th and 5th, both PIP(s): 1st, 2nd, 3rd, 4th and 5th, both DIP(s): 1st and 2nd, both hip(s), both knee(s) and both MTP(s): 1st, 2nd, 3rd, 4th and 5th, is described as aching, pulsating, shooting and throbbing, and is constant, moderate . Severe neck pain she has a mass over her C spine, decrease rom upper movement and stays inflammed.   Rheumatologic History:   - Diagnosis/es:  - Positive serologies:  - Infectious screening labs:  - Previous Treatments:  - Current Treatments:     Interval History:   Hospitalization since last office visit: no    Patient Active Problem List    Diagnosis Date Noted    Systemic lupus erythematosus 02/14/2024    Sjogren's syndrome 04/20/2018    Chest pain radiating to arm 07/10/2015    Immunodeficiency, common variable 07/10/2015    Neck pain, bilateral 07/10/2015    Sjogren's disease 11/07/2014    Reactive arthritis 11/07/2014    Osteoarthritis of back 11/07/2014     Past Surgical History:   Procedure Laterality Date    ADENOIDECTOMY      AUGMENTATION OF BREAST      BREAST BIOPSY      HYSTERECTOMY      knee  for ACL      OOPHORECTOMY      SC EXPLORATORY OF ABDOMEN      TONSILLECTOMY      tummy tuck       Social History[1]  No family history on file.  Review of patient's allergies indicates:   Allergen Reactions    Dexamethasone Other (See Comments)     Paranoia and anxiety    Imitrex [sumatriptan succinate] Anaphylaxis    Azulfidine [sulfasalazine]     Flagyl [metronidazole hcl] Dermatitis    Pyridium [phenazopyridine]         Review of Systems   Review of Systems   Constitutional:  Positive for chills and fatigue. Negative for activity change, appetite change, diaphoresis, fever and unexpected weight change.   HENT:  Negative for congestion, dental problem, ear discharge, ear pain, facial swelling, mouth sores, nosebleeds, postnasal drip, rhinorrhea, sinus pressure, sneezing, sore throat, tinnitus, trouble swallowing and voice change.    Eyes:  Negative for photophobia, pain, discharge, redness and itching.   Respiratory:  Positive for cough. Negative for apnea, chest tightness, shortness of breath and wheezing.    Cardiovascular:  Positive for leg swelling. Negative for chest pain and palpitations.   Gastrointestinal:  Positive for abdominal pain. Negative for abdominal distention, constipation, diarrhea, nausea and vomiting.   Endocrine: Negative for cold intolerance, heat intolerance, polydipsia and polyuria.   Genitourinary:  Positive for urgency. Negative for decreased urine volume, difficulty urinating, dysuria, flank pain, frequency and hematuria.   Musculoskeletal:  Positive for arthralgias, back pain, gait problem, joint swelling, myalgias, neck pain and neck stiffness.   Skin:  Negative for pallor, rash and wound.   Allergic/Immunologic: Negative for immunocompromised state.   Neurological:  Negative for dizziness, tremors, numbness and headaches.   Hematological:  Negative for adenopathy. Does not bruise/bleed easily.   Psychiatric/Behavioral:  Negative for sleep disturbance. The patient is not nervous/anxious.         Current Medications:  Current Outpatient Medications   Medication Instructions    acyclovir (ZOVIRAX) 800 mg, 5 times daily    azelastine (ASTELIN) 137 mcg (0.1 %) nasal spray use 1-2 sprays IN EACH NOSTRIL TWICE DAILY FOR allergies    bisacodyL (DULCOLAX) 5 mg EC tablet TAKE TWO TABLETS BY MOUTH as directed for ONE day    boric acid (PH-D) 600 mg, Vaginal, Nightly    cevimeline (EVOXAC) 30 mg, Oral, 3  "times daily    dicyclomine (BENTYL) 20 mg tablet TAKE ONE TABLET BY MOUTH EVERY 6 HOURS    doxycycline (VIBRA-TABS) 100 mg, Oral, 2 times daily    ergocalciferol (ERGOCALCIFEROL) 50,000 Units, Oral, Every 7 days    fluconazole (DIFLUCAN) 200 mg, Oral, Daily    fluticasone propionate (FLONASE) 50 mcg/actuation nasal spray 2 sprays, Daily PRN    furosemide (LASIX) 10-20 mg, Daily PRN    hydroxychloroquine (PLAQUENIL) 200 mg, Oral, 2 times daily    ibuprofen-famotidine (DUEXIS) 800-26.6 mg Tab 1 tablet, Oral, 3 times daily    ketorolac (TORADOL) 10 mg tablet TAKE ONE TABLET BY MOUTH EVERY 6 HOURS FOR 5 DAYS    lansoprazole (PREVACID) 30 MG capsule TAKE ONE CAPSULE BY MOUTH ONCE DAILY    levocetirizine (XYZAL) 5 mg, Oral, Nightly    meclizine (ANTIVERT) 25 mg tablet TAKE ONE TABLET BY MOUTH THREE TIMES DAILY AS NEEDED    montelukast (SINGULAIR) 10 mg tablet TAKE ONE TABLET BY MOUTH EVERY EVENING    nystatin (MYCOSTATIN) 100,000 unit/mL suspension 5 mLs, 4 times daily    nystatin (MYCOSTATIN) 600,000 Units, Oral, 4 times daily    ondansetron (ZOFRAN-ODT) 4 mg, Oral, Every 6 hours PRN    ospemifene (OSPHENA) 60 mg, Oral, Daily    pantoprazole (PROTONIX) 40 MG tablet TAKE ONE TABLET BY MOUTH once daily    predniSONE (DELTASONE) 4 mg, Oral, Daily PRN    promethazine (PHENERGAN) 25 mg, Oral, Every 4 hours    spironolactone (ALDACTONE) 50 MG tablet Take by mouth.    sucralfate (CARAFATE) 1 g, Oral, 4 times daily    triamcinolone acetonide 0.1% (KENALOG) 0.1 % ointment Apply topically.    valACYclovir (VALTREX) 1,000 mg, Oral, 2 times daily         Objective:     Vitals:    02/17/25 0910   BP: (!) 157/82   Pulse: 78   Weight: 81.6 kg (179 lb 14.3 oz)   Height: 5' 5" (1.651 m)   PainSc:   7      Body mass index is 29.94 kg/m².     Physical Examinations:  Physical Exam   Constitutional: She is oriented to person, place, and time. No distress.   HENT:   Head: Normocephalic and atraumatic.   Eyes: Pupils are equal, round, and " reactive to light. Right eye exhibits no discharge. Left eye exhibits no discharge.   Neck: No thyromegaly present.   Cardiovascular: Normal rate, regular rhythm and normal heart sounds. Exam reveals no gallop and no friction rub.   No murmur heard.  Pulmonary/Chest: Breath sounds normal. She has no wheezes. She has no rales. She exhibits no tenderness.   Abdominal: There is no abdominal tenderness. There is no rebound and no guarding.   Musculoskeletal:         General: Tenderness and deformity present.      Right shoulder: Tenderness present.      Left shoulder: Tenderness present.      Right elbow: Normal.      Left elbow: Tenderness present.      Right wrist: Tenderness present.      Left wrist: Tenderness present.      Cervical back: Neck supple.      Right knee: Effusion present. Tenderness present.      Left knee: Effusion present. Tenderness present.   Lymphadenopathy:     She has no cervical adenopathy.   Neurological: She is alert and oriented to person, place, and time. Gait normal.   Skin: Skin is dry. No rash noted. No erythema. There is pallor.   Psychiatric: Mood and affect normal.   Vitals reviewed.      Right Side Rheumatological Exam     Examination finds the elbow normal.    The patient is tender to palpation of the shoulder, wrist, knee, 1st PIP, 1st MCP, 2nd PIP, 2nd MCP, 3rd PIP, 3rd MCP, 4th PIP, 4th MCP, 5th PIP and 5th MCP    She has swelling of the 1st PIP, 1st MCP, 2nd PIP, 2nd MCP, 3rd PIP, 3rd MCP, 4th PIP, 4th MCP, 5th PIP and 5th MCP    Shoulder Exam   Tenderness Location: no tenderness    Range of Motion   Active abduction:  abnormal   Adduction: abnormal  Sensation: normal    Knee Exam   Patellofemoral Crepitus: positive  Effusion: positive  Sensation: normal    Hip Exam   Tenderness Location: posterior  Sensation: normal    Elbow/Wrist Exam   Tenderness Location: no tenderness  Sensation: normal    Left Side Rheumatological Exam     The patient is tender to palpation of the  shoulder, elbow, wrist, knee, 1st PIP, 1st MCP, 2nd PIP, 2nd MCP, 3rd PIP, 3rd MCP, 4th PIP, 4th MCP, 5th PIP and 5th MCP.    She has swelling of the 1st PIP, 1st MCP, 2nd PIP, 2nd MCP, 3rd PIP, 3rd MCP, 4th PIP, 4th MCP, 5th PIP and 5th MCP    Shoulder Exam   Tenderness Location: no tenderness    Range of Motion   Active abduction:  abnormal   Sensation: normal    Knee Exam     Patellofemoral Crepitus: positive  Effusion: positive  Sensation: normal    Hip Exam   Tenderness Location: posterior  Sensation: normal    Elbow/Wrist Exam   Sensation: normal      Back/Neck Exam   General Inspection   Gait: normal          Disease Assessment Scores:  Patient's Global Assessment of arthritis (0-10): 1  Physician's Global Assessment of arthritis (0-10): 1  Number of Tender Joints (0-28): 1  Number of Swollen Joints (0-28): 2        2/6/2024     9:43 AM   Rapid3 Question Responses and Scores   MDHAQ Score 0.9   Psychologic Score 0   Pain Score 7   When you awakened in the morning OVER THE LAST WEEK, did you feel stiff? Yes   If Yes, please indicate the number of hours until you are as limber as you will be for the day 1   Fatigue Score 5   Global Health Score 5   RAPID3 Score 5       Monitoring Lab Results:  Lab Results   Component Value Date    WBC 8.1 11/11/2024    RBC 3.81 11/11/2024    HGB 12.0 11/11/2024    HCT 36.2 11/11/2024    MCV 95.0 11/11/2024    MCH 31.5 11/11/2024    MCHC 33.1 11/11/2024    RDW 12.1 11/11/2024     11/11/2024        Lab Results   Component Value Date     11/11/2024    K 4.1 11/11/2024     11/11/2024    CO2 28 11/11/2024     (H) 11/11/2024    BUN 14 11/11/2024    CREATININE 0.77 11/11/2024    CALCIUM 9.1 11/11/2024    PROT 6.6 11/11/2024    ALBUMIN 4.3 11/11/2024    BILITOT 0.6 11/11/2024    ALKPHOS 89 12/08/2023    AST 16 11/11/2024    ALT 15 11/11/2024    ANIONGAP 11 12/08/2023    EGFRNORACEVR 89 11/11/2024       Lab Results   Component Value Date    SEDRATE 6 11/11/2024  "   CRP 11.9 (H) 11/11/2024        Lab Results   Component Value Date    LSKJYQSB24 372 11/02/2012        Lab Results   Component Value Date    CHOL 183 07/11/2015    HDL 48 07/11/2015    LDLCALC 114 07/11/2015    TRIG 105 07/11/2015       Lab Results   Component Value Date    RF <14 05/11/2023    CCPANTIBODIE <0.5 11/07/2014     Lab Results   Component Value Date    ANASCREEN Negative <1:160 11/07/2014    DSDNA 3 01/26/2022    SMRNPAB <1.0 NEG 11/11/2024    WDY56TM <1.0 NEG 01/26/2022     No results found for: "HLABB27"    Infectious Disease Screening:  Lab Results   Component Value Date    HEPBSAG NON-REACTIVE 10/25/2022    HEPBCAB NON-REACTIVE 10/25/2022     Lab Results   Component Value Date    HEPCAB NON-REACTIVE 10/25/2022     Lab Results   Component Value Date    QUANTTBGDPL NEGATIVE 10/25/2022     Results for orders placed or performed in visit on 11/15/24   POCT Urinalysis(Instrument)    Collection Time: 11/15/24  8:04 AM   Result Value Ref Range    Color, POC UA Yellow Yellow, Straw, Colorless    Clarity, POC UA Clear Clear    Glucose, POC UA Negative Negative    Bilirubin, POC UA Negative Negative    Ketones, POC UA Negative Negative    Spec Grav POC UA 1.020 1.005 - 1.030    Blood, POC UA Trace-intact (A) Negative    pH, POC UA 5.5 5.0 - 8.0    Protein, POC UA Negative Negative    Urobilinogen, POC UA 0.2 <=1.0    Nitrite, POC UA Negative Negative    WBC, POC UA Small (A) Negative   Urine culture    Collection Time: 11/15/24  8:26 AM    Specimen: Urine, Catheterized   Result Value Ref Range    Urine Culture, Routine No growth    Urinalysis Microscopic    Collection Time: 11/15/24  8:26 AM   Result Value Ref Range    RBC, UA 0 0 - 4 /hpf    Squam Epithel, UA 1 /hpf    Microscopic Comment SEE COMMENT    Mycoplasma genitalium Molecular Detection, PCR Urine    Collection Time: 11/15/24 10:14 AM   Result Value Ref Range    Mycoplasma Genitalium Specimen Source URINE     Mycoplasma Genitalium Result Negative " Negative         Imaging: DEXA, Xrays, MRIs, CTs, etc    Old & Outside Medical Records:  Reviewed old and all outside medical records available in Care Everywhere     Assessment:     Encounter Diagnoses   Name Primary?    Thrush of mouth and esophagus Yes    Sjogren's syndrome with other organ involvement     CVID (common variable immunodeficiency)     Chronic maxillary sinusitis     Frequent UTI     Primary osteoarthritis of both knees     Herpes zoster with complication     Cystocele without uterine prolapse     Common variable immunodeficiency, unspecified     Sjogren syndrome, unspecified     Vaginal dryness     Sinus abscess        Plan:      Encounter Diagnoses   Name Primary?    Thrush of mouth and esophagus Yes    Sjogren's syndrome with other organ involvement     CVID (common variable immunodeficiency)     Chronic maxillary sinusitis     Frequent UTI     Primary osteoarthritis of both knees     Herpes zoster with complication     Cystocele without uterine prolapse     Common variable immunodeficiency, unspecified     Sjogren syndrome, unspecified     Vaginal dryness     Sinus abscess      Thrush of mouth and esophagus  -     fluconazole (DIFLUCAN) 200 MG Tab; Take 1 tablet (200 mg total) by mouth once daily. for 14 days  Dispense: 14 tablet; Refill: 1  -     nystatin (MYCOSTATIN) 100,000 unit/mL suspension; Take 6 mLs (600,000 Units total) by mouth 4 (four) times daily. for 20 days  Dispense: 240 mL; Refill: 1    Sjogren's syndrome with other organ involvement  -     Lymphocyte Profile II; Future; Expected date: 02/17/2025  -     IgA; Future; Expected date: 02/17/2025  -     Humoral Immune Eval (Pneumo Serotypes) With H. Flu; Future; Expected date: 02/17/2025  -     IgM; Future; Expected date: 02/17/2025  -     IgG; Future; Expected date: 02/17/2025  -     IgG 1, 2, 3, and 4; Future; Expected date: 02/17/2025  -     ibuprofen-famotidine (DUEXIS) 800-26.6 mg Tab; Take 1 tablet by mouth 3 (three) times  daily.  Dispense: 90 tablet; Refill: 12  -     predniSONE (DELTASONE) 1 MG tablet; Take 4 tablets (4 mg total) by mouth daily as needed (flare).  Dispense: 120 tablet; Refill: 1  -     Lymphocyte Profile II; Future; Expected date: 02/17/2025  -     IgA; Future; Expected date: 02/17/2025  -     Humoral Immune Eval (Pneumo Serotypes) With H. Flu; Future; Expected date: 02/17/2025  -     IgM; Future; Expected date: 02/17/2025  -     IgG; Future; Expected date: 02/17/2025  -     IgG 1, 2, 3, and 4; Future; Expected date: 02/17/2025    CVID (common variable immunodeficiency)  -     Lymphocyte Profile II; Future; Expected date: 02/17/2025  -     IgA; Future; Expected date: 02/17/2025  -     Humoral Immune Eval (Pneumo Serotypes) With H. Flu; Future; Expected date: 02/17/2025  -     IgM; Future; Expected date: 02/17/2025  -     IgG; Future; Expected date: 02/17/2025  -     IgG 1, 2, 3, and 4; Future; Expected date: 02/17/2025  -     ibuprofen-famotidine (DUEXIS) 800-26.6 mg Tab; Take 1 tablet by mouth 3 (three) times daily.  Dispense: 90 tablet; Refill: 12  -     Ambulatory referral/consult to Urogynecology; Future; Expected date: 02/24/2025  -     Ambulatory referral/consult to Allergy; Future; Expected date: 02/24/2025  -     doxycycline (VIBRA-TABS) 100 MG tablet; Take 1 tablet (100 mg total) by mouth 2 (two) times daily. for 14 days  Dispense: 28 tablet; Refill: 0  -     cefTRIAXone injection 1 g  -     methylPREDNISolone acetate injection 80 mg  -     ketorolac injection 60 mg  -     predniSONE (DELTASONE) 1 MG tablet; Take 4 tablets (4 mg total) by mouth daily as needed (flare).  Dispense: 120 tablet; Refill: 1  -     Lymphocyte Profile II; Future; Expected date: 02/17/2025  -     IgA; Future; Expected date: 02/17/2025  -     Humoral Immune Eval (Pneumo Serotypes) With H. Flu; Future; Expected date: 02/17/2025  -     IgM; Future; Expected date: 02/17/2025  -     IgG; Future; Expected date: 02/17/2025  -     IgG 1, 2,  3, and 4; Future; Expected date: 02/17/2025    Chronic maxillary sinusitis  -     Lymphocyte Profile II; Future; Expected date: 02/17/2025  -     IgA; Future; Expected date: 02/17/2025  -     Humoral Immune Eval (Pneumo Serotypes) With H. Flu; Future; Expected date: 02/17/2025  -     IgM; Future; Expected date: 02/17/2025  -     IgG; Future; Expected date: 02/17/2025  -     IgG 1, 2, 3, and 4; Future; Expected date: 02/17/2025  -     ibuprofen-famotidine (DUEXIS) 800-26.6 mg Tab; Take 1 tablet by mouth 3 (three) times daily.  Dispense: 90 tablet; Refill: 12  -     Ambulatory referral/consult to Allergy; Future; Expected date: 02/24/2025  -     doxycycline (VIBRA-TABS) 100 MG tablet; Take 1 tablet (100 mg total) by mouth 2 (two) times daily. for 14 days  Dispense: 28 tablet; Refill: 0  -     cefTRIAXone injection 1 g  -     methylPREDNISolone acetate injection 80 mg  -     ketorolac injection 60 mg  -     Lymphocyte Profile II; Future; Expected date: 02/17/2025  -     IgA; Future; Expected date: 02/17/2025  -     Humoral Immune Eval (Pneumo Serotypes) With H. Flu; Future; Expected date: 02/17/2025  -     IgM; Future; Expected date: 02/17/2025  -     IgG; Future; Expected date: 02/17/2025  -     IgG 1, 2, 3, and 4; Future; Expected date: 02/17/2025    Frequent UTI  -     Lymphocyte Profile II; Future; Expected date: 02/17/2025  -     IgA; Future; Expected date: 02/17/2025  -     Humoral Immune Eval (Pneumo Serotypes) With H. Flu; Future; Expected date: 02/17/2025  -     IgM; Future; Expected date: 02/17/2025  -     IgG; Future; Expected date: 02/17/2025  -     IgG 1, 2, 3, and 4; Future; Expected date: 02/17/2025  -     ibuprofen-famotidine (DUEXIS) 800-26.6 mg Tab; Take 1 tablet by mouth 3 (three) times daily.  Dispense: 90 tablet; Refill: 12  -     Ambulatory referral/consult to Allergy; Future; Expected date: 02/24/2025  -     doxycycline (VIBRA-TABS) 100 MG tablet; Take 1 tablet (100 mg total) by mouth 2 (two)  times daily. for 14 days  Dispense: 28 tablet; Refill: 0  -     cefTRIAXone injection 1 g  -     methylPREDNISolone acetate injection 80 mg  -     ketorolac injection 60 mg  -     predniSONE (DELTASONE) 1 MG tablet; Take 4 tablets (4 mg total) by mouth daily as needed (flare).  Dispense: 120 tablet; Refill: 1  -     Lymphocyte Profile II; Future; Expected date: 02/17/2025  -     IgA; Future; Expected date: 02/17/2025  -     Humoral Immune Eval (Pneumo Serotypes) With H. Flu; Future; Expected date: 02/17/2025  -     IgM; Future; Expected date: 02/17/2025  -     IgG; Future; Expected date: 02/17/2025  -     IgG 1, 2, 3, and 4; Future; Expected date: 02/17/2025    Primary osteoarthritis of both knees  -     Lymphocyte Profile II; Future; Expected date: 02/17/2025  -     IgA; Future; Expected date: 02/17/2025  -     Humoral Immune Eval (Pneumo Serotypes) With H. Flu; Future; Expected date: 02/17/2025  -     IgM; Future; Expected date: 02/17/2025  -     IgG; Future; Expected date: 02/17/2025  -     IgG 1, 2, 3, and 4; Future; Expected date: 02/17/2025  -     ibuprofen-famotidine (DUEXIS) 800-26.6 mg Tab; Take 1 tablet by mouth 3 (three) times daily.  Dispense: 90 tablet; Refill: 12  -     Ambulatory referral/consult to Urogynecology; Future; Expected date: 02/24/2025  -     doxycycline (VIBRA-TABS) 100 MG tablet; Take 1 tablet (100 mg total) by mouth 2 (two) times daily. for 14 days  Dispense: 28 tablet; Refill: 0  -     cefTRIAXone injection 1 g  -     methylPREDNISolone acetate injection 80 mg  -     ketorolac injection 60 mg  -     Lymphocyte Profile II; Future; Expected date: 02/17/2025  -     IgA; Future; Expected date: 02/17/2025  -     Humoral Immune Eval (Pneumo Serotypes) With H. Flu; Future; Expected date: 02/17/2025  -     IgM; Future; Expected date: 02/17/2025  -     IgG; Future; Expected date: 02/17/2025  -     IgG 1, 2, 3, and 4; Future; Expected date: 02/17/2025    Herpes zoster with complication  -      Lymphocyte Profile II; Future; Expected date: 02/17/2025  -     IgA; Future; Expected date: 02/17/2025  -     Humoral Immune Eval (Pneumo Serotypes) With H. Flu; Future; Expected date: 02/17/2025  -     IgM; Future; Expected date: 02/17/2025  -     IgG; Future; Expected date: 02/17/2025  -     IgG 1, 2, 3, and 4; Future; Expected date: 02/17/2025    Cystocele without uterine prolapse  -     Ambulatory referral/consult to Urogynecology; Future; Expected date: 02/24/2025    Common variable immunodeficiency, unspecified  -     hydroxychloroquine (PLAQUENIL) 200 mg tablet; Take 1 tablet (200 mg total) by mouth 2 (two) times daily.  Dispense: 180 tablet; Refill: 1    Sjogren syndrome, unspecified  -     hydroxychloroquine (PLAQUENIL) 200 mg tablet; Take 1 tablet (200 mg total) by mouth 2 (two) times daily.  Dispense: 180 tablet; Refill: 1  -     cevimeline (EVOXAC) 30 mg capsule; Take 1 capsule (30 mg total) by mouth 3 (three) times daily.  Dispense: 90 capsule; Refill: 11  -     nystatin (MYCOSTATIN) 100,000 unit/mL suspension; Take 6 mLs (600,000 Units total) by mouth 4 (four) times daily. for 20 days  Dispense: 240 mL; Refill: 1  -     ospemifene (OSPHENA) 60 mg Tab; Take 60 mg by mouth Daily.  Dispense: 30 tablet; Refill: 6  -     doxycycline (VIBRA-TABS) 100 MG tablet; Take 1 tablet (100 mg total) by mouth 2 (two) times daily. for 14 days  Dispense: 28 tablet; Refill: 0    Vaginal dryness  -     ospemifene (OSPHENA) 60 mg Tab; Take 60 mg by mouth Daily.  Dispense: 30 tablet; Refill: 6    Sinus abscess        1. Consider scig/ivig  2. Add evoxac, osphena, referral allergy and urogyn  3. F/u 6 months     Follow-up 6 months  More than 50% of the  40 minute encounter was spent face to face counseling the patient regarding current status and future plan of care as well as side effects  of the medications. All questions were answered to patient's satisfaction also includes  non-face to face time preparing to see the  patient (eg, review of tests), Obtaining and/or reviewing separately obtained history, Documenting clinical information in the electronic or other health record, Independently interpreting results          [1]   Social History  Tobacco Use    Smoking status: Never    Smokeless tobacco: Never   Substance Use Topics    Alcohol use: No    Drug use: No

## 2025-02-17 NOTE — LETTER
February 17, 2025        No Recipients             [facility]  [address]  [place] [address]  Phone: [phone]  Fax: [phone]   Patient: Crystal Hein   MR Number: [id number]   YOB: 1965   Date of Visit: 2/17/2025     Dear  No Recipients,     {WILDCARD:[id number]}    Sincerely,      Ghassan Truong MD            CC  No Recipients    Enclosure

## 2025-02-22 LAB
C DIPHTHERIAE AB SER IA-ACNC: 0.12 IU/ML
C TETANI TOXOID AB SER-ACNC: 0.63 IU/ML
CD19 CELLS # BLD: 257 CELLS/UL (ref 110–660)
CD19 CELLS NFR BLD: 9 % (ref 6–29)
CD3 CELLS # BLD: 2280 CELLS/UL (ref 840–3060)
CD3 CELLS NFR BLD: 85 % (ref 57–85)
CD3+CD4+ CELLS # BLD: 1048 CELLS/UL (ref 490–1740)
CD3+CD4+ CELLS NFR BLD: 40 % (ref 30–61)
CD3+CD4+ CELLS/CD3+CD8+ CLL BLD: 0.85 % (ref 0.86–5)
CD3+CD8+ CELLS # BLD: 1231 CELLS/UL (ref 180–1170)
CD3+CD8+ CELLS NFR BLD: 47 % (ref 12–42)
IGA SERPL-MCNC: 272 MG/DL (ref 47–310)
IGG SERPL-MCNC: 842 MG/DL (ref 600–1640)
IGG1 SER-MCNC: 377 MG/DL (ref 382–929)
IGG2 SER-MCNC: 257 MG/DL (ref 241–700)
IGG3 SER-MCNC: 87 MG/DL (ref 22–178)
IGG4 SER-MCNC: 10.9 MG/DL (ref 4–86)
IGM SERPL-MCNC: 61 MG/DL (ref 50–300)
LYMPHOCYTES # BLD AUTO: 2686 CELLS/UL (ref 850–3900)

## 2025-02-25 ENCOUNTER — TELEPHONE (OUTPATIENT)
Dept: RHEUMATOLOGY | Facility: CLINIC | Age: 60
End: 2025-02-25
Payer: COMMERCIAL

## 2025-02-25 NOTE — TELEPHONE ENCOUNTER
----- Message from María sent at 2/24/2025  9:57 AM CST -----  Contact: Pavan Rosales @ [phone]  Crystal Hein calling regarding Pharmacy Authorization (message) for #pharmacy is calling to speak with nurse and doctor concerning pt ospemifene (OSPHENA) 60 mg Tab, asking for call back

## 2025-03-20 ENCOUNTER — PATIENT MESSAGE (OUTPATIENT)
Dept: UROLOGY | Facility: CLINIC | Age: 60
End: 2025-03-20

## 2025-03-20 ENCOUNTER — OFFICE VISIT (OUTPATIENT)
Dept: UROLOGY | Facility: CLINIC | Age: 60
End: 2025-03-20
Payer: COMMERCIAL

## 2025-03-20 VITALS — BODY MASS INDEX: 29.97 KG/M2 | HEIGHT: 65 IN | WEIGHT: 179.88 LBS

## 2025-03-20 DIAGNOSIS — N89.8 VAGINAL DISCHARGE: Primary | ICD-10-CM

## 2025-03-20 DIAGNOSIS — N39.3 SUI (STRESS URINARY INCONTINENCE, FEMALE): ICD-10-CM

## 2025-03-20 LAB
BILIRUBIN, UA POC OHS: NEGATIVE
BLOOD, UA POC OHS: ABNORMAL
CLARITY, UA POC OHS: CLEAR
COLOR, UA POC OHS: YELLOW
GLUCOSE, UA POC OHS: NEGATIVE
KETONES, UA POC OHS: NEGATIVE
LEUKOCYTES, UA POC OHS: ABNORMAL
NITRITE, UA POC OHS: NEGATIVE
PH, UA POC OHS: 5.5
POC RESIDUAL URINE VOLUME: 18 ML (ref 0–100)
PROTEIN, UA POC OHS: NEGATIVE
SPECIFIC GRAVITY, UA POC OHS: 1.01
UROBILINOGEN, UA POC OHS: 0.2

## 2025-03-20 PROCEDURE — 81515 NFCT DS BV&VAGINITIS DNA ALG: CPT | Performed by: UROLOGY

## 2025-03-20 PROCEDURE — 99999 PR PBB SHADOW E&M-EST. PATIENT-LVL IV: CPT | Mod: PBBFAC,,, | Performed by: UROLOGY

## 2025-03-20 RX ORDER — ESTRADIOL 0.1 MG/G
1 CREAM VAGINAL
Qty: 42.5 G | Refills: 1 | Status: SHIPPED | OUTPATIENT
Start: 2025-03-20 | End: 2026-03-20

## 2025-03-20 NOTE — PROGRESS NOTES
[facility], formerly known as [facility]  Group MD's:Jaqueline/Carlin/Guillermo/Willi  Group NP's: Isabel Chirinos    PCP: Mariella, Primary Doctor  Date of Service: 03/20/2025  Today's note written by: Nova Parsons md      Subjective:     HPI: Crystal Hein is a 59 y.o. female     Initial consult by me in clinic on 3/20/25 :  History of Present Illness    CHIEF COMPLAINT:  Ms. Hein presents with concerns about recurrent urinary tract infections, vaginal leakage, and discomfort during intercourse, possibly related to pelvic organ prolapse.    HPI:  Ms. Hein, a woman approaching 60 years of age, reports urinary and vaginal issues for approximately 2 years. She describes a sensation of pelvic organ prolapse, with feelings of pressure and downward displacement in the pelvic area. She reports stress incontinence, leaking urine when she sneezes or coughs, which has worsened over the last 2 years. She wears a pad daily due to the leakage.     past medical history of common variable immunodeficiency, migraines and Sjogren's syndrome     She also has urinary urgency, sometimes unable to control her bladder before reaching the toilet. She reports frequent urination but denies typical UTI symptoms such as burning during urination. She has been diagnosed with UTIs multiple times, including once at the hospital about a year ago when she thought she had pancreatitis.    She describes vaginal discharge with a paste-like consistency, ongoing for 2 years, with intermittent periods where it stops for a few days before recurring. She denies any foul odor associated with the discharge. Sex is very uncomfortable and sometimes causes bleeding.    She has a history of full hysterectomy at age 28 and is currently receiving hormone shots every 3 weeks from her OBGYN, Dr. Dumont. The vaginal symptoms worsened after starting the hormone shots. She has been diagnosed with  "bacterial vaginosis in the past and has been prescribed treatments, including creams, which she has not used consistently.    She also has a history of Sjogren's syndrome and common variable immunodeficiency (CVID). She undergoes labs every 3-4 months and is in the process of being approved for gamma globulin infusions, which previously helped with recurrent yeast infections.    RAYRAY- 3 to 4 thin to thick pads a day. Wet when she changes it. Urinates every 1hour.   Cvig - plans to restart infusions. Previously helped recurrent yeast utis. Now having multiple yeast infections (oral and vaginal). H/o multiple BV infections. Treated. Sees . hormone injections.   "Uti's" - she doesn't really have any symptoms. Multiple voided urines with wbcs. Including today ua with small leuk. Cath urine however neg for any leukocytes.  [id number]    PERTINENT MEDICATIONS:  Hormone shots, once every 3 weeks, administered by OBGYN Dr. Dumont  Probiotics and prebiotics, taken orally  Discontinued oral hormones a few years ago  Discontinued hormone pellets  Started hormone shots after discontinuing pellets    PERTINENT MEDICAL HISTORY:  Sjogren's syndrome  Common variable immunodeficiency (CVID)  C. difficile infection  Recurrent yeast infections  Recurrent bacterial vaginosis  Pancreatitis    PERTINENT SURGICAL HISTORY:  Total hysterectomy: At age 28    PERTINENT TEST RESULTS:  Urinalysis: Multiple past instances, showed leukocytes  Urinalysis: , 20-40 squamous epithelial cells  Urinalysis: Current visit, small leukocytes and trace blood  Urine culture:  (ER visit), no bacteria growth  Urine culture: Dr. Whitehead's office, one instance negative, one or two instances showed bacteria  Catheterized urine test: Current visit, no leukocytes Urodynamics test: Current visit, first sensation at 90mL, strong desire at 150mL  Stress test: Current visit, patient leaked urine when coughing both lying down and standing " up    SH:SUBSTANCE USE:  Denies smoking  Denies alcohol use         Date: 03/20/2025 Pelvic exam (stress test/cmg to evaluate stress vs urge incontinence) : SUPINE STRESS/LEAKTEST EMPTY: (+) stress test with cough, (+) stress test with valsalva.  In and out cath with 100cc residual - urine was dipped and was negative for wbc. BLADDER FILLING: First sensation to void felt at 80 cc. Significant urge was met (150cc). Bladder filled to 150 cc. The catheter was then removed. SUPINE STRESS/LEAK TEST FULL: (+) stress test with coughing , (+) stress test with valsalva . STANDING STRESS/LEAK TEST FULL:(+) large stress test with COUGH. (+) large  stress test with VALSALVA . Other:: (--)  prolapse. (--) atrophic vaginitis. (--) urethral caruncle. (+) vaginal discharge (was sent for vaginosis screen). Assessment/notes:  + RAYRAY. Recheck pvr . 100 today but voided 30 min ago.       Urine history: family history of kidney, bladder or prostate cancer:No, personal or family history of kidney stones: No,tobacco use: No, anticoagulation: No  3/20/25 Void: tr bld, small wbc  11/15/24 Ng, void: tr bld, small wbc  11/11/24 Void: tr bld/2+leuk, 20-40 SQ, >70 wbc/many bact  12/8/23 Ng, Void: small leuk/tr bld-  no trichomonas, no    2/6/23  Void: 3+leuk  7/29/21 Void: neg  11/7/14  Void:neg  11/2/12   Void: neg           REVIEW OF SYSTEMS:  Negative except for as stated above    Past Medical History:   Diagnosis Date    Anemia     Celiac disease/sprue     Frequent headaches     Immunodeficiency     Osteoarthritis     Pneumonia     Sjogren's disease        Past Surgical History:   Procedure Laterality Date    ADENOIDECTOMY      AUGMENTATION OF BREAST      BREAST BIOPSY      HYSTERECTOMY      knee  for ACL      OOPHORECTOMY      MI EXPLORATORY OF ABDOMEN      TONSILLECTOMY      tummy tuck              Objective:     There were no vitals filed for this visit.    Physical Exam    Female Genitourinary: Excess tissue present.      "        Assessment:     Crystal Hein is a 59 y.o. female with     1. Vaginal discharge    2. RAYRAY (stress urinary incontinence, female)          Plan:     's typed/written- Abbreviated/Short Plan:  "Multiple utis" likely just contaminated urines. In future if there is a concerns needs a catheterized urine sample for urinalysis AND culture  Vaginal discharge-  Send vaginosis screen today, will treat  Suspect will improve with IVIG  Start hormone cream once vaginal "infection" treated if culture returns +. Nightly for 2 weeks then every other night indefinitely. Use finger. If expensive. Use goodrx. See instrucitons.  Utiva womens probiotic once daily.   Also talk to Dr.Barry Rayray robertson, timed voiding every 2 to 3 hours during day.. fu in 3  months and if still having significant Rayray, can schedule bulkamid. Prefer after she's started ivig.    Fu in 3 months to discuss RAYRAY, check immediate pvr and compare voided to cath urine when doing that agin.       The following assessment plan was created by [employer] via ambient listening:  Assessment & Plan    N89.8 Vaginal discharge  N39.3 RAYRAY (stress urinary incontinence, female)    IMPRESSION:   Patient presenting with recurrent urinary tract infection symptoms, but catheterized urine sample negative for leukocytes, suggesting possible misdiagnosis.   Suspect vaginal discharge due to bacterial vaginosis or yeast infection, potentially exacerbated by CVID.   Observed mild stress incontinence during exam, not significant enough for immediate surgical intervention.   Noted slight cystocele, not significant enough to require treatment at this time.   Hormone deficiency likely contributing to vaginal symptoms; recommended topical hormone cream.   Considered Bulkamid procedure for stress incontinence if symptoms persist after conservative management.    Please review the short plan as above for concise and accurate plan. The dictated/AI generated plan may " have some inaccuracies .    PLAN SUMMARY:   Collected catheterized urine sample and vaginal swab for analysis   Started hormone cream for vaginal use   Recommend daily women's probiotic (Utiva brand)   Referred to gynecologist, Dr. Dumont, for vaginal symptoms and hormone therapy   Instructed to perform Kegel exercises and practice timed voiding   Scheduled immediate post-void residual check   Follow up in 3 months to reassess stress incontinence symptoms    VAGINAL DISCHARGE:   Explained the difference between urinalysis and urine culture results, emphasizing the importance of culture for diagnosing true UTIs and potential harm of unnecessary antibiotic use.   Educated on maintaining a healthy vaginal microbiome through probiotics and hormone balance, noting the connection between CVID and increased susceptibility to vaginal infections.   Started hormone cream for vaginal use and recommended daily women's probiotic (Utiva brand).   Sent vaginal swab for vaginosis screen analysis.   Referred to gynecologist, Dr. Dumont, for further discussion of vaginal symptoms and hormone therapy.    RAYRAY (STRESS URINARY INCONTINENCE, FEMALE):   Instructed patient to perform regular Kegel exercises and practice timed voiding every 2-3 hours during the day.   Collected and analyzed catheterized urine sample during visit.   Scheduled immediate post-void residual check to compare voided to catheterized urine volume.   Follow up in 3 months to reassess stress incontinence symptoms.    DIGESTIVE HEALTH:   Recommend incorporating fermented foods (e.g., kimchi, sauerkraut) into diet for natural probiotics.             Portions of this note was generated with the assistance of ambient listening technology. Verbal consent was obtained by the patient and accompanying visitor(s) for the recording of patient appointment to facilitate this note. I attest to having reviewed and edited the generated note for accuracy, though some syntax or spelling  errors may persist. Please contact the author of this note for any clarification.          Nova Parsons MD

## 2025-03-21 LAB
BACTERIAL VAGINOSIS DNA: NOT DETECTED
CANDIDA GLABRATA/KRUSEI: NOT DETECTED
CANDIDA RRNA VAG QL PROBE: NOT DETECTED
TRICHOMONAS VAGINALIS: NOT DETECTED

## 2025-03-21 NOTE — PATIENT INSTRUCTIONS
"'s typed/written- Abbreviated/Short Plan:  "Multiple utis" likely just contaminated urines. In future if there is a concerns needs a catheterized urine sample for urinalysis AND culture  Vaginal discharge-  Send vaginosis screen today, will treat  Suspect will improve with IVIG  Start hormone cream once vaginal "infection" treated if culture returns +. Nightly for 2 weeks then every other night indefinitely. Use finger. If expensive. Use goodrx. See instrucitons.  Utiva womens probiotic once daily.   Also talk to Dr.Barry Rayray robertson, timed voiding every 2 to 3 hours during day.. fu in 3  months and if still having significant Rayray, can schedule bulkamid. Prefer after she's started ivig.    Fu in 3 months to discuss RAYRAY, check immediate pvr and compare voided to cath urine when doing that agin.       The following assessment plan was created by [employer] via ambient listening:  Assessment & Plan    N89.8 Vaginal discharge  N39.3 RAYRAY (stress urinary incontinence, female)    IMPRESSION:   Patient presenting with recurrent urinary tract infection symptoms, but catheterized urine sample negative for leukocytes, suggesting possible misdiagnosis.   Suspect vaginal discharge due to bacterial vaginosis or yeast infection, potentially exacerbated by CVID.   Observed mild stress incontinence during exam, not significant enough for immediate surgical intervention.   Noted slight cystocele, not significant enough to require treatment at this time.   Hormone deficiency likely contributing to vaginal symptoms; recommended topical hormone cream.   Considered Bulkamid procedure for stress incontinence if symptoms persist after conservative management.    Please review the short plan as above for concise and accurate plan. The dictated/AI generated plan may have some inaccuracies .    PLAN SUMMARY:   Collected catheterized urine sample and vaginal swab for analysis   Started hormone cream for vaginal use   Recommend " daily women's probiotic (Utiva brand)   Referred to gynecologist, Dr. Dumont, for vaginal symptoms and hormone therapy   Instructed to perform Kegel exercises and practice timed voiding   Scheduled immediate post-void residual check   Follow up in 3 months to reassess stress incontinence symptoms    VAGINAL DISCHARGE:   Explained the difference between urinalysis and urine culture results, emphasizing the importance of culture for diagnosing true UTIs and potential harm of unnecessary antibiotic use.   Educated on maintaining a healthy vaginal microbiome through probiotics and hormone balance, noting the connection between CVID and increased susceptibility to vaginal infections.   Started hormone cream for vaginal use and recommended daily women's probiotic (Utiva brand).   Sent vaginal swab for vaginosis screen analysis.   Referred to gynecologist, Dr. Dumont, for further discussion of vaginal symptoms and hormone therapy.    RAYRAY (STRESS URINARY INCONTINENCE, FEMALE):   Instructed patient to perform regular Kegel exercises and practice timed voiding every 2-3 hours during the day.   Collected and analyzed catheterized urine sample during visit.   Scheduled immediate post-void residual check to compare voided to catheterized urine volume.   Follow up in 3 months to reassess stress incontinence symptoms.    DIGESTIVE HEALTH:   Recommend incorporating fermented foods (e.g., kimchi, sauerkraut) into diet for natural probiotics.             Portions of this note was generated with the assistance of ambient listening technology. Verbal consent was obtained by the patient and accompanying visitor(s) for the recording of patient appointment to facilitate this note. I attest to having reviewed and edited the generated note for accuracy, though some syntax or spelling errors may persist. Please contact the author of this note for any clarification.

## 2025-03-23 ENCOUNTER — RESULTS FOLLOW-UP (OUTPATIENT)
Dept: UROLOGY | Facility: CLINIC | Age: 60
End: 2025-03-23

## 2025-03-29 DIAGNOSIS — R82.71 BACTERIURIA: ICD-10-CM

## 2025-03-29 DIAGNOSIS — N39.0 FREQUENT UTI: ICD-10-CM

## 2025-03-29 DIAGNOSIS — R30.0 DYSURIA: ICD-10-CM

## 2025-04-01 RX ORDER — METHENAMINE, SODIUM PHOSPHATE, MONOBASIC, ANHYDROUS, PHENYL SALICYLATE, METHYLENE BLUE AND HYOSCYAMINE SULFATE 118; 40.8; 36; 10; .12 MG/1; MG/1; MG/1; MG/1; MG/1
1 CAPSULE ORAL 4 TIMES DAILY PRN
Qty: 40 CAPSULE | Refills: 0 | Status: SHIPPED | OUTPATIENT
Start: 2025-04-01

## 2025-06-09 ENCOUNTER — OFFICE VISIT (OUTPATIENT)
Dept: RHEUMATOLOGY | Facility: CLINIC | Age: 60
End: 2025-06-09
Payer: COMMERCIAL

## 2025-06-09 VITALS
HEIGHT: 65 IN | BODY MASS INDEX: 29.05 KG/M2 | HEART RATE: 92 BPM | DIASTOLIC BLOOD PRESSURE: 72 MMHG | WEIGHT: 174.38 LBS | SYSTOLIC BLOOD PRESSURE: 129 MMHG

## 2025-06-09 DIAGNOSIS — B37.9 YEAST INFECTION: ICD-10-CM

## 2025-06-09 DIAGNOSIS — M25.569 ACUTE KNEE PAIN, UNSPECIFIED LATERALITY: ICD-10-CM

## 2025-06-09 DIAGNOSIS — B02.8 HERPES ZOSTER WITH COMPLICATION: ICD-10-CM

## 2025-06-09 DIAGNOSIS — G47.00 INSOMNIA, UNSPECIFIED TYPE: ICD-10-CM

## 2025-06-09 DIAGNOSIS — L40.50 PSA (PSORIATIC ARTHRITIS): Primary | ICD-10-CM

## 2025-06-09 PROCEDURE — 3074F SYST BP LT 130 MM HG: CPT | Mod: CPTII,S$GLB,, | Performed by: INTERNAL MEDICINE

## 2025-06-09 PROCEDURE — 3078F DIAST BP <80 MM HG: CPT | Mod: CPTII,S$GLB,, | Performed by: INTERNAL MEDICINE

## 2025-06-09 PROCEDURE — 99215 OFFICE O/P EST HI 40 MIN: CPT | Mod: 25,S$GLB,, | Performed by: INTERNAL MEDICINE

## 2025-06-09 PROCEDURE — 1159F MED LIST DOCD IN RCRD: CPT | Mod: CPTII,S$GLB,, | Performed by: INTERNAL MEDICINE

## 2025-06-09 PROCEDURE — 96372 THER/PROPH/DIAG INJ SC/IM: CPT | Mod: S$GLB,,, | Performed by: INTERNAL MEDICINE

## 2025-06-09 PROCEDURE — 3008F BODY MASS INDEX DOCD: CPT | Mod: CPTII,S$GLB,, | Performed by: INTERNAL MEDICINE

## 2025-06-09 PROCEDURE — 99999 PR PBB SHADOW E&M-EST. PATIENT-LVL V: CPT | Mod: PBBFAC,,, | Performed by: INTERNAL MEDICINE

## 2025-06-09 RX ORDER — APREMILAST 10-20-30MG
KIT ORAL
Qty: 55 TABLET | Refills: 0 | Status: SHIPPED | OUTPATIENT
Start: 2025-06-09

## 2025-06-09 RX ORDER — CYANOCOBALAMIN 1000 UG/ML
1000 INJECTION, SOLUTION INTRAMUSCULAR; SUBCUTANEOUS
Status: COMPLETED | OUTPATIENT
Start: 2025-06-09 | End: 2025-06-09

## 2025-06-09 RX ORDER — KETOROLAC TROMETHAMINE 30 MG/ML
60 INJECTION, SOLUTION INTRAMUSCULAR; INTRAVENOUS
Status: COMPLETED | OUTPATIENT
Start: 2025-06-09 | End: 2025-06-09

## 2025-06-09 RX ORDER — APREMILAST 30 MG/1
30 TABLET, FILM COATED ORAL 2 TIMES DAILY
Qty: 60 TABLET | Refills: 11 | Status: SHIPPED | OUTPATIENT
Start: 2025-06-09 | End: 2026-06-09

## 2025-06-09 RX ORDER — OXYCODONE AND ACETAMINOPHEN 7.5; 325 MG/1; MG/1
1 TABLET ORAL EVERY 6 HOURS PRN
Qty: 21 TABLET | Refills: 0 | Status: SHIPPED | OUTPATIENT
Start: 2025-06-09 | End: 2025-06-16

## 2025-06-09 RX ORDER — FLUCONAZOLE 150 MG/1
150 TABLET ORAL DAILY
Qty: 7 TABLET | Refills: 2 | Status: SHIPPED | OUTPATIENT
Start: 2025-06-09

## 2025-06-09 RX ORDER — TRIAZOLAM 0.25 MG/1
0.25 TABLET ORAL NIGHTLY
Qty: 1 TABLET | Refills: 0 | Status: SHIPPED | OUTPATIENT
Start: 2025-06-09

## 2025-06-09 RX ADMIN — KETOROLAC TROMETHAMINE 60 MG: 30 INJECTION, SOLUTION INTRAMUSCULAR; INTRAVENOUS at 10:06

## 2025-06-09 RX ADMIN — CYANOCOBALAMIN 1000 MCG: 1000 INJECTION, SOLUTION INTRAMUSCULAR; SUBCUTANEOUS at 10:06

## 2025-06-09 ASSESSMENT — ROUTINE ASSESSMENT OF PATIENT INDEX DATA (RAPID3)
FATIGUE SCORE: 1.1
MDHAQ FUNCTION SCORE: 0.3
TOTAL RAPID3 SCORE: 4
PAIN SCORE: 5
PATIENT GLOBAL ASSESSMENT SCORE: 6
PSYCHOLOGICAL DISTRESS SCORE: 0

## 2025-06-09 NOTE — PROGRESS NOTES
Subjective:     Patient ID:  Crystal Hein    Chief Complaint:  Disease Management     History of Present Illness:  Pt is a 59 y.o. female  she has psa ra, sleand sjogren's on plaquenil,she has severe thrush, severe constipation, h/o C'difficle and pancreatitis. She had  c'difficle again. .Pain is located in multiple joints, both shoulder(s), both elbow(s), both wrist(s), both MCP(s): 1st, 2nd, 3rd, 4th and 5th, both PIP(s): 1st, 2nd, 3rd, 4th and 5th, both DIP(s): 1st and 2nd, both hip(s), both knee(s) and both MTP(s): 1st, 2nd, 3rd, 4th and 5th, is described as aching, pulsating, shooting and throbbing, and is constant, moderate . Severe neck pain she has a mass over her C spine, decrease rom upper movement and stays inflammed.       Rheumatologic History:   - Diagnosis/es:  - Positive serologies:  - Infectious screening labs:  - Previous Treatments:  - Current Treatments: plaquenil    Interval History:   Hospitalization since last office visit: No    Patient Active Problem List    Diagnosis Date Noted    Psoriatic arthritis 06/11/2025    Systemic lupus erythematosus 02/14/2024    Sjogren's syndrome 04/20/2018    Chest pain radiating to arm 07/10/2015    Immunodeficiency, common variable 07/10/2015    Neck pain, bilateral 07/10/2015    Sjogren's disease 11/07/2014    Reactive arthritis 11/07/2014    Osteoarthritis of back 11/07/2014     Past Surgical History:   Procedure Laterality Date    ADENOIDECTOMY      AUGMENTATION OF BREAST      BREAST BIOPSY      HYSTERECTOMY      knee  for ACL      OOPHORECTOMY      NM EXPLORATORY OF ABDOMEN      TONSILLECTOMY      tummy tuck       Social History[1]  No family history on file.  Review of patient's allergies indicates:   Allergen Reactions    Dexamethasone Other (See Comments)     Paranoia and anxiety    Imitrex [sumatriptan succinate] Anaphylaxis    Azulfidine [sulfasalazine]     Flagyl [metronidazole hcl] Dermatitis    Pyridium [phenazopyridine]        Review of  Systems   Review of Systems     Current Medications:  Current Outpatient Medications   Medication Instructions    acyclovir (ZOVIRAX) 800 mg, 5 times daily    apremilast (OTEZLA STARTER) 10 mg (4)-20 mg (4)-30 mg (47) DsPk As directed    azelastine (ASTELIN) 137 mcg (0.1 %) nasal spray use 1-2 sprays IN EACH NOSTRIL TWICE DAILY FOR allergies    bisacodyL (DULCOLAX) 5 mg EC tablet TAKE TWO TABLETS BY MOUTH as directed for ONE day    boric acid (PH-D) 600 mg, Vaginal, Nightly    cevimeline (EVOXAC) 30 mg, Oral, 3 times daily    dicyclomine (BENTYL) 20 mg tablet TAKE ONE TABLET BY MOUTH EVERY 6 HOURS    ergocalciferol (ERGOCALCIFEROL) 50,000 Units, Oral, Every 7 days    estradioL (ESTRACE) 1 g, Vaginal, Twice weekly, Use 1 g on finger and apply twice a week after using nightly for 2 weeks    fluconazole (DIFLUCAN) 150 mg, Oral, Daily    fluticasone propionate (FLONASE) 50 mcg/actuation nasal spray 2 sprays, Daily PRN    furosemide (LASIX) 10-20 mg, Daily PRN    hydroxychloroquine (PLAQUENIL) 200 mg, Oral, 2 times daily    ketorolac (TORADOL) 10 mg tablet TAKE ONE TABLET BY MOUTH EVERY 6 HOURS FOR 5 DAYS    lansoprazole (PREVACID) 30 MG capsule TAKE ONE CAPSULE BY MOUTH ONCE DAILY    levocetirizine (XYZAL) 5 mg, Oral, Nightly    meclizine (ANTIVERT) 25 mg tablet TAKE ONE TABLET BY MOUTH THREE TIMES DAILY AS NEEDED    montelukast (SINGULAIR) 10 mg tablet TAKE ONE TABLET BY MOUTH EVERY EVENING    nystatin (MYCOSTATIN) 100,000 unit/mL suspension 5 mLs, 4 times daily    ondansetron (ZOFRAN-ODT) 4 mg, Oral, Every 6 hours PRN    ospemifene (OSPHENA) 60 mg, Oral, Daily    OTEZLA 30 mg, Oral, 2 times daily    oxyCODONE-acetaminophen (PERCOCET) 7.5-325 mg per tablet 1 tablet, Oral, Every 6 hours PRN    pantoprazole (PROTONIX) 40 MG tablet TAKE ONE TABLET BY MOUTH once daily    predniSONE (DELTASONE) 4 mg, Oral, Daily PRN    promethazine (PHENERGAN) 25 mg, Oral, Every 4 hours    spironolactone (ALDACTONE) 50 MG tablet Take by  "mouth.    sucralfate (CARAFATE) 1 g, Oral, 4 times daily    triamcinolone acetonide 0.1% (KENALOG) 0.1 % ointment Apply topically.    triazolam 0.25 mg, Oral, Nightly, Administer dose once 30 minutes prior to cataract surgery    URO--10-40.8-36 mg Cap 1 capsule, Oral, 4 times daily PRN    valACYclovir (VALTREX) 1,000 mg, Oral, Every 12 hours         Objective:     Vitals:    06/09/25 0841   BP: 129/72   Pulse: 92   Weight: 79.1 kg (174 lb 6.1 oz)   Height: 5' 5" (1.651 m)   PainSc:   5      Body mass index is 29.02 kg/m².     Physical Examinations:  Physical Exam   Constitutional: She is oriented to person, place, and time.   HENT:   Head: Normocephalic and atraumatic.   Mouth/Throat: Oropharynx is clear and moist.   Eyes: Pupils are equal, round, and reactive to light.   Neck: No thyromegaly present.   Cardiovascular: Normal rate, regular rhythm and normal heart sounds. Exam reveals no gallop and no friction rub.   No murmur heard.  Pulmonary/Chest: Breath sounds normal. She has no wheezes. She has no rales. She exhibits no tenderness.   Abdominal: There is no abdominal tenderness. There is no rebound and no guarding.   Musculoskeletal:         General: Deformity present.      Right shoulder: Tenderness present.      Left shoulder: Tenderness present.      Right elbow: Normal.      Left elbow: Normal.      Right wrist: Swelling and tenderness present.      Left wrist: Swelling and tenderness present.      Cervical back: Neck supple.      Right knee: No effusion. Tenderness present.      Left knee: No effusion. Tenderness present.      Left ankle: Swelling present.   Lymphadenopathy:     She has no cervical adenopathy.   Neurological: She is alert and oriented to person, place, and time. Gait normal.   Skin: No rash noted. No erythema. No pallor.   Psychiatric: Mood and affect normal.   Nursing note and vitals reviewed.      Right Side Rheumatological Exam     Examination finds the elbow normal.    The patient " is tender to palpation of the shoulder, wrist, knee, 1st PIP, 1st MCP, 2nd PIP, 2nd MCP, 3rd PIP, 3rd MCP, 4th PIP, 4th MCP, 5th PIP and 5th MCP    She has swelling of the wrist, 1st PIP, 1st MCP, 2nd PIP, 2nd MCP, 3rd PIP, 3rd MCP, 4th PIP, 4th MCP, 5th PIP and 5th MCP    The patient has an enlarged wrist    Shoulder Exam   Tenderness Location: no tenderness    Range of Motion   Active abduction:  abnormal   Adduction: abnormal  Sensation: normal    Knee Exam   Tenderness Location: lateral joint line  Patellofemoral Crepitus: positive  Effusion: negative  Sensation: normal    Hip Exam   Tenderness Location: posterior  Sensation: normal    Elbow/Wrist Exam   Tenderness Location: no tenderness  Sensation: normal    Muscle Strength (0-5 scale):  Neck Flexion:  2  Neck Extension: 2  : 2/5     Left Side Rheumatological Exam     Examination finds the elbow normal.    The patient is tender to palpation of the shoulder, wrist, knee, 1st PIP, 1st MCP, 2nd PIP, 2nd MCP, 3rd PIP, 3rd MCP, 4th PIP, 4th MCP, 5th PIP, 5th MCP and temporomandibular.    She has swelling of the wrist, 1st PIP, 1st MCP, 2nd PIP, 2nd MCP, 3rd PIP, 3rd MCP, 4th PIP, 4th MCP, 5th PIP, 5th MCP, 1st CMC, 2nd DIP, 3rd DIP, 4th DIP, 5th DIP, knee, 1st MTP, 2nd MTP, 3rd MTP, 4th MTP, 1st toe IP, 2nd toe IP, 3rd toe IP, 4th toe IP and 5th toe IP    The patient has an enlarged wrist, 1st CMC, 2nd DIP, 3rd DIP, 4th DIP, 5th DIP, 1st toe IP, 2nd toe IP, 3rd toe IP, 4th toe IP and 5th toe IP.    Shoulder Exam   Tenderness Location: acromioclavicular joint    Range of Motion   Active abduction:  abnormal   Sensation: normal    Knee Exam     Patellofemoral Crepitus: positive  Effusion: negative  Sensation: normal    Hip Exam   Tenderness Location: posterior  Sensation: normal    Elbow/Wrist Exam   Sensation: normal    Muscle Strength (0-5 scale):  Neck Flexion:  2  Neck Extension: 2  :  1/5       Back/Neck Exam   General Inspection   Gait: normal         "    Disease Assessment Scores:  Patient's Global Assessment of arthritis (0-10): 1  Physician's Global Assessment of arthritis (0-10): 1  Number of Tender Joints (0-28): 1  Number of Swollen Joints (0-28): 1        2/6/2024     9:43 AM   Rapid3 Question Responses and Scores   MDHAQ Score 0.9   Psychologic Score 0   Pain Score 7   When you awakened in the morning OVER THE LAST WEEK, did you feel stiff? Yes   If Yes, please indicate the number of hours until you are as limber as you will be for the day 1   Fatigue Score 5   Global Health Score 5   RAPID3 Score 5       Monitoring Lab Results:  Lab Results   Component Value Date    WBC 8.1 11/11/2024    RBC 3.81 11/11/2024    HGB 12.0 11/11/2024    HCT 36.2 11/11/2024    MCV 95.0 11/11/2024    MCH 31.5 11/11/2024    MCHC 33.1 11/11/2024    RDW 12.1 11/11/2024     11/11/2024        Lab Results   Component Value Date     11/11/2024    K 4.1 11/11/2024     11/11/2024    CO2 28 11/11/2024     (H) 11/11/2024    BUN 14 11/11/2024    CREATININE 0.77 11/11/2024    CALCIUM 9.1 11/11/2024    PROT 6.6 11/11/2024    ALBUMIN 4.3 11/11/2024    BILITOT 0.6 11/11/2024    ALKPHOS 89 12/08/2023    AST 16 11/11/2024    ALT 15 11/11/2024    ANIONGAP 11 12/08/2023    EGFRNORACEVR 89 11/11/2024       Lab Results   Component Value Date    SEDRATE 6 11/11/2024    CRP 11.9 (H) 11/11/2024        Lab Results   Component Value Date    FPWNPTFR10 372 11/02/2012        Lab Results   Component Value Date    CHOL 183 07/11/2015    HDL 48 07/11/2015    LDLCALC 114 07/11/2015    TRIG 105 07/11/2015       Lab Results   Component Value Date    RF <14 05/11/2023    CCPANTIBODIE <0.5 11/07/2014     Lab Results   Component Value Date    ANASCREEN Negative <1:160 11/07/2014    DSDNA 3 01/26/2022    SMRNPAB <1.0 NEG 11/11/2024    LSE88BT <1.0 NEG 01/26/2022     No results found for: "HLABB27"    Infectious Disease Screening:  Lab Results   Component Value Date    HEPBSAG NON-REACTIVE " "10/25/2022    HEPBCAB NON-REACTIVE 10/25/2022     Lab Results   Component Value Date    HEPCAB NON-REACTIVE 10/25/2022     Lab Results   Component Value Date    QUANTTBGDPL NEGATIVE 10/25/2022     No results found for: "QUANTIFERON", "SVCMT", "QUANTAGVALUE", "QUANTNILVALU", "QUANTMITOGEN", "QFTTBAG", "QINT"     Imaging:  DEXA, Xrays, MRIs, CTs, etc    Old & Outside Medical Records:  Reviewed old and all outside medical records available in Care Everywhere     Assessment:     Encounter Diagnoses   Name Primary?    PSA (psoriatic arthritis) Yes    Insomnia, unspecified type     Acute knee pain, unspecified laterality     Yeast infection     Herpes zoster with complication       Plan:      Encounter Diagnoses   Name Primary?    PSA (psoriatic arthritis) Yes    Insomnia, unspecified type     Acute knee pain, unspecified laterality     Yeast infection     Herpes zoster with complication      [place] was seen today for disease management.    Diagnoses and all orders for this visit:    PSA (psoriatic arthritis)  -     apremilast (OTEZLA STARTER) 10 mg (4)-20 mg (4)-30 mg (47) DsPk; As directed  -     apremilast (OTEZLA) 30 mg Tab; Take 1 tablet (30 mg total) by mouth 2 (two) times daily.  -     ketorolac injection 60 mg  -     cyanocobalamin injection 1,000 mcg  -     Complement, Total; Future  -     C4 Complement; Future  -     C3 Complement; Future  -     Sedimentation rate; Future  -     Sjogrens syndrome-B extractable nuclear antibody; Future  -     Sjogrens syndrome-A extractable nuclear antibody; Future  -     Comprehensive Metabolic Panel; Future  -     CBC Auto Differential; Future  -     Anti-Scleroderma Antibody; Future  -     Anti-Histone Antibody; Future  -     Anti-DNA Ab, Double-Stranded; Future  -     Anti Sm/RNP Antibody; Future  -     SALEEM Screen w/Reflex; Future  -     Urinalysis; Future  -     Protein/Creatinine Ratio, Urine; Future  -     Complement, Total  -     C4 Complement  -     C3 Complement  -     " Sedimentation rate  -     Sjogrens syndrome-B extractable nuclear antibody  -     Sjogrens syndrome-A extractable nuclear antibody  -     Comprehensive Metabolic Panel  -     CBC Auto Differential  -     Anti-Scleroderma Antibody  -     Anti-Histone Antibody  -     Anti-DNA Ab, Double-Stranded  -     Anti Sm/RNP Antibody  -     SALEEM Screen w/Reflex  -     Urinalysis  -     Protein/Creatinine Ratio, Urine    Insomnia, unspecified type  -     triazolam 0.25 MG tablet; Take 1 tablet (0.25 mg total) by mouth every evening. Administer dose once 30 minutes prior to cataract surgery  -     ketorolac injection 60 mg  -     cyanocobalamin injection 1,000 mcg  -     Complement, Total; Future  -     C4 Complement; Future  -     C3 Complement; Future  -     Sedimentation rate; Future  -     Sjogrens syndrome-B extractable nuclear antibody; Future  -     Sjogrens syndrome-A extractable nuclear antibody; Future  -     Comprehensive Metabolic Panel; Future  -     CBC Auto Differential; Future  -     Anti-Scleroderma Antibody; Future  -     Anti-Histone Antibody; Future  -     Anti-DNA Ab, Double-Stranded; Future  -     Anti Sm/RNP Antibody; Future  -     SALEEM Screen w/Reflex; Future  -     Urinalysis; Future  -     Protein/Creatinine Ratio, Urine; Future  -     Complement, Total  -     C4 Complement  -     C3 Complement  -     Sedimentation rate  -     Sjogrens syndrome-B extractable nuclear antibody  -     Sjogrens syndrome-A extractable nuclear antibody  -     Comprehensive Metabolic Panel  -     CBC Auto Differential  -     Anti-Scleroderma Antibody  -     Anti-Histone Antibody  -     Anti-DNA Ab, Double-Stranded  -     Anti Sm/RNP Antibody  -     SALEEM Screen w/Reflex  -     Urinalysis  -     Protein/Creatinine Ratio, Urine    Acute knee pain, unspecified laterality  -     oxyCODONE-acetaminophen (PERCOCET) 7.5-325 mg per tablet; Take 1 tablet by mouth every 6 (six) hours as needed for Pain.  -     ketorolac injection 60 mg  -      cyanocobalamin injection 1,000 mcg  -     Complement, Total; Future  -     C4 Complement; Future  -     C3 Complement; Future  -     Sedimentation rate; Future  -     Sjogrens syndrome-B extractable nuclear antibody; Future  -     Sjogrens syndrome-A extractable nuclear antibody; Future  -     Comprehensive Metabolic Panel; Future  -     CBC Auto Differential; Future  -     Anti-Scleroderma Antibody; Future  -     Anti-Histone Antibody; Future  -     Anti-DNA Ab, Double-Stranded; Future  -     Anti Sm/RNP Antibody; Future  -     SALEEM Screen w/Reflex; Future  -     Urinalysis; Future  -     Protein/Creatinine Ratio, Urine; Future  -     Complement, Total  -     C4 Complement  -     C3 Complement  -     Sedimentation rate  -     Sjogrens syndrome-B extractable nuclear antibody  -     Sjogrens syndrome-A extractable nuclear antibody  -     Comprehensive Metabolic Panel  -     CBC Auto Differential  -     Anti-Scleroderma Antibody  -     Anti-Histone Antibody  -     Anti-DNA Ab, Double-Stranded  -     Anti Sm/RNP Antibody  -     SALEEM Screen w/Reflex  -     Urinalysis  -     Protein/Creatinine Ratio, Urine    Yeast infection  -     fluconazole (DIFLUCAN) 150 MG Tab; Take 1 tablet (150 mg total) by mouth once daily.  -     Complement, Total; Future  -     C4 Complement; Future  -     C3 Complement; Future  -     Sedimentation rate; Future  -     Sjogrens syndrome-B extractable nuclear antibody; Future  -     Sjogrens syndrome-A extractable nuclear antibody; Future  -     Comprehensive Metabolic Panel; Future  -     CBC Auto Differential; Future  -     Anti-Scleroderma Antibody; Future  -     Anti-Histone Antibody; Future  -     Anti-DNA Ab, Double-Stranded; Future  -     Anti Sm/RNP Antibody; Future  -     SALEEM Screen w/Reflex; Future  -     Urinalysis; Future  -     Protein/Creatinine Ratio, Urine; Future  -     Complement, Total  -     C4 Complement  -     C3 Complement  -     Sedimentation rate  -     Sjogrens syndrome-B  extractable nuclear antibody  -     Sjogrens syndrome-A extractable nuclear antibody  -     Comprehensive Metabolic Panel  -     CBC Auto Differential  -     Anti-Scleroderma Antibody  -     Anti-Histone Antibody  -     Anti-DNA Ab, Double-Stranded  -     Anti Sm/RNP Antibody  -     SALEEM Screen w/Reflex  -     Urinalysis  -     Protein/Creatinine Ratio, Urine    Herpes zoster with complication  -     valACYclovir (VALTREX) 1000 MG tablet; Take 1 tablet (1,000 mg total) by mouth every 12 (twelve) hours.        1. Start otezla  2. Nurse injection  3. F/u 4 months     Follow-up 4 months    More than 50% of the  40 minute encounter was spent face to face counseling the patient regarding current status and future plan of care as well as side effects  of the medications. All questions were answered to patient's satisfaction also includes  non-face to face time preparing to see the patient (eg, review of tests), Obtaining and/or reviewing separately obtained history, Documenting clinical information in the electronic or other health record, Independently interpreting results            [1]   Social History  Tobacco Use    Smoking status: Never    Smokeless tobacco: Never   Substance Use Topics    Alcohol use: No    Drug use: No

## 2025-06-11 PROBLEM — L40.50 PSORIATIC ARTHRITIS: Status: ACTIVE | Noted: 2025-06-11

## 2025-06-12 ENCOUNTER — PATIENT MESSAGE (OUTPATIENT)
Dept: RHEUMATOLOGY | Facility: CLINIC | Age: 60
End: 2025-06-12
Payer: COMMERCIAL

## 2025-06-12 DIAGNOSIS — L70.0 ACNE VULGARIS: Primary | ICD-10-CM

## 2025-06-12 RX ORDER — VALACYCLOVIR HYDROCHLORIDE 1 G/1
1000 TABLET, FILM COATED ORAL EVERY 12 HOURS
Qty: 60 TABLET | Refills: 5 | Status: SHIPPED | OUTPATIENT
Start: 2025-06-12

## 2025-06-12 NOTE — TELEPHONE ENCOUNTER
Patient stated she is going out of town and requested medication refill.  Called into [facility] pharmacy 6/12/25.  Swati OROZCO

## 2025-06-13 RX ORDER — VALACYCLOVIR HYDROCHLORIDE 1 G/1
1000 TABLET, FILM COATED ORAL 2 TIMES DAILY
Qty: 60 TABLET | Refills: 5 | Status: SHIPPED | OUTPATIENT
Start: 2025-06-13 | End: 2026-06-13

## 2025-08-19 DIAGNOSIS — D83.9 CVID (COMMON VARIABLE IMMUNODEFICIENCY): ICD-10-CM

## 2025-08-19 DIAGNOSIS — K21.00 GASTROESOPHAGEAL REFLUX DISEASE WITH ESOPHAGITIS WITHOUT HEMORRHAGE: ICD-10-CM

## 2025-08-19 DIAGNOSIS — M35.00 SJOGREN'S SYNDROME, WITH UNSPECIFIED ORGAN INVOLVEMENT: ICD-10-CM

## 2025-08-19 RX ORDER — ERGOCALCIFEROL 1.25 MG/1
50000 CAPSULE ORAL
Qty: 4 CAPSULE | Refills: 0 | Status: SHIPPED | OUTPATIENT
Start: 2025-08-19

## 2025-08-21 ENCOUNTER — TELEPHONE (OUTPATIENT)
Dept: RHEUMATOLOGY | Facility: CLINIC | Age: 60
End: 2025-08-21
Payer: COMMERCIAL

## 2025-08-21 DIAGNOSIS — B02.8 HERPES ZOSTER WITH COMPLICATION: Primary | ICD-10-CM

## 2025-08-21 RX ORDER — VALACYCLOVIR HYDROCHLORIDE 1 G/1
1000 TABLET, FILM COATED ORAL 3 TIMES DAILY
Qty: 42 TABLET | Refills: 0 | Status: SHIPPED | OUTPATIENT
Start: 2025-08-21 | End: 2025-09-04